# Patient Record
Sex: MALE | URBAN - METROPOLITAN AREA
[De-identification: names, ages, dates, MRNs, and addresses within clinical notes are randomized per-mention and may not be internally consistent; named-entity substitution may affect disease eponyms.]

---

## 2018-07-10 PROBLEM — T87.89 NON-HEALING WOUND OF AMPUTATION STUMP (HCC): Status: ACTIVE | Noted: 2018-07-10

## 2019-02-04 PROBLEM — N18.6 ESRD (END STAGE RENAL DISEASE) (HCC): Status: ACTIVE | Noted: 2019-02-04

## 2019-02-05 PROBLEM — Z48.812 ENCOUNTER FOR SURGICAL AFTERCARE FOLLOWING SURGERY OF CIRCULATORY SYSTEM: Status: ACTIVE | Noted: 2019-02-05

## 2019-10-02 PROBLEM — K22.10 ULCER OF ESOPHAGUS: Status: ACTIVE | Noted: 2019-10-02

## 2019-10-02 PROBLEM — R11.2 NAUSEA AND VOMITING: Status: ACTIVE | Noted: 2019-10-02

## 2019-10-02 PROBLEM — Z95.828 PRESENCE OF OTHER VASCULAR IMPLANTS AND GRAFTS: Status: ACTIVE | Noted: 2019-10-02

## 2019-10-02 PROBLEM — T85.858A STENOSIS DUE TO ANY DEVICE, IMPLANT OR GRAFT: Status: ACTIVE | Noted: 2019-10-02

## 2019-10-02 PROBLEM — I70.221 ATHEROSCLEROSIS OF RIGHT LOWER EXTREMITY WITH REST PAIN (HCC): Status: ACTIVE | Noted: 2018-07-10

## 2019-10-02 PROBLEM — I65.23 OCCLUSION AND STENOSIS OF BILATERAL CAROTID ARTERIES: Status: ACTIVE | Noted: 2019-10-02

## 2019-10-02 PROBLEM — I77.811 ABDOMINAL AORTIC ECTASIA (HCC): Status: ACTIVE | Noted: 2019-10-02

## 2019-10-02 PROBLEM — S88.119A AMPUTATED BELOW KNEE (HCC): Status: ACTIVE | Noted: 2019-10-02

## 2020-03-17 PROBLEM — Z99.2 ESRD ON PERITONEAL DIALYSIS (HCC): Status: ACTIVE | Noted: 2020-03-17

## 2020-03-17 PROBLEM — G54.6 PHANTOM PAIN FOLLOWING AMPUTATION OF LOWER LIMB (HCC): Status: ACTIVE | Noted: 2020-03-17

## 2020-03-17 PROBLEM — A04.72 C. DIFFICILE DIARRHEA: Status: ACTIVE | Noted: 2020-03-17

## 2020-03-17 PROBLEM — N18.6 ESRD ON PERITONEAL DIALYSIS (HCC): Status: ACTIVE | Noted: 2020-03-17

## 2020-04-07 PROBLEM — I70.222 ATHEROSCLEROSIS OF NATIVE ARTERIES OF EXTREMITIES WITH REST PAIN, LEFT LEG (HCC): Status: ACTIVE | Noted: 2018-07-10

## 2020-09-09 ENCOUNTER — APPOINTMENT (RX ONLY)
Dept: URBAN - METROPOLITAN AREA CLINIC 31 | Facility: CLINIC | Age: 65
Setting detail: DERMATOLOGY
End: 2020-09-09

## 2020-09-09 DIAGNOSIS — L82.1 OTHER SEBORRHEIC KERATOSIS: ICD-10-CM

## 2020-09-09 DIAGNOSIS — L72.8 OTHER FOLLICULAR CYSTS OF THE SKIN AND SUBCUTANEOUS TISSUE: ICD-10-CM

## 2020-09-09 PROCEDURE — ? ADDITIONAL NOTES

## 2020-09-09 PROCEDURE — 99201: CPT

## 2020-09-09 PROCEDURE — ? COUNSELING

## 2020-09-09 ASSESSMENT — LOCATION SIMPLE DESCRIPTION DERM
LOCATION SIMPLE: POSTERIOR NECK
LOCATION SIMPLE: LEFT UPPER BACK

## 2020-09-09 ASSESSMENT — LOCATION ZONE DERM
LOCATION ZONE: TRUNK
LOCATION ZONE: NECK

## 2020-09-09 ASSESSMENT — LOCATION DETAILED DESCRIPTION DERM
LOCATION DETAILED: LEFT INFERIOR MEDIAL UPPER BACK
LOCATION DETAILED: MID POSTERIOR NECK

## 2020-09-09 NOTE — PROCEDURE: ADDITIONAL NOTES
Additional Notes: If starts growing and becomes bothersome, call to schedule excision.
Detail Level: Detailed

## 2020-10-14 PROBLEM — Z99.2 DEPENDENCE ON RENAL DIALYSIS (HCC): Status: ACTIVE | Noted: 2020-10-13

## 2020-10-14 PROBLEM — I21.9 HEART ATTACK (HCC): Status: ACTIVE | Noted: 2020-10-13

## 2020-10-14 PROBLEM — D63.1 ANEMIA IN CHRONIC KIDNEY DISEASE (CKD): Status: ACTIVE | Noted: 2020-10-13

## 2020-10-14 PROBLEM — N18.9 ANEMIA IN CHRONIC KIDNEY DISEASE (CKD): Status: ACTIVE | Noted: 2020-10-13

## 2020-10-14 PROBLEM — I10 ESSENTIAL HYPERTENSION: Status: ACTIVE | Noted: 2020-10-13

## 2020-10-14 PROBLEM — E78.2 MIXED HYPERLIPIDEMIA: Status: ACTIVE | Noted: 2020-10-13

## 2020-11-12 PROBLEM — K21.9 GERD (GASTROESOPHAGEAL REFLUX DISEASE): Status: RESOLVED | Noted: 2020-11-12 | Resolved: 2020-11-12

## 2020-11-12 PROBLEM — K21.9 GERD (GASTROESOPHAGEAL REFLUX DISEASE): Status: ACTIVE | Noted: 2020-11-12

## 2020-12-11 PROBLEM — Z99.2 CKD (CHRONIC KIDNEY DISEASE) STAGE V REQUIRING CHRONIC DIALYSIS (HCC): Status: ACTIVE | Noted: 2019-02-04

## 2021-03-01 PROBLEM — I25.10 CAD (CORONARY ARTERY DISEASE): Status: ACTIVE | Noted: 2021-02-23

## 2021-05-04 PROBLEM — E03.9 HYPOTHYROID: Status: ACTIVE | Noted: 2021-04-29

## 2021-05-04 PROBLEM — I25.10 ATHEROSCLEROSIS OF NATIVE CORONARY ARTERY WITHOUT ANGINA PECTORIS: Status: ACTIVE | Noted: 2020-10-13

## 2021-06-03 PROBLEM — I34.0 NON-RHEUMATIC MITRAL REGURGITATION: Status: ACTIVE | Noted: 2021-05-24

## 2021-06-04 ENCOUNTER — APPOINTMENT (OUTPATIENT)
Dept: RADIOLOGY | Facility: MEDICAL CENTER | Age: 66
DRG: 291 | End: 2021-06-04
Attending: STUDENT IN AN ORGANIZED HEALTH CARE EDUCATION/TRAINING PROGRAM
Payer: MEDICARE

## 2021-06-04 ENCOUNTER — HOSPITAL ENCOUNTER (INPATIENT)
Facility: MEDICAL CENTER | Age: 66
LOS: 13 days | DRG: 291 | End: 2021-06-17
Attending: HOSPITALIST | Admitting: STUDENT IN AN ORGANIZED HEALTH CARE EDUCATION/TRAINING PROGRAM
Payer: MEDICARE

## 2021-06-04 DIAGNOSIS — I73.9 PVD (PERIPHERAL VASCULAR DISEASE) (HCC): ICD-10-CM

## 2021-06-04 DIAGNOSIS — I50.22 SYSTOLIC CHF, CHRONIC (HCC): ICD-10-CM

## 2021-06-04 DIAGNOSIS — R62.7 FAILURE TO THRIVE IN ADULT: ICD-10-CM

## 2021-06-04 DIAGNOSIS — Z71.89 ACP (ADVANCE CARE PLANNING): ICD-10-CM

## 2021-06-04 DIAGNOSIS — R54 FRAILTY SYNDROME IN GERIATRIC PATIENT: ICD-10-CM

## 2021-06-04 DIAGNOSIS — E03.9 HYPOTHYROIDISM, UNSPECIFIED TYPE: ICD-10-CM

## 2021-06-04 DIAGNOSIS — E43 SEVERE PROTEIN-CALORIE MALNUTRITION (HCC): ICD-10-CM

## 2021-06-04 DIAGNOSIS — Z99.2 ESRD ON DIALYSIS (HCC): ICD-10-CM

## 2021-06-04 DIAGNOSIS — N18.6 ESRD ON DIALYSIS (HCC): ICD-10-CM

## 2021-06-04 DIAGNOSIS — F41.9 ANXIETY: ICD-10-CM

## 2021-06-04 PROBLEM — K55.1 SUPERIOR MESENTERIC ARTERY STENOSIS (HCC): Status: ACTIVE | Noted: 2021-06-04

## 2021-06-04 LAB
CORTIS SERPL-MCNC: 15.6 UG/DL (ref 0–23)
CRP SERPL HS-MCNC: 1 MG/DL (ref 0–0.75)
ERYTHROCYTE [SEDIMENTATION RATE] IN BLOOD BY WESTERGREN METHOD: 6 MM/HOUR (ref 0–20)
LACTATE BLD-SCNC: 1.5 MMOL/L (ref 0.5–2)
LDH SERPL L TO P-CCNC: 246 U/L (ref 107–266)
PROCALCITONIN SERPL-MCNC: 0.27 NG/ML

## 2021-06-04 PROCEDURE — 99223 1ST HOSP IP/OBS HIGH 75: CPT | Mod: 25,AI | Performed by: STUDENT IN AN ORGANIZED HEALTH CARE EDUCATION/TRAINING PROGRAM

## 2021-06-04 PROCEDURE — 74177 CT ABD & PELVIS W/CONTRAST: CPT

## 2021-06-04 PROCEDURE — 84145 PROCALCITONIN (PCT): CPT

## 2021-06-04 PROCEDURE — 86140 C-REACTIVE PROTEIN: CPT

## 2021-06-04 PROCEDURE — 700102 HCHG RX REV CODE 250 W/ 637 OVERRIDE(OP): Performed by: STUDENT IN AN ORGANIZED HEALTH CARE EDUCATION/TRAINING PROGRAM

## 2021-06-04 PROCEDURE — 700117 HCHG RX CONTRAST REV CODE 255: Performed by: STUDENT IN AN ORGANIZED HEALTH CARE EDUCATION/TRAINING PROGRAM

## 2021-06-04 PROCEDURE — A9270 NON-COVERED ITEM OR SERVICE: HCPCS | Performed by: INTERNAL MEDICINE

## 2021-06-04 PROCEDURE — 85652 RBC SED RATE AUTOMATED: CPT

## 2021-06-04 PROCEDURE — 83605 ASSAY OF LACTIC ACID: CPT | Mod: 91

## 2021-06-04 PROCEDURE — 770006 HCHG ROOM/CARE - MED/SURG/GYN SEMI*

## 2021-06-04 PROCEDURE — A9270 NON-COVERED ITEM OR SERVICE: HCPCS | Performed by: STUDENT IN AN ORGANIZED HEALTH CARE EDUCATION/TRAINING PROGRAM

## 2021-06-04 PROCEDURE — 36415 COLL VENOUS BLD VENIPUNCTURE: CPT

## 2021-06-04 PROCEDURE — 82533 TOTAL CORTISOL: CPT

## 2021-06-04 PROCEDURE — 83615 LACTATE (LD) (LDH) ENZYME: CPT

## 2021-06-04 PROCEDURE — 99497 ADVNCD CARE PLAN 30 MIN: CPT | Performed by: STUDENT IN AN ORGANIZED HEALTH CARE EDUCATION/TRAINING PROGRAM

## 2021-06-04 PROCEDURE — 700111 HCHG RX REV CODE 636 W/ 250 OVERRIDE (IP): Performed by: STUDENT IN AN ORGANIZED HEALTH CARE EDUCATION/TRAINING PROGRAM

## 2021-06-04 PROCEDURE — 700102 HCHG RX REV CODE 250 W/ 637 OVERRIDE(OP): Performed by: INTERNAL MEDICINE

## 2021-06-04 RX ORDER — CHOLECALCIFEROL (VITAMIN D3) 125 MCG
500 CAPSULE ORAL DAILY
Status: DISCONTINUED | OUTPATIENT
Start: 2021-06-05 | End: 2021-06-17 | Stop reason: HOSPADM

## 2021-06-04 RX ORDER — ATORVASTATIN CALCIUM 40 MG/1
40 TABLET, FILM COATED ORAL NIGHTLY
Status: DISCONTINUED | OUTPATIENT
Start: 2021-06-04 | End: 2021-06-12

## 2021-06-04 RX ORDER — CARVEDILOL 6.25 MG/1
6.25 TABLET ORAL 2 TIMES DAILY WITH MEALS
Status: DISCONTINUED | OUTPATIENT
Start: 2021-06-04 | End: 2021-06-17 | Stop reason: HOSPADM

## 2021-06-04 RX ORDER — RANOLAZINE 500 MG/1
500 TABLET, EXTENDED RELEASE ORAL 2 TIMES DAILY
Status: DISCONTINUED | OUTPATIENT
Start: 2021-06-04 | End: 2021-06-17 | Stop reason: HOSPADM

## 2021-06-04 RX ORDER — PRASUGREL 10 MG/1
10 TABLET, FILM COATED ORAL DAILY
Status: DISCONTINUED | OUTPATIENT
Start: 2021-06-05 | End: 2021-06-17 | Stop reason: HOSPADM

## 2021-06-04 RX ORDER — GUAIFENESIN/DEXTROMETHORPHAN 100-10MG/5
10 SYRUP ORAL EVERY 6 HOURS PRN
Status: DISCONTINUED | OUTPATIENT
Start: 2021-06-04 | End: 2021-06-17 | Stop reason: HOSPADM

## 2021-06-04 RX ORDER — HALOPERIDOL 5 MG/ML
1 INJECTION INTRAMUSCULAR EVERY 4 HOURS PRN
Status: DISCONTINUED | OUTPATIENT
Start: 2021-06-04 | End: 2021-06-11

## 2021-06-04 RX ORDER — ISOSORBIDE DINITRATE 30 MG/1
30 TABLET ORAL 2 TIMES DAILY
Status: DISCONTINUED | OUTPATIENT
Start: 2021-06-04 | End: 2021-06-17 | Stop reason: HOSPADM

## 2021-06-04 RX ORDER — ACETAMINOPHEN 325 MG/1
650 TABLET ORAL EVERY 6 HOURS PRN
Status: DISCONTINUED | OUTPATIENT
Start: 2021-06-04 | End: 2021-06-12

## 2021-06-04 RX ORDER — POLYETHYLENE GLYCOL 3350 17 G/17G
1 POWDER, FOR SOLUTION ORAL
Status: DISCONTINUED | OUTPATIENT
Start: 2021-06-04 | End: 2021-06-04

## 2021-06-04 RX ORDER — HEPARIN SODIUM 5000 [USP'U]/ML
5000 INJECTION, SOLUTION INTRAVENOUS; SUBCUTANEOUS EVERY 8 HOURS
Status: DISCONTINUED | OUTPATIENT
Start: 2021-06-04 | End: 2021-06-04

## 2021-06-04 RX ORDER — VITAMIN B COMPLEX
2000 TABLET ORAL DAILY
Status: DISCONTINUED | OUTPATIENT
Start: 2021-06-05 | End: 2021-06-17 | Stop reason: HOSPADM

## 2021-06-04 RX ORDER — AMOXICILLIN 250 MG
2 CAPSULE ORAL 2 TIMES DAILY
Status: DISCONTINUED | OUTPATIENT
Start: 2021-06-04 | End: 2021-06-04

## 2021-06-04 RX ORDER — WARFARIN SODIUM 2.5 MG/1
2.5 TABLET ORAL DAILY
Status: DISCONTINUED | OUTPATIENT
Start: 2021-06-04 | End: 2021-06-07

## 2021-06-04 RX ORDER — LEVOTHYROXINE SODIUM 0.07 MG/1
150 TABLET ORAL EVERY MORNING
Status: DISCONTINUED | OUTPATIENT
Start: 2021-06-05 | End: 2021-06-17 | Stop reason: HOSPADM

## 2021-06-04 RX ORDER — LABETALOL HYDROCHLORIDE 5 MG/ML
10 INJECTION, SOLUTION INTRAVENOUS EVERY 4 HOURS PRN
Status: DISCONTINUED | OUTPATIENT
Start: 2021-06-04 | End: 2021-06-17 | Stop reason: HOSPADM

## 2021-06-04 RX ORDER — PROCHLORPERAZINE EDISYLATE 5 MG/ML
10 INJECTION INTRAMUSCULAR; INTRAVENOUS EVERY 6 HOURS PRN
Status: DISCONTINUED | OUTPATIENT
Start: 2021-06-04 | End: 2021-06-06

## 2021-06-04 RX ORDER — ASCORBIC ACID 500 MG
500 TABLET ORAL DAILY
Status: DISCONTINUED | OUTPATIENT
Start: 2021-06-05 | End: 2021-06-17 | Stop reason: HOSPADM

## 2021-06-04 RX ORDER — ONDANSETRON 2 MG/ML
4 INJECTION INTRAMUSCULAR; INTRAVENOUS EVERY 4 HOURS PRN
Status: DISCONTINUED | OUTPATIENT
Start: 2021-06-04 | End: 2021-06-11

## 2021-06-04 RX ORDER — ALPRAZOLAM 0.25 MG/1
0.25 TABLET ORAL NIGHTLY PRN
Status: DISCONTINUED | OUTPATIENT
Start: 2021-06-04 | End: 2021-06-17 | Stop reason: HOSPADM

## 2021-06-04 RX ORDER — ALPRAZOLAM 0.5 MG/1
0.5 TABLET ORAL
Status: DISCONTINUED | OUTPATIENT
Start: 2021-06-04 | End: 2021-06-04

## 2021-06-04 RX ORDER — CALCIUM ACETATE 667 MG/1
1334 TABLET ORAL
Status: DISCONTINUED | OUTPATIENT
Start: 2021-06-04 | End: 2021-06-08

## 2021-06-04 RX ORDER — PROMETHAZINE HYDROCHLORIDE 25 MG/1
12.5 SUPPOSITORY RECTAL EVERY 6 HOURS PRN
Status: DISCONTINUED | OUTPATIENT
Start: 2021-06-04 | End: 2021-06-06

## 2021-06-04 RX ORDER — ENALAPRILAT 1.25 MG/ML
1.25 INJECTION INTRAVENOUS EVERY 6 HOURS PRN
Status: DISCONTINUED | OUTPATIENT
Start: 2021-06-04 | End: 2021-06-17 | Stop reason: HOSPADM

## 2021-06-04 RX ORDER — OXYCODONE HYDROCHLORIDE 5 MG/1
5 TABLET ORAL NIGHTLY PRN
Status: DISCONTINUED | OUTPATIENT
Start: 2021-06-04 | End: 2021-06-12

## 2021-06-04 RX ORDER — ONDANSETRON 4 MG/1
4 TABLET, ORALLY DISINTEGRATING ORAL EVERY 4 HOURS PRN
Status: DISCONTINUED | OUTPATIENT
Start: 2021-06-04 | End: 2021-06-11

## 2021-06-04 RX ORDER — PROMETHAZINE HYDROCHLORIDE 25 MG/1
25 TABLET ORAL EVERY 6 HOURS PRN
Status: DISCONTINUED | OUTPATIENT
Start: 2021-06-04 | End: 2021-06-06

## 2021-06-04 RX ORDER — ALBUTEROL SULFATE 90 UG/1
1-2 AEROSOL, METERED RESPIRATORY (INHALATION) EVERY 6 HOURS PRN
Status: DISCONTINUED | OUTPATIENT
Start: 2021-06-04 | End: 2021-06-17 | Stop reason: HOSPADM

## 2021-06-04 RX ORDER — SODIUM CHLORIDE, SODIUM LACTATE, POTASSIUM CHLORIDE, AND CALCIUM CHLORIDE .6; .31; .03; .02 G/100ML; G/100ML; G/100ML; G/100ML
500 INJECTION, SOLUTION INTRAVENOUS
Status: DISCONTINUED | OUTPATIENT
Start: 2021-06-04 | End: 2021-06-17 | Stop reason: HOSPADM

## 2021-06-04 RX ORDER — BISACODYL 10 MG
10 SUPPOSITORY, RECTAL RECTAL
Status: DISCONTINUED | OUTPATIENT
Start: 2021-06-04 | End: 2021-06-04

## 2021-06-04 RX ORDER — LORATADINE 10 MG/1
10 TABLET ORAL DAILY
Status: DISCONTINUED | OUTPATIENT
Start: 2021-06-05 | End: 2021-06-11

## 2021-06-04 RX ORDER — FLUTICASONE PROPIONATE 50 MCG
2 SPRAY, SUSPENSION (ML) NASAL DAILY
Status: DISCONTINUED | OUTPATIENT
Start: 2021-06-05 | End: 2021-06-17 | Stop reason: HOSPADM

## 2021-06-04 RX ADMIN — IOHEXOL 80 ML: 350 INJECTION, SOLUTION INTRAVENOUS at 21:07

## 2021-06-04 RX ADMIN — ATORVASTATIN CALCIUM 40 MG: 40 TABLET, FILM COATED ORAL at 21:30

## 2021-06-04 RX ADMIN — CARVEDILOL 6.25 MG: 6.25 TABLET, FILM COATED ORAL at 21:31

## 2021-06-04 RX ADMIN — RANOLAZINE 500 MG: 500 TABLET, FILM COATED, EXTENDED RELEASE ORAL at 21:30

## 2021-06-04 RX ADMIN — ALPRAZOLAM 0.25 MG: 0.25 TABLET ORAL at 23:21

## 2021-06-04 RX ADMIN — OXYCODONE 5 MG: 5 TABLET ORAL at 23:21

## 2021-06-04 RX ADMIN — ONDANSETRON 4 MG: 2 INJECTION INTRAMUSCULAR; INTRAVENOUS at 21:46

## 2021-06-04 RX ADMIN — WARFARIN SODIUM 2.5 MG: 2.5 TABLET ORAL at 23:21

## 2021-06-04 RX ADMIN — ISOSORBIDE DINITRATE 30 MG: 30 TABLET ORAL at 21:30

## 2021-06-04 ASSESSMENT — COGNITIVE AND FUNCTIONAL STATUS - GENERAL
MOVING FROM LYING ON BACK TO SITTING ON SIDE OF FLAT BED: A LITTLE
EATING MEALS: A LITTLE
DAILY ACTIVITIY SCORE: 18
SUGGESTED CMS G CODE MODIFIER MOBILITY: CK
STANDING UP FROM CHAIR USING ARMS: A LITTLE
DRESSING REGULAR LOWER BODY CLOTHING: A LITTLE
PERSONAL GROOMING: A LITTLE
MOVING TO AND FROM BED TO CHAIR: A LITTLE
MOBILITY SCORE: 18
SUGGESTED CMS G CODE MODIFIER DAILY ACTIVITY: CK
TOILETING: A LITTLE
HELP NEEDED FOR BATHING: A LITTLE
WALKING IN HOSPITAL ROOM: A LITTLE
DRESSING REGULAR UPPER BODY CLOTHING: A LITTLE
CLIMB 3 TO 5 STEPS WITH RAILING: A LITTLE
TURNING FROM BACK TO SIDE WHILE IN FLAT BAD: A LITTLE

## 2021-06-04 ASSESSMENT — ENCOUNTER SYMPTOMS
COUGH: 0
DOUBLE VISION: 0
HEADACHES: 0
PALPITATIONS: 0
FLANK PAIN: 0
FALLS: 0
VOMITING: 1
HEARTBURN: 0
MYALGIAS: 0
NAUSEA: 1
SPEECH CHANGE: 0
FOCAL WEAKNESS: 0
DIZZINESS: 0
DEPRESSION: 0
BLURRED VISION: 0
BLOOD IN STOOL: 0
FEVER: 0
DIARRHEA: 0
PHOTOPHOBIA: 0
CHILLS: 0
CONSTIPATION: 0
BRUISES/BLEEDS EASILY: 0
WHEEZING: 0

## 2021-06-04 ASSESSMENT — FIBROSIS 4 INDEX: FIB4 SCORE: 2.17

## 2021-06-04 ASSESSMENT — PAIN DESCRIPTION - PAIN TYPE
TYPE: ACUTE PAIN
TYPE: ACUTE PAIN

## 2021-06-04 NOTE — PROGRESS NOTES
[facility] TRIAGE OFFICER ER REPORT  Consult/Admission requested by: Dr Orellana  Chief complaint: Nausea, vomiting, 20 lb weight loss in a month.  Pertinent history/ER Course: Please refer to the record in [facility] from [place]     Patient meets admission criterion: Yes..  Recommendations given or work up & consultations requested per triage officer:   Records from [facility] including last CTA abd/pelvis revealing SMA stenosis.   Consultants involved and pertinent input from consultants: see Epic and see records to be sent from last stay at [facility].    [facility] is on divert. [facility] is on divert.     Admission status: Inpatient.   Admission order placed: Yes.   Floor requested: medical   Assigned hospitalist: call [phone]

## 2021-06-05 ENCOUNTER — APPOINTMENT (OUTPATIENT)
Dept: CARDIOLOGY | Facility: MEDICAL CENTER | Age: 66
DRG: 291 | End: 2021-06-05
Attending: STUDENT IN AN ORGANIZED HEALTH CARE EDUCATION/TRAINING PROGRAM
Payer: MEDICARE

## 2021-06-05 ENCOUNTER — APPOINTMENT (OUTPATIENT)
Dept: RADIOLOGY | Facility: MEDICAL CENTER | Age: 66
DRG: 291 | End: 2021-06-05
Attending: STUDENT IN AN ORGANIZED HEALTH CARE EDUCATION/TRAINING PROGRAM
Payer: MEDICARE

## 2021-06-05 ENCOUNTER — HOSPITAL ENCOUNTER (OUTPATIENT)
Dept: RADIOLOGY | Facility: MEDICAL CENTER | Age: 66
End: 2021-06-05
Payer: MEDICARE

## 2021-06-05 LAB
ALBUMIN SERPL BCP-MCNC: 2.8 G/DL (ref 3.2–4.9)
ANISOCYTOSIS BLD QL SMEAR: ABNORMAL
BASOPHILS # BLD AUTO: 2.6 % (ref 0–1.8)
BASOPHILS # BLD: 0.2 K/UL (ref 0–0.12)
BUN SERPL-MCNC: 72 MG/DL (ref 8–22)
CALCIUM SERPL-MCNC: 9.2 MG/DL (ref 8.5–10.5)
CHLORIDE SERPL-SCNC: 89 MMOL/L (ref 96–112)
CO2 SERPL-SCNC: 23 MMOL/L (ref 20–33)
CREAT SERPL-MCNC: 15.17 MG/DL (ref 0.5–1.4)
EOSINOPHIL # BLD AUTO: 0.13 K/UL (ref 0–0.51)
EOSINOPHIL NFR BLD: 1.7 % (ref 0–6.9)
ERYTHROCYTE [DISTWIDTH] IN BLOOD BY AUTOMATED COUNT: 66.5 FL (ref 35.9–50)
GLUCOSE SERPL-MCNC: 65 MG/DL (ref 65–99)
HAV IGM SERPL QL IA: NORMAL
HBV CORE IGM SER QL: NORMAL
HBV SURFACE AB SERPL IA-ACNC: <3.5 MIU/ML (ref 0–10)
HBV SURFACE AG SER QL: NORMAL
HCT VFR BLD AUTO: 37.5 % (ref 42–52)
HCV AB SER QL: NORMAL
HGB BLD-MCNC: 12.2 G/DL (ref 14–18)
INR PPP: 2.31 (ref 0.87–1.13)
LYMPHOCYTES # BLD AUTO: 1.32 K/UL (ref 1–4.8)
LYMPHOCYTES NFR BLD: 17.4 % (ref 22–41)
MACROCYTES BLD QL SMEAR: ABNORMAL
MAGNESIUM SERPL-MCNC: 2.8 MG/DL (ref 1.5–2.5)
MANUAL DIFF BLD: NORMAL
MCH RBC QN AUTO: 33.5 PG (ref 27–33)
MCHC RBC AUTO-ENTMCNC: 32.5 G/DL (ref 33.7–35.3)
MCV RBC AUTO: 103 FL (ref 81.4–97.8)
MICROCYTES BLD QL SMEAR: ABNORMAL
MONOCYTES # BLD AUTO: 0.33 K/UL (ref 0–0.85)
MONOCYTES NFR BLD AUTO: 4.4 % (ref 0–13.4)
MORPHOLOGY BLD-IMP: NORMAL
NEUTROPHILS # BLD AUTO: 5.62 K/UL (ref 1.82–7.42)
NEUTROPHILS NFR BLD: 73.9 % (ref 44–72)
NRBC # BLD AUTO: 0 K/UL
NRBC BLD-RTO: 0 /100 WBC
PHOSPHATE SERPL-MCNC: 7.7 MG/DL (ref 2.5–4.5)
PLATELET # BLD AUTO: 155 K/UL (ref 164–446)
PLATELET BLD QL SMEAR: NORMAL
PMV BLD AUTO: 9.8 FL (ref 9–12.9)
POTASSIUM SERPL-SCNC: 3.7 MMOL/L (ref 3.6–5.5)
PROTHROMBIN TIME: 26.1 SEC (ref 12–14.6)
RBC # BLD AUTO: 3.64 M/UL (ref 4.7–6.1)
RBC BLD AUTO: PRESENT
SODIUM SERPL-SCNC: 131 MMOL/L (ref 135–145)
WBC # BLD AUTO: 7.6 K/UL (ref 4.8–10.8)

## 2021-06-05 PROCEDURE — 770001 HCHG ROOM/CARE - MED/SURG/GYN PRIV*

## 2021-06-05 PROCEDURE — A9270 NON-COVERED ITEM OR SERVICE: HCPCS | Performed by: INTERNAL MEDICINE

## 2021-06-05 PROCEDURE — 85027 COMPLETE CBC AUTOMATED: CPT

## 2021-06-05 PROCEDURE — 86706 HEP B SURFACE ANTIBODY: CPT

## 2021-06-05 PROCEDURE — 85007 BL SMEAR W/DIFF WBC COUNT: CPT

## 2021-06-05 PROCEDURE — 90945 DIALYSIS ONE EVALUATION: CPT

## 2021-06-05 PROCEDURE — 700102 HCHG RX REV CODE 250 W/ 637 OVERRIDE(OP): Performed by: STUDENT IN AN ORGANIZED HEALTH CARE EDUCATION/TRAINING PROGRAM

## 2021-06-05 PROCEDURE — 85610 PROTHROMBIN TIME: CPT

## 2021-06-05 PROCEDURE — A9270 NON-COVERED ITEM OR SERVICE: HCPCS | Performed by: STUDENT IN AN ORGANIZED HEALTH CARE EDUCATION/TRAINING PROGRAM

## 2021-06-05 PROCEDURE — 99233 SBSQ HOSP IP/OBS HIGH 50: CPT | Performed by: STUDENT IN AN ORGANIZED HEALTH CARE EDUCATION/TRAINING PROGRAM

## 2021-06-05 PROCEDURE — 83735 ASSAY OF MAGNESIUM: CPT

## 2021-06-05 PROCEDURE — 700102 HCHG RX REV CODE 250 W/ 637 OVERRIDE(OP): Performed by: INTERNAL MEDICINE

## 2021-06-05 PROCEDURE — 700111 HCHG RX REV CODE 636 W/ 250 OVERRIDE (IP): Performed by: STUDENT IN AN ORGANIZED HEALTH CARE EDUCATION/TRAINING PROGRAM

## 2021-06-05 PROCEDURE — 71250 CT THORAX DX C-: CPT | Mod: ME

## 2021-06-05 PROCEDURE — 80074 ACUTE HEPATITIS PANEL: CPT

## 2021-06-05 PROCEDURE — 36415 COLL VENOUS BLD VENIPUNCTURE: CPT

## 2021-06-05 PROCEDURE — 80069 RENAL FUNCTION PANEL: CPT

## 2021-06-05 RX ORDER — POLYETHYLENE GLYCOL 3350 17 G/17G
1 POWDER, FOR SOLUTION ORAL DAILY
Status: DISCONTINUED | OUTPATIENT
Start: 2021-06-05 | End: 2021-06-17 | Stop reason: HOSPADM

## 2021-06-05 RX ORDER — OMEPRAZOLE 20 MG/1
20 CAPSULE, DELAYED RELEASE ORAL DAILY
Status: DISCONTINUED | OUTPATIENT
Start: 2021-06-05 | End: 2021-06-08

## 2021-06-05 RX ORDER — AMOXICILLIN 250 MG
1 CAPSULE ORAL 2 TIMES DAILY
Status: DISCONTINUED | OUTPATIENT
Start: 2021-06-05 | End: 2021-06-17 | Stop reason: HOSPADM

## 2021-06-05 RX ORDER — GENTAMICIN SULFATE 1 MG/G
OINTMENT TOPICAL
Status: DISPENSED | OUTPATIENT
Start: 2021-06-05 | End: 2021-06-12

## 2021-06-05 RX ADMIN — RANOLAZINE 500 MG: 500 TABLET, FILM COATED, EXTENDED RELEASE ORAL at 21:48

## 2021-06-05 RX ADMIN — CARVEDILOL 6.25 MG: 6.25 TABLET, FILM COATED ORAL at 21:46

## 2021-06-05 RX ADMIN — WARFARIN SODIUM 2.5 MG: 2.5 TABLET ORAL at 21:45

## 2021-06-05 RX ADMIN — Medication 1334 MG: at 12:00

## 2021-06-05 RX ADMIN — ISOSORBIDE DINITRATE 30 MG: 30 TABLET ORAL at 21:47

## 2021-06-05 RX ADMIN — LEVOTHYROXINE SODIUM 150 MCG: 0.15 TABLET ORAL at 05:36

## 2021-06-05 RX ADMIN — DOCUSATE SODIUM 50 MG AND SENNOSIDES 8.6 MG 1 TABLET: 8.6; 5 TABLET, FILM COATED ORAL at 18:53

## 2021-06-05 RX ADMIN — ALPRAZOLAM 0.25 MG: 0.25 TABLET ORAL at 21:44

## 2021-06-05 RX ADMIN — OXYCODONE HYDROCHLORIDE AND ACETAMINOPHEN 500 MG: 500 TABLET ORAL at 05:36

## 2021-06-05 RX ADMIN — RANOLAZINE 500 MG: 500 TABLET, FILM COATED, EXTENDED RELEASE ORAL at 10:12

## 2021-06-05 RX ADMIN — POLYETHYLENE GLYCOL 3350 1 PACKET: 17 POWDER, FOR SOLUTION ORAL at 18:53

## 2021-06-05 RX ADMIN — CARVEDILOL 6.25 MG: 6.25 TABLET, FILM COATED ORAL at 10:12

## 2021-06-05 RX ADMIN — ATORVASTATIN CALCIUM 40 MG: 40 TABLET, FILM COATED ORAL at 21:47

## 2021-06-05 RX ADMIN — OMEPRAZOLE 20 MG: 20 CAPSULE, DELAYED RELEASE ORAL at 19:59

## 2021-06-05 RX ADMIN — CYANOCOBALAMIN TAB 500 MCG 500 MCG: 500 TAB at 05:36

## 2021-06-05 RX ADMIN — ONDANSETRON 4 MG: 2 INJECTION INTRAMUSCULAR; INTRAVENOUS at 12:03

## 2021-06-05 RX ADMIN — PRASUGREL 10 MG: 10 TABLET, FILM COATED ORAL at 05:36

## 2021-06-05 RX ADMIN — Medication 1334 MG: at 10:12

## 2021-06-05 RX ADMIN — ISOSORBIDE DINITRATE 30 MG: 30 TABLET ORAL at 05:36

## 2021-06-05 RX ADMIN — LORATADINE 10 MG: 10 TABLET ORAL at 05:36

## 2021-06-05 RX ADMIN — OXYCODONE 5 MG: 5 TABLET ORAL at 21:44

## 2021-06-05 RX ADMIN — ONDANSETRON 4 MG: 2 INJECTION INTRAMUSCULAR; INTRAVENOUS at 19:02

## 2021-06-05 RX ADMIN — ASPIRIN 81 MG: 81 TABLET, COATED ORAL at 05:35

## 2021-06-05 RX ADMIN — Medication 2000 UNITS: at 05:36

## 2021-06-05 RX ADMIN — ONDANSETRON 4 MG: 2 INJECTION INTRAMUSCULAR; INTRAVENOUS at 16:50

## 2021-06-05 ASSESSMENT — ENCOUNTER SYMPTOMS
NECK PAIN: 0
DEPRESSION: 0
FEVER: 0
PALPITATIONS: 0
HEADACHES: 0
SHORTNESS OF BREATH: 0
SORE THROAT: 0
MYALGIAS: 0
FOCAL WEAKNESS: 0
VOMITING: 1
SPUTUM PRODUCTION: 0
WEIGHT LOSS: 1
HEARTBURN: 0
ORTHOPNEA: 0
NAUSEA: 1
DIZZINESS: 0
BLURRED VISION: 0
CONSTIPATION: 1
ABDOMINAL PAIN: 0
CHILLS: 0
COUGH: 0

## 2021-06-05 ASSESSMENT — CHA2DS2 SCORE
VASCULAR DISEASE: YES
DIABETES: NO
AGE 75 OR GREATER: NO
HYPERTENSION: NO
CHA2DS2 VASC SCORE: 2
CHF OR LEFT VENTRICULAR DYSFUNCTION: NO
AGE 65 TO 74: YES
SEX: MALE
PRIOR STROKE OR TIA OR THROMBOEMBOLISM: NO

## 2021-06-05 ASSESSMENT — FIBROSIS 4 INDEX: FIB4 SCORE: 2.5

## 2021-06-05 ASSESSMENT — PAIN DESCRIPTION - PAIN TYPE
TYPE: ACUTE PAIN

## 2021-06-05 NOTE — ASSESSMENT & PLAN NOTE
History of stents  Effient, Ranexa  Holding Coreg and Isordil due to low blood pressure  Holding ASA    6/15 cardiology following, Dr. Matthew Noel current management    6/16 plan for outpatient follow-up  Continue conservative management  Will need evaluation for mitral valve

## 2021-06-05 NOTE — ASSESSMENT & PLAN NOTE
Previously on PD  Right Internal Jugular Tunneled Hemodialysis Catheter Placement. 6/11/2021  HD per Nephrology    6/15 on HD, tolerating well  Nausea improved, cont HD, per nephrology    6/16 HD per nephrology  Needs outpatient dialysis, Monday Wednesday Friday arrangements   to assist

## 2021-06-05 NOTE — PROGRESS NOTES
2030 Pt stated he was having some pain in his leg. Pt states he usually takes oxycodone 5mg at home at night as well as xanax 0.25mg at night in order for him to sleep comfortably. Pt requests that he gets the same medications here in order for him to be able to get some rest.     2037 Spoke with Scott Ghosh M.D. regarding the medications pt states he takes at home. Received verbal order with read back for pt to receive oxycodone 5mg and change the xanax order from 0.5mg to 0.25 mg. (See MAR and Orders).     2315 Pt requests the xanax and oxycodone before bed. Given as needed and as ordered (See MAR). Pt then able to sleep comfortably.

## 2021-06-05 NOTE — PROGRESS NOTES
Hospital Medicine Daily Progress Note    Date of Service  6/5/2021    Chief Complaint  66 y.o. male presented 6/4/2021 with N/V, unable to tolerate po and constipation    Hospital Course  Per Dr. Kaye's HPI:  66 y.o. M with ESRD on CCPD, CAD s/p multiple stents, valvular heart disease, HTN, HLD presented 6/4/2021 to [facility] with complaint of nausea, vomiting poor oral intake and weight loss of 45 pounds over the past month; then subsequently transferred to [facility] as a divert on the same day.  It was noted that he had CTA abdominal pelvis revealed SMA stenosis. He reported having history of C. difficile in April 2020, has not had diarrhea over the past month and reported to have negative C. difficile study a week ago. Upon admission, patient appeared comfortable with no deranged vital signs.  Abdominal exam is benign, doubt he has SMA stenosis given benign exam and on warfarin with therapeutic INR.  Additionally, he does take Effient in addition to warfarin for his vascular disease.  However, CTAP with contrast was ordered on admisison to ensure no SMA thrombosis.  Patient was admitted to medicine service for further evaluation and treatment.    Interval Problem Update  6/5  Vitals reviewed; afebrile.  The rest of the vitals within normal parameters.  Pain scale reported - none this AM  Blood glucose in last 24hrs - 65-89    At bedside, quite pleasant and knowledgeable about his medical conditions. Reportedly has been having severe constipation, N/V and unable to keep anything down. Unexplained weight loss about 45lb as well though his appetite has been poor in this juncture as well. No recent CT chest. This AM, he was able to tolerate some breakfast.   Current plan with monitoring oral intake and aggressive bowel regimen discussed.     CDW Nephrology Dr. Cantrell - knows this patient well.     Labs reviewed  Na 131, Cr 15.17  WBC 12.2, Plt 155  Mg 2.8  INR 2.31  Lactic 1.5  Procal 0.27    CT  abd/pelvis with: the SMA and celiac axis are noted to be patent;  No acute abdominal or pelvic findings.    CXR: no acute cardiopulm disease    US ABd: No sonographic signs of acute cholecystitis; Echogenic renal parenchyma of both kidneys with atrophy of the left kidney.    Consultants/Specialty  Nephrology    Code Status  Full Code    Disposition  Likely home when medically clear    Review of Systems  Review of Systems   Constitutional: Positive for weight loss. Negative for chills, fever and malaise/fatigue.   HENT: Negative for sore throat.    Eyes: Negative for blurred vision.   Respiratory: Negative for cough, sputum production and shortness of breath.    Cardiovascular: Negative for chest pain, palpitations, orthopnea and leg swelling.   Gastrointestinal: Positive for constipation, nausea and vomiting. Negative for abdominal pain and heartburn.   Genitourinary: Negative for dysuria, frequency and urgency.   Musculoskeletal: Negative for myalgias and neck pain.        Nerve pain in right leg   Neurological: Negative for dizziness, focal weakness and headaches.   Psychiatric/Behavioral: Negative for depression.        Physical Exam  Temp:  [36.2 °C (97.1 °F)-36.4 °C (97.5 °F)] 36.2 °C (97.1 °F)  Pulse:  [] 90  Resp:  [15-18] 16  BP: (103-142)/(63-78) 103/63  SpO2:  [94 %-99 %] 99 %    Physical Exam  Vitals and nursing note reviewed.   Constitutional:       General: He is not in acute distress.     Appearance: Normal appearance. He is not ill-appearing.      Comments: Cachetic   HENT:      Head: Normocephalic and atraumatic.      Mouth/Throat:      Mouth: Mucous membranes are moist.      Pharynx: Oropharynx is clear.   Eyes:      General: No scleral icterus.     Conjunctiva/sclera: Conjunctivae normal.   Cardiovascular:      Rate and Rhythm: Normal rate and regular rhythm.      Pulses: Normal pulses.      Heart sounds: Murmur heard.     Pulmonary:      Effort: Pulmonary effort is normal. No respiratory  distress.      Breath sounds: Normal breath sounds. No wheezing.   Abdominal:      General: Bowel sounds are normal. There is no distension.      Palpations: Abdomen is soft.      Tenderness: There is no abdominal tenderness.      Comments: Peritoneal catheter noted (C/D/I)   Musculoskeletal:         General: No swelling or tenderness. Normal range of motion.      Comments: Left BKA stump - C/D/I   Skin:     General: Skin is warm and dry.      Capillary Refill: Capillary refill takes less than 2 seconds.   Neurological:      General: No focal deficit present.      Mental Status: He is alert and oriented to person, place, and time. Mental status is at baseline.   Psychiatric:         Mood and Affect: Mood normal.         Fluids    Intake/Output Summary (Last 24 hours) at 6/5/2021 1449  Last data filed at 6/5/2021 0456  Gross per 24 hour   Intake 480 ml   Output --   Net 480 ml       Laboratory  Recent Labs     06/04/21 0730 06/05/21  0744   WBC 9.7 7.6   RBC 4.35* 3.64*   HEMOGLOBIN 14.7 12.2*   HEMATOCRIT 44.6 37.5*   .5* 103.0*   MCH 33.8* 33.5*   MCHC 33.0 32.5*   RDW 17.2* 66.5*   PLATELETCT 178 155*   MPV 9.9 9.8     Recent Labs     06/04/21  0730 06/05/21  0744   SODIUM 136 131*   POTASSIUM 3.1* 3.7   CHLORIDE 91* 89*   CO2 30 23   GLUCOSE 89 65   BUN 71* 72*   CREATININE 13.4* 15.17*   CALCIUM 9.6 9.2     Recent Labs     06/04/21 0730 06/05/21  0744   APTT 26.5  --    INR 2.21 2.31*               Imaging  OUTSIDE IMAGES-US ABDOMEN   Final Result      OUTSIDE IMAGES-DX CHEST   Final Result      CT-ABDOMEN-PELVIS WITH   Final Result   Addendum 1 of 1   On further review, the SMA and celiac axis are noted to be patent.    Contrast is difficult to confirm within the diminutive and markedly    atherosclerotic renal arteries.      Final      1.  No acute abdominal or pelvic findings.   2.  Bilaterally small atrophic appearing kidneys consistent with renal failure, with a dialysis catheter noted in the  pelvic cavity.   3.  Colonic diverticulosis without diverticulitis.      EC-ECHOCARDIOGRAM COMPLETE W/O CONT    (Results Pending)   CT-CHEST (THORAX) W/O    (Results Pending)        Assessment/Plan  * Failure to thrive in adult  Assessment & Plan  Reported weight loss of 45 pounds over the past few months due to poor oral intake  CT abd/pelvis with no evidence of mass or tumor  Will get CT chest to rule out malignancy (prior hx of testicular cancer s/p XRT); if neg, advised possible outpatient colonoscopy   RD consult  Encourage PO intake as tolerated - did tolerated some breakfast this AM  Ensure        ACP (advance care planning)  Assessment & Plan  Plan of care and rationale for hospitalization discussed on admission  FULL code status confirmed      Anxiety  Assessment & Plan  PRN Xanax    PVD (peripheral vascular disease) ([facility])  Assessment & Plan  S/p left BKA  Former smoker  H/o vein graft  Cont Warfarin/Effient/statin    Severe protein-calorie malnutrition (HCC)  Assessment & Plan  PO intake as tolerated  Ensure  RD consult    Superior mesenteric artery stenosis ([facility])  Assessment & Plan  Transferred for concern for SMA thrombosis -- reported to have +finding on CT AP previously - however, no report seen at [facility] Arrival    CT AP at [facility]: no SMA thrombosis  Doubtful given therapeutic INR while on warfarin and benign abdominal exam    CAD (coronary artery disease)- (present on admission)  Assessment & Plan  Warfarin/Effient/BB/statin  Isodil  Ranexa for angina    Essential hypertension- (present on admission)  Assessment & Plan  Coreg    Anemia in chronic kidney disease (CKD)- (present on admission)  Assessment & Plan  Chronic, no gross bleeding    C. difficile colitis  Assessment & Plan  H/o  Reported having negative result a week prior  No WBC, Cr elevated due to ESRD  No toxic swetha colon seen on CT AP  No diarrhea so far    ESRD on peritoneal dialysis (HCC)- (present on admission)  Assessment &  Plan  Continue PD  Continue phosphate binders  Nephrology consulted for assistance with care    Intractable nausea and vomiting  Assessment & Plan  IVF, antiemetic  Supportive care  No acute etiology seen on CTAP  Could be back up from constipation    Status post below knee amputation of left lower extremity- (present on admission)  Assessment & Plan  S/p left BKA  PT/OT    Mixed hyperlipidemia- (present on admission)  Assessment & Plan  Statin    Hypothyroid- (present on admission)  Assessment & Plan  Synthroid       VTE prophylaxis: on coumadin (INR therapeutic)

## 2021-06-05 NOTE — PROGRESS NOTES
Inpatient Anticoagulation Service Note    Date: 6/4/2021    Reason for Anticoagulation: Atrial Fibrillation   Target INR: 2.0 to 3.0              INR from last 7 days     None        Dose from last 7 days     Date/Time Dose (mg)    06/04/21 2152  2.5        Average Dose (mg): 2.5  Significant Interactions: Statin  Reversal Agent Administered: Not Applicable  Comments: Transferred from [facility].  Admitted for N/V   Home dose 2.5mg QD. Not followed by coumadin clinic. No overt bleeding noted, H&H stable. INR 2.21.  Continue home dose.    Plan:  2.5mg QD  Education Material Provided?: No  Pharmacist suggested discharge dosing: TBD on INR, recommend continuation of current dose 2.5mg QD     Mirtha Atkins, PharmD

## 2021-06-05 NOTE — CARE PLAN
Problem: Fall Risk  Goal: Patient will remain free from falls  Outcome: Progressing     Problem: Pain - Standard  Goal: Alleviation of pain or a reduction in pain to the patient’s comfort goal  Outcome: Progressing   The patient is Stable - Low risk of patient condition declining or worsening

## 2021-06-05 NOTE — CONSULTS
"[facility] -  CONSULTATION NOTE               Author: Skinny Cantrell M.D. Date & Time: 6/5/2021  11:06 AM       REASON FOR CONSULTATION:   - Inpatient hemodialysis management.    CHIEF COMPLAINT:   -  \"I got N/V\"    HISTORY OF PRESENT ILLNESS:    67 yo M PMH ESRD CCPD, HTN, anemia of CKD, CKD-MBD, HLD, and CAD who presents to ED with CC as above.  He has chronic intermittent N/V that he's been battling for a long time.  It leads to wide fluctuations in his po intake.  He also has severe CAD and non-cardiac atherosclerosis.  Recently he feel she has lost 45 lbs in the past month due to the N/V.  He's had a CTA in past that has revealed SMA steosis, but report was not available here at this institution.  Repeat imaging ordered by admit MD and he was admitted for further evaluation.  Has no abd pain and otherwise denies any associated F/C/CP/SOB.  No melena, hematochezia, hematemesis.  No HA, visual changes.    REVIEW OF SYSTEMS:    10 point ROS was performed and is as per HPI or otherwise negative    PAST MEDICAL HISTORY:   - ESRD daily CCPD  - HTN  - Anemia of CKD  - CKD-MBD  - HLD  - CAD  - Hypothyroidism  - C. diff    PAST SURGICAL HISTORY:   - LHC with cardiac stent  - PDC placement  - PermCath placement and removal    FAMILY HISTORY:   - Reviewed and non contributory to current illness    SOCIAL HISTORY:   - No tobacco, former user  - No EtOH  - No illicits    HOME MEDICATIONS:   - Reviewed and documented in chart    LABORATORY STUDIES:   - Reviewed and documented in chart    ALLERGIES:  Amoxicillin-pot clavulanate, Augmentin, Codeine, and Promethazine    VS:  /63   Pulse 90   Temp 36.2 °C (97.1 °F) (Temporal)   Resp 16   Ht 1.778 m (5' 10\")   Wt 52.2 kg (115 lb)   SpO2 99%   BMI 16.50 kg/m²   Physical Exam  Nursing note reviewed.   Constitutional:       Appearance: Normal appearance.   Neck:      Trachea: No tracheal tenderness.   Cardiovascular:      Rate and Rhythm: " Normal rate and regular rhythm.      Comments: No edema  Pulmonary:      Effort: Pulmonary effort is normal.      Breath sounds: Normal breath sounds.   Abdominal:      General: There is no distension.      Palpations: Abdomen is soft.   Musculoskeletal:         General: No deformity.   Skin:     Findings: No rash.   Neurological:      Mental Status: He is alert and oriented to person, place, and time.      Motor: No atrophy.   Psychiatric:         Behavior: Behavior normal.     FLUID BALANCE:  In: 480 [P.O.:480]  Out: -     LABS:  Recent Labs     06/04/21  0730 06/05/21  0744   SODIUM 136 131*   POTASSIUM 3.1* 3.7   CHLORIDE 91* 89*   CO2 30 23   GLUCOSE 89 65   BUN 71* 72*   CREATININE 13.4* 15.17*   CALCIUM 9.6 9.2        IMAGING:  - All imaging reviewed from admission to present day    IMPRESSION:  # ESRD  - Etiology likely 2/2 HTN  - PD Rx:  * 9hrs tx time; 6 x 2.5L exchanges  * 2.5/1.5 % fluid; No last fill/no mid day exchange  # N/V  - Etiology unclear  - Chronic intermittent issue  - Followed by GI and has had extensive work up  - Atherosclerosis of GI vasculature a possibility  # HTN  - Goal BP < 140/90  - Stable  # Anemia of CKD  - Goal Hgb 10-11  - Stable  # CKD-MBD  - PO4 pen  - Ca pen  - PTH pen  - Vit D pen     PLAN:  - Daily CCPD  - Tweak Rx of PD as needed  - No dietary protein restrictions  - Dose all meds per ESRD  - Regular diet ok  - 2L fluid restriction    Thank you for the consultation!

## 2021-06-05 NOTE — PROGRESS NOTES
Patient discharged with all belongings. Discharge summary discussed and signed. All questions answered.

## 2021-06-05 NOTE — ASSESSMENT & PLAN NOTE
Plan of care and rationale for hospitalization discussed on admission  FULL code status confirmed

## 2021-06-05 NOTE — PROGRESS NOTES
Received bedside report from night shift nurse. Patient resting in bed, call light and personal belongings within reach, bed in the low and locked position with upper side rails up. Assessment complete, vital signs stable, all needs met at this time. Hourly rounding in place, plan of care discussed with patient, patient verbalized understanding.

## 2021-06-05 NOTE — CARE PLAN
The patient is Stable - Low risk of patient condition declining or worsening         Progress made toward(s) clinical / shift goals:        Problem: Knowledge Deficit - Standard  Goal: Patient and family/care givers will demonstrate understanding of plan of care, disease process/condition, diagnostic tests and medications  Outcome: Progressing  Note: Pt updated on plan of care. Pt encouraged to ask questions and questions were answered. Call light within reach. Pt reminded to use call light whenever needing assistance.       Problem: Pain - Standard  Goal: Alleviation of pain or a reduction in pain to the patient’s comfort goal  Outcome: Progressing  Note: Pt was experiencing some pain in his leg earlier- pt states he takes oxycodone at home at night for his leg pain to help him sleep. Spoke with Scott Ghosh M.D. and updated him with what pt said and received verbal order for oxycodone 5mg. (See MAR). Pt then able to sleep comfortably throughout the night.        Patient is not progressing towards the following goals:    N/a

## 2021-06-05 NOTE — DIETARY
NUTRITION SERVICES  Calorie count consult received. MD agreeable for RD to follow pt more informally. RD see pt for BMI <19 and severe protein-calorie diagnosis as feasible. Diet order is regular with Boost Glucose Control daily.     RD following.

## 2021-06-05 NOTE — PROGRESS NOTES
Inpatient Anticoagulation Service Note    Date: 6/5/2021    Reason for Anticoagulation: Atrial Fibrillation, Other (Peripheral Vascular Disease)   Target INR: 2.0 to 3.0  [id number] VASc Score: 2  HAS-BLED Score: 3   Hemoglobin Value: (!) 12.2  Hematocrit Value: (!) 37.5  Lab Platelet Value: (!) 155    INR from last 7 days     Date/Time INR Value    06/05/21 0744  (!) 2.31        Dose from last 7 days     Date/Time Dose (mg)    06/05/21 1212  2.5    06/04/21 2152  2.5        Average Dose (mg):  (Home dose: warfarin 2.5mg po daily)  Significant Interactions: Antiplatelet Medications, Statin, Thyroid Medications  Bridge Therapy: No   Reversal Agent Administered: Not Applicable    Comments: Patient continues on warfarin for AF and PVD. INR therapeutic today. H/H stable and no signs of active bleeding. DDI noted above. Regular diet ordered, patient consuming meals per chart review. Previous warfarin dose recieved. INR theraputic, will continue home dose of warfarin 2.5mg po daily with repeat INR tomorrow.    Plan:  Warfarin 2.5mg po daily  Education Material Provided?: No (Home medication)  Pharmacist suggested discharge dosing: tentative plan to continue home warfarin dose of 2.5mg po daily with repeat INR within 2-3 days of discharge.      tSefanie Romano, PharmD

## 2021-06-05 NOTE — H&P
Hospital Medicine History & Physical Note    Date of Service  6/4/2021    Primary Care Physician  Kali Liang M.D.    Consultants  Nephrology    Code Status  Full Code    Chief Complaint  No chief complaint on file.  Nausea vomiting  Poor oral intake, weight loss    History of Presenting Illness  66 y.o. male with history of multiple comorbidities who presented 6/4/2021 to [facility] with complaint of nausea vomiting poor oral intake and weight loss of 45 pounds over the past month, then subsequently transferred to [facility] as a divert on the same day.  It was noted that he has CTA abdominal pelvis revealing SMA stenosis, although I do not see this report at time of my evaluation.  He reported having history of C. difficile in April 2020, has not had diarrhea over the past month and reported to have negative C. difficile study a week ago.  At time of my evaluation, patient appears comfortable with no deranged vital signs.  Abdominal exam is benign, doubt he has SMA stenosis given benign exam and on warfarin with therapeutic INR.  Additionally he does take Effient in addition to warfarin for his vascular disease.  However, will order CTAP with contrast to ensure no SMA thrombosis.  Patient was admitted to medicine service by me for further evaluation and treatment.    Review of Systems  Review of Systems   Constitutional: Positive for malaise/fatigue. Negative for chills and fever.   HENT: Negative for hearing loss and tinnitus.    Eyes: Negative for blurred vision, double vision and photophobia.   Respiratory: Negative for cough and wheezing.    Cardiovascular: Negative for chest pain and palpitations.   Gastrointestinal: Positive for nausea and vomiting. Negative for blood in stool, constipation, diarrhea and heartburn.   Genitourinary: Negative for dysuria and flank pain.   Musculoskeletal: Negative for falls and myalgias.   Skin: Negative for itching and rash.   Neurological: Negative for  dizziness, speech change, focal weakness and headaches.   Endo/Heme/Allergies: Negative for environmental allergies. Does not bruise/bleed easily.   Psychiatric/Behavioral: Negative for depression and suicidal ideas.   All other systems reviewed and are negative.      Past Medical History   has a past medical history of Arterial thrombosis (HCC), Arterial vascular disease, Colonic polyp, DDD (degenerative disc disease), lumbosacral (2018), Degenerative joint disease (DJD) of hip (2018), Familial hyperlipidemia, Hypogonadism male, Hypothyroid, S/P below knee amputation (HCC), and Testicular cancer (HCC).    Surgical History   has a past surgical history that includes amputation, below the knee; orchiectomy bilateral; and tonsillectomy.     Family History  family history includes Cancer in an other family member; Heart Attack in an other family member; Heart Failure in his father and mother; Osteoporosis in his mother, sister, sister, and sister.     Social History   reports that he quit smoking about 27 years ago. His smoking use included cigarettes. He smoked 1.00 pack per day. He has never used smokeless tobacco. He reports previous alcohol use of about 1.5 oz of alcohol per week. He reports that he does not use drugs.    Allergies  Allergies   Allergen Reactions   • Amoxicillin-Pot Clavulanate Unspecified     body cramps  Body cramps   • Augmentin Unspecified     Body cramps   • Codeine Unspecified     HA   • Promethazine Unspecified     muscle spasms       Medications  Prior to Admission Medications   Prescriptions Last Dose Informant Patient Reported? Taking?   ALPRAZolam (XANAX) 0.5 MG Tab 6/3/2021 at 2000 Patient No No   Sig: take 1 tablet by mouth at bedtime if needed for sleep or anxiety   Calcium Acetate, Phos Binder, 667 MG Cap 5/13/2021 Patient Yes No   Sig: take 2 capsules by mouth four times a day WITH MEALS AND SNACKS   Dexlansoprazole 60 MG CAPSULE DELAYED RELEASE delayed-release capsule 5/20/2021  Patient Yes No   Sig: Take 60 mg by mouth 2 times a day.   Ferrous Sulfate 50 MG Tab CR  Patient Yes No   Sig: Take 1 tablet by mouth every day.   Patient not taking: Reported on 6/3/2021   VITAMIN E PO 6/3/2021 at Unknown time Patient Yes No   Sig: Take  by mouth. Dose unknown   albuterol 108 (90 Base) MCG/ACT Aero Soln inhalation aerosol 5/4/2021 Patient No No   Sig: inhale 1 to 2 puffs by mouth every 6 hours if needed for shortness of breath   ascorbic acid (ASCORBIC ACID) 500 MG Tab 6/3/2021 at 2000 Patient Yes No   Sig: Take 500 mg by mouth every day.   aspirin EC (ECOTRIN) 81 MG Tablet Delayed Response 6/3/2021 at 2000 Patient Yes No   Sig: Take 81 mg by mouth every day.   atorvastatin (LIPITOR) 40 MG Tab 6/3/2021 at 2000 Patient No No   Sig: Take 1 Tab by mouth every day.   carvedilol (COREG) 6.25 MG Tab 6/3/2021 at Unknown time Patient Yes No   Sig: TAKE 1 TABLET BY MOUTH TWICE A DAY   cyanocobalamin (VITAMIN B-12) 500 MCG Tab 6/4/2021 at Unknown time Patient Yes No   Sig: Take 500 mcg by mouth every day.   fluticasone (FLONASE) 50 MCG/ACT nasal spray 6/3/2021 at Unknown time Patient No No   Sig: Spray 2 Sprays in nose every day. Each Nostril   isosorbide dinitrate (ISORDIL) 30 MG Tab 6/4/2021 at Unknown time Patient No No   Sig: Take 1 tablet by mouth 2 times a day.   levothyroxine (SYNTHROID) 150 MCG Tab 6/4/2021 at 0600 Patient No No   Sig: TAKE 1 TABLET BY MOUTH EVERY MORNING ON AN EMPTY STOMACH.   loratadine (CLARITIN) 10 MG Tab 5/4/2021 Patient No No   Sig: Take 1 Tab by mouth every day.   nitroglycerin (NITROSTAT) 0.4 MG SL Tab 5/3/2021 Patient Yes No   Sig: Place 0.4 mg under the tongue.   ondansetron (ZOFRAN ODT) 4 MG TABLET DISPERSIBLE 6/4/2021 at Unknown time Patient No No   Sig: One tablet under the tongue every six hours   oxyCODONE immediate-release (ROXICODONE) 5 MG Tab 6/3/2021 at 2000 Patient Yes No   Sig: Take 5 mg by mouth.   prasugrel (EFFIENT) 10 MG Tab 6/3/2021 at Unknown time Patient  Yes No   Sig: Take 10 mg by mouth every day.   ranolazine (RANEXA) 500 MG TABLET SR 12 HR 6/4/2021 at 0800 Patient Yes No   Sig: Take 500 mg by mouth 2 times a day.   vitamin D (CHOLECALCIFEROL) 1000 UNIT Tab 6/3/2021 at Unknown time Patient Yes No   Sig: Take 2,000 Units by mouth every day.   warfarin (COUMADIN) 2.5 MG Tab 6/3/2021 at 2000 Patient No No   Sig: Take 1 tablet by mouth every day.      Facility-Administered Medications Last Administration Doses Remaining   testosterone cypionate (DEPO-TESTOSTERONE) injection 200 mg 4/1/2021  2:12 PM 6          Physical Exam  Temp:  [36.2 °C (97.1 °F)] 36.2 °C (97.1 °F)  Pulse:  [93] 93  Resp:  [18] 18  BP: (142)/(77) 142/77  SpO2:  [95 %] 95 %    Physical Exam  Vitals and nursing note reviewed.   Constitutional:       Comments: Frail  underweight   HENT:      Head: Normocephalic and atraumatic.      Nose: Nose normal.      Mouth/Throat:      Mouth: Mucous membranes are dry.      Pharynx: Oropharynx is clear.   Eyes:      General: No scleral icterus.     Extraocular Movements: Extraocular movements intact.   Cardiovascular:      Rate and Rhythm: Normal rate. Rhythm irregular.      Pulses: Normal pulses.      Heart sounds: No friction rub.   Pulmonary:      Effort: Pulmonary effort is normal. No respiratory distress.      Breath sounds: No wheezing.   Abdominal:      General: Bowel sounds are normal. There is no distension.      Palpations: Abdomen is soft.      Tenderness: There is no abdominal tenderness. There is no guarding or rebound.   Musculoskeletal:         General: No swelling. Normal range of motion.      Cervical back: Neck supple. No tenderness.      Comments: S/p left BKA   Skin:     General: Skin is warm and dry.      Capillary Refill: Capillary refill takes 2 to 3 seconds.   Neurological:      General: No focal deficit present.      Mental Status: He is alert and oriented to person, place, and time.   Psychiatric:         Mood and Affect: Mood normal.          Laboratory:  Recent Labs     06/04/21  0730   WBC 9.7   RBC 4.35*   HEMOGLOBIN 14.7   HEMATOCRIT 44.6   .5*   MCH 33.8*   MCHC 33.0   RDW 17.2*   PLATELETCT 178   MPV 9.9     Recent Labs     06/04/21  0730   SODIUM 136   POTASSIUM 3.1*   CHLORIDE 91*   CO2 30   GLUCOSE 89   BUN 71*   CREATININE 13.4*   CALCIUM 9.6     Recent Labs     06/04/21  0730   ALTSGPT 12   ASTSGOT 20   ALKPHOSPHAT 113   TBILIRUBIN 0.7   LIPASE 26*   GLUCOSE 89     Recent Labs     06/04/21  0730   APTT 26.5   INR 2.21     No results for input(s): NTPROBNP in the last 72 hours.      No results for input(s): TROPONINT in the last 72 hours.    Imaging:  CT-ABDOMEN-PELVIS WITH   Final Result   Addendum 1 of 1   On further review, the SMA and celiac axis are noted to be patent.    Contrast is difficult to confirm within the diminutive and markedly    atherosclerotic renal arteries.      Final      1.  No acute abdominal or pelvic findings.   2.  Bilaterally small atrophic appearing kidneys consistent with renal failure, with a dialysis catheter noted in the pelvic cavity.   3.  Colonic diverticulosis without diverticulitis.      EC-ECHOCARDIOGRAM COMPLETE W/O CONT    (Results Pending)         Assessment/Plan:  I anticipate this patient will require at least two midnights for appropriate medical management, necessitating inpatient admission.    * Failure to thrive in adult  Assessment & Plan  Reported weight loss of 45 pounds over the past few months due to poor oral intake  RD consult  Encourage PO intake as tolerated  Ensure    Severe protein-calorie malnutrition (HCC)  Assessment & Plan  PO intake as tolerated  Ensure  RD consult    PVD (peripheral vascular disease) ([facility])  Assessment & Plan  S/p left BKA  Former smoker  H/o vein graft  Cont Warfarin/Effient/statin    Superior mesenteric artery stenosis (HCC)  Assessment & Plan  Transferred for concern for SMA thrombosis -- reported to have +finding on CT AP previously - however, no  report seen at [facility] Arrival    CT AP at [facility]: no SMA thrombosis  Doubtful given therapeutic INR while on warfarin and benign abdominal exam    CAD (coronary artery disease)- (present on admission)  Assessment & Plan  Warfarin/Effient/BB/statin  Isodil  Ranexa for angina    C. difficile colitis  Assessment & Plan  H/o  Reported having negative result a week prior  No WBC, Cr elevated due to ESRD  No toxic swetha colon seen on CT AP  No diarrhea so far    ESRD on peritoneal dialysis (HCC)- (present on admission)  Assessment & Plan  Continue PD  Continue phosphate binders  Nephrology consulted for assistance with care    Intractable nausea and vomiting  Assessment & Plan  IVF, antiemetic  Supportive care  No acute etiology seen on CTAP    Essential hypertension- (present on admission)  Assessment & Plan  Coreg    Anemia in chronic kidney disease (CKD)- (present on admission)  Assessment & Plan  Chronic, no gross bleeding    Mixed hyperlipidemia- (present on admission)  Assessment & Plan  statin    ACP (advance care planning)  Assessment & Plan  Plan of care and rationale for hospitalization discussed  FULL code status confirmed  ACP: 15mins    Anxiety  Assessment & Plan  PRN Xanax    Status post below knee amputation of left lower extremity- (present on admission)  Assessment & Plan  S/p left BKA  PT/OT    Hypothyroid- (present on admission)  Assessment & Plan  Synthroid

## 2021-06-06 ENCOUNTER — APPOINTMENT (OUTPATIENT)
Dept: RADIOLOGY | Facility: MEDICAL CENTER | Age: 66
DRG: 291 | End: 2021-06-06
Attending: STUDENT IN AN ORGANIZED HEALTH CARE EDUCATION/TRAINING PROGRAM
Payer: MEDICARE

## 2021-06-06 ENCOUNTER — APPOINTMENT (OUTPATIENT)
Dept: CARDIOLOGY | Facility: MEDICAL CENTER | Age: 66
DRG: 291 | End: 2021-06-06
Attending: STUDENT IN AN ORGANIZED HEALTH CARE EDUCATION/TRAINING PROGRAM
Payer: MEDICARE

## 2021-06-06 LAB
ALBUMIN SERPL BCP-MCNC: 2.7 G/DL (ref 3.2–4.9)
BASOPHILS # BLD AUTO: 0.3 % (ref 0–1.8)
BASOPHILS # BLD: 0.02 K/UL (ref 0–0.12)
BUN SERPL-MCNC: 69 MG/DL (ref 8–22)
CALCIUM SERPL-MCNC: 8.9 MG/DL (ref 8.5–10.5)
CHLORIDE SERPL-SCNC: 90 MMOL/L (ref 96–112)
CO2 SERPL-SCNC: 27 MMOL/L (ref 20–33)
CREAT SERPL-MCNC: 14.22 MG/DL (ref 0.5–1.4)
EOSINOPHIL # BLD AUTO: 0.12 K/UL (ref 0–0.51)
EOSINOPHIL NFR BLD: 1.8 % (ref 0–6.9)
ERYTHROCYTE [DISTWIDTH] IN BLOOD BY AUTOMATED COUNT: 63.2 FL (ref 35.9–50)
GLUCOSE SERPL-MCNC: 62 MG/DL (ref 65–99)
HCT VFR BLD AUTO: 34.8 % (ref 42–52)
HGB BLD-MCNC: 11.3 G/DL (ref 14–18)
IMM GRANULOCYTES # BLD AUTO: 0.03 K/UL (ref 0–0.11)
IMM GRANULOCYTES NFR BLD AUTO: 0.4 % (ref 0–0.9)
INR PPP: 2.73 (ref 0.87–1.13)
LV EJECT FRACT  99904: 30
LV EJECT FRACT MOD 2C 99903: 36
LV EJECT FRACT MOD 4C 99902: 25.37
LV EJECT FRACT MOD BP 99901: 31.56
LYMPHOCYTES # BLD AUTO: 0.98 K/UL (ref 1–4.8)
LYMPHOCYTES NFR BLD: 14.3 % (ref 22–41)
MCH RBC QN AUTO: 33 PG (ref 27–33)
MCHC RBC AUTO-ENTMCNC: 32.5 G/DL (ref 33.7–35.3)
MCV RBC AUTO: 101.8 FL (ref 81.4–97.8)
MONOCYTES # BLD AUTO: 0.71 K/UL (ref 0–0.85)
MONOCYTES NFR BLD AUTO: 10.4 % (ref 0–13.4)
NEUTROPHILS # BLD AUTO: 4.98 K/UL (ref 1.82–7.42)
NEUTROPHILS NFR BLD: 72.8 % (ref 44–72)
NRBC # BLD AUTO: 0 K/UL
NRBC BLD-RTO: 0 /100 WBC
PHOSPHATE SERPL-MCNC: 6.4 MG/DL (ref 2.5–4.5)
PLATELET # BLD AUTO: 149 K/UL (ref 164–446)
PMV BLD AUTO: 10 FL (ref 9–12.9)
POTASSIUM SERPL-SCNC: 3.2 MMOL/L (ref 3.6–5.5)
PROTHROMBIN TIME: 29.8 SEC (ref 12–14.6)
RBC # BLD AUTO: 3.42 M/UL (ref 4.7–6.1)
SODIUM SERPL-SCNC: 133 MMOL/L (ref 135–145)
WBC # BLD AUTO: 6.8 K/UL (ref 4.8–10.8)

## 2021-06-06 PROCEDURE — A9270 NON-COVERED ITEM OR SERVICE: HCPCS | Performed by: INTERNAL MEDICINE

## 2021-06-06 PROCEDURE — 700102 HCHG RX REV CODE 250 W/ 637 OVERRIDE(OP): Performed by: INTERNAL MEDICINE

## 2021-06-06 PROCEDURE — 700102 HCHG RX REV CODE 250 W/ 637 OVERRIDE(OP): Performed by: STUDENT IN AN ORGANIZED HEALTH CARE EDUCATION/TRAINING PROGRAM

## 2021-06-06 PROCEDURE — 99232 SBSQ HOSP IP/OBS MODERATE 35: CPT | Performed by: STUDENT IN AN ORGANIZED HEALTH CARE EDUCATION/TRAINING PROGRAM

## 2021-06-06 PROCEDURE — 97166 OT EVAL MOD COMPLEX 45 MIN: CPT

## 2021-06-06 PROCEDURE — 97161 PT EVAL LOW COMPLEX 20 MIN: CPT

## 2021-06-06 PROCEDURE — 700111 HCHG RX REV CODE 636 W/ 250 OVERRIDE (IP): Performed by: STUDENT IN AN ORGANIZED HEALTH CARE EDUCATION/TRAINING PROGRAM

## 2021-06-06 PROCEDURE — A9270 NON-COVERED ITEM OR SERVICE: HCPCS | Performed by: STUDENT IN AN ORGANIZED HEALTH CARE EDUCATION/TRAINING PROGRAM

## 2021-06-06 PROCEDURE — 74240 X-RAY XM UPR GI TRC 1CNTRST: CPT

## 2021-06-06 PROCEDURE — 93306 TTE W/DOPPLER COMPLETE: CPT | Mod: 26 | Performed by: INTERNAL MEDICINE

## 2021-06-06 PROCEDURE — 93306 TTE W/DOPPLER COMPLETE: CPT

## 2021-06-06 PROCEDURE — 36415 COLL VENOUS BLD VENIPUNCTURE: CPT

## 2021-06-06 PROCEDURE — 770001 HCHG ROOM/CARE - MED/SURG/GYN PRIV*

## 2021-06-06 PROCEDURE — 85610 PROTHROMBIN TIME: CPT

## 2021-06-06 PROCEDURE — 85025 COMPLETE CBC W/AUTO DIFF WBC: CPT

## 2021-06-06 PROCEDURE — 80069 RENAL FUNCTION PANEL: CPT

## 2021-06-06 PROCEDURE — 90945 DIALYSIS ONE EVALUATION: CPT

## 2021-06-06 RX ORDER — METOCLOPRAMIDE 10 MG/1
5 TABLET ORAL
Status: DISCONTINUED | OUTPATIENT
Start: 2021-06-06 | End: 2021-06-10

## 2021-06-06 RX ORDER — BISACODYL 10 MG
10 SUPPOSITORY, RECTAL RECTAL DAILY
Status: DISCONTINUED | OUTPATIENT
Start: 2021-06-06 | End: 2021-06-17 | Stop reason: HOSPADM

## 2021-06-06 RX ADMIN — GENTAMICIN SULFATE: 1 OINTMENT TOPICAL at 07:51

## 2021-06-06 RX ADMIN — ISOSORBIDE DINITRATE 30 MG: 30 TABLET ORAL at 18:46

## 2021-06-06 RX ADMIN — OXYCODONE 5 MG: 5 TABLET ORAL at 18:45

## 2021-06-06 RX ADMIN — CYANOCOBALAMIN TAB 500 MCG 500 MCG: 500 TAB at 04:59

## 2021-06-06 RX ADMIN — DOCUSATE SODIUM 50 MG AND SENNOSIDES 8.6 MG 1 TABLET: 8.6; 5 TABLET, FILM COATED ORAL at 18:46

## 2021-06-06 RX ADMIN — ONDANSETRON 4 MG: 2 INJECTION INTRAMUSCULAR; INTRAVENOUS at 16:05

## 2021-06-06 RX ADMIN — PRASUGREL 10 MG: 10 TABLET, FILM COATED ORAL at 05:00

## 2021-06-06 RX ADMIN — OXYCODONE HYDROCHLORIDE AND ACETAMINOPHEN 500 MG: 500 TABLET ORAL at 05:00

## 2021-06-06 RX ADMIN — ALPRAZOLAM 0.25 MG: 0.25 TABLET ORAL at 18:45

## 2021-06-06 RX ADMIN — RANOLAZINE 500 MG: 500 TABLET, FILM COATED, EXTENDED RELEASE ORAL at 18:45

## 2021-06-06 RX ADMIN — BISACODYL 10 MG: 10 SUPPOSITORY RECTAL at 18:35

## 2021-06-06 RX ADMIN — ASPIRIN 81 MG: 81 TABLET, COATED ORAL at 04:59

## 2021-06-06 RX ADMIN — CARVEDILOL 6.25 MG: 6.25 TABLET, FILM COATED ORAL at 18:45

## 2021-06-06 RX ADMIN — Medication 2000 UNITS: at 05:00

## 2021-06-06 RX ADMIN — DOCUSATE SODIUM 50 MG AND SENNOSIDES 8.6 MG 1 TABLET: 8.6; 5 TABLET, FILM COATED ORAL at 05:00

## 2021-06-06 RX ADMIN — METOCLOPRAMIDE 5 MG: 10 TABLET ORAL at 21:28

## 2021-06-06 RX ADMIN — GENTAMICIN SULFATE: 1 OINTMENT TOPICAL at 17:19

## 2021-06-06 RX ADMIN — LEVOTHYROXINE SODIUM 150 MCG: 0.15 TABLET ORAL at 05:00

## 2021-06-06 RX ADMIN — WARFARIN SODIUM 2.5 MG: 2.5 TABLET ORAL at 18:46

## 2021-06-06 RX ADMIN — ATORVASTATIN CALCIUM 40 MG: 40 TABLET, FILM COATED ORAL at 21:28

## 2021-06-06 RX ADMIN — ISOSORBIDE DINITRATE 30 MG: 30 TABLET ORAL at 04:59

## 2021-06-06 ASSESSMENT — ENCOUNTER SYMPTOMS
VOMITING: 1
SPUTUM PRODUCTION: 0
CONSTIPATION: 1
PALPITATIONS: 0
COUGH: 0
DEPRESSION: 0
ORTHOPNEA: 0
SHORTNESS OF BREATH: 0
SORE THROAT: 0
HEARTBURN: 0
BLURRED VISION: 0
NECK PAIN: 0
HEADACHES: 0
MYALGIAS: 0
CHILLS: 0
NAUSEA: 1
WEIGHT LOSS: 1
FEVER: 0
FOCAL WEAKNESS: 0
DIZZINESS: 0
ABDOMINAL PAIN: 0

## 2021-06-06 ASSESSMENT — COGNITIVE AND FUNCTIONAL STATUS - GENERAL
EATING MEALS: A LITTLE
WALKING IN HOSPITAL ROOM: A LITTLE
MOVING FROM LYING ON BACK TO SITTING ON SIDE OF FLAT BED: A LOT
TURNING FROM BACK TO SIDE WHILE IN FLAT BAD: A LITTLE
PERSONAL GROOMING: A LITTLE
MOBILITY SCORE: 17
CLIMB 3 TO 5 STEPS WITH RAILING: A LITTLE
SUGGESTED CMS G CODE MODIFIER MOBILITY: CK
MOVING TO AND FROM BED TO CHAIR: A LITTLE
HELP NEEDED FOR BATHING: A LITTLE
TOILETING: A LITTLE
STANDING UP FROM CHAIR USING ARMS: A LITTLE
DAILY ACTIVITIY SCORE: 20
SUGGESTED CMS G CODE MODIFIER DAILY ACTIVITY: CJ

## 2021-06-06 ASSESSMENT — GAIT ASSESSMENTS
GAIT LEVEL OF ASSIST: MINIMAL ASSIST
DEVIATION: DECREASED BASE OF SUPPORT
DISTANCE (FEET): 6
ASSISTIVE DEVICE: CANADIAN (LOFSTRAND) CRUTCHES

## 2021-06-06 ASSESSMENT — ACTIVITIES OF DAILY LIVING (ADL): TOILETING: INDEPENDENT

## 2021-06-06 ASSESSMENT — PAIN DESCRIPTION - PAIN TYPE: TYPE: CHRONIC PAIN

## 2021-06-06 NOTE — PROGRESS NOTES
Inpatient Anticoagulation Service Note    Date: 6/6/2021    Reason for Anticoagulation: Atrial Fibrillation, Other (Peripheral Vascular Disease)   Target INR: 2.0 to 3.0  [id number] VASc Score: 2  HAS-BLED Score: 3   Hemoglobin Value: (!) 11.3  Hematocrit Value: (!) 34.8  Lab Platelet Value: (!) 149    INR from last 7 days     Date/Time INR Value    06/06/21 07:30:01  (!) 2.73    06/05/21 0744  (!) 2.31        Dose from last 7 days     Date/Time Dose (mg)    06/06/21 1246  2.5    06/05/21 1212  2.5    06/04/21 2152  2.5        Average Dose (mg):  (Home dose: warfarin 2.5mg po daily)  Significant Interactions: Antiplatelet Medications, Proton Pump Inhibitor, Statin, Thyroid Medications  Bridge Therapy: No   Reversal Agent Administered: Not Applicable    Comments: Patient continues on warfarin for AF and PVD. INR therapeutic today. H/H stable and no signs of active bleeding. DDI noted above. Regular diet ordered, minimal intake per chart review. Previous warfarin dose recieved. INR theraputic, will continue home dose of warfarin 2.5mg po daily with repeat INR tomorrow.    Plan:  Warfarin 2.5mg po daily  Education Material Provided?: No (Home medication)  Pharmacist suggested discharge dosing: tentatively plan on continuing home dose of 2.5mg po daily with repeat INR within 2-3 days of discharge.      Stefanie Romano, PharmD

## 2021-06-06 NOTE — PROGRESS NOTES
[facility] Medicine Daily Progress Note    Date of Service  6/6/2021    Chief Complaint  66 y.o. male presented 6/4/2021 with N/V, unable to tolerate po and constipation    Hospital Course  Per Dr. Kaye's HPI:  66 y.o. M with ESRD on CCPD, CAD s/p multiple stents, valvular heart disease, HTN, HLD presented 6/4/2021 to [facility] with complaint of nausea, vomiting poor oral intake and weight loss of 45 pounds over the past month; then subsequently transferred to [facility] as a divert on the same day.  It was noted that he had CTA abdominal pelvis revealed SMA stenosis. He reported having history of C. difficile in April 2020, has not had diarrhea over the past month and reported to have negative C. difficile study a week ago. Upon admission, patient appeared comfortable with no deranged vital signs.  Abdominal exam is benign, doubt he has SMA stenosis given benign exam and on warfarin with therapeutic INR.  Additionally, he does take Effient in addition to warfarin for his vascular disease.  However, CTAP with contrast was ordered on admisison to ensure no SMA thrombosis.  Patient was admitted to medicine service for further evaluation and treatment.    6/5: vitals wnl; afebrile. No pain reported.   Vitals reviewed; afebrile.  The rest of the vitals within normal parameters.  [facility] Nephrology Dr. Cantrell - knows this patient well. Peritoneal HD restarted.  During rounds, tolerated oral diet and reported feeling better. Refused suppository and wanted to try oral laxative first.   CT abd/pelvis with: the SMA and celiac axis are noted to be patent;  No acute abdominal or pelvic findings. CXR: no acute cardiopulm disease  US ABd: No sonographic signs of acute cholecystitis; Echogenic renal parenchyma of both kidneys with atrophy of the left kidney.    Interval Problem Update  6/6  Vitals reviewed; afebrile.  The rest of the vitals within normal parameters.  Pain scale reported - 4 in left leg  Blood glucose in  last 24hrs - in 60-70s    At bedside, a bit discouraged. Lunch/dinner was late plus peritoneal HD was early. With all these changes in routine, he vomited lunch when Miralax was given. Still no BM on oral laxatives.     Current challenges with inability to tolerate po, N/V and severe constipation discussed - this has happened multiple times. Previously resolved on his own. Early diet tray ordered. He agreed for suppository and UGI series requested.     Labs reviewed  Na 133, Cr 14.22  WBC 6.8, Plt 149  INR 2.73    CT Chest: multifocal bilateral airspace process, 14 mm right middle lobe groundglass density, posterior aspect right lower lobe 12 mm area of spiculation and probably underlying emphysema. Pulmonary findings are nonspecific and could be inflammation, infection, or postinflammatory scarring. Neoplasm is less likely.    Consultants/Specialty  Nephrology    Code Status  Full Code    Disposition  Likely home when medically clear    Discussed with patient, patient's nurse and with multidisciplinary team during rounds including , pharmacist and charge nurse.      I have performed the physical examination, and reviewed updated ROS and plan today 6/6/2021.  In review of yesterday's note, there are no new changes except as documented above or bolded below.      Review of Systems  Review of Systems   Constitutional: Positive for weight loss. Negative for chills, fever and malaise/fatigue.   HENT: Negative for sore throat.    Eyes: Negative for blurred vision.   Respiratory: Negative for cough, sputum production and shortness of breath.    Cardiovascular: Negative for chest pain, palpitations, orthopnea and leg swelling.   Gastrointestinal: Positive for constipation, nausea and vomiting. Negative for abdominal pain and heartburn.   Genitourinary: Negative for dysuria, frequency and urgency.   Musculoskeletal: Negative for myalgias and neck pain.        Nerve pain in right leg   Neurological: Negative for  dizziness, focal weakness and headaches.   Psychiatric/Behavioral: Negative for depression.        Physical Exam  Temp:  [36.2 °C (97.1 °F)-37.3 °C (99.2 °F)] 37.3 °C (99.2 °F)  Pulse:  [74-96] 84  Resp:  [16-20] 16  BP: ()/(56-71) 115/59  SpO2:  [93 %-99 %] 93 %    Physical Exam  Vitals and nursing note reviewed.   Constitutional:       General: He is not in acute distress.     Appearance: Normal appearance. He is not ill-appearing.      Comments: Cachetic   HENT:      Head: Normocephalic and atraumatic.      Mouth/Throat:      Mouth: Mucous membranes are moist.      Pharynx: Oropharynx is clear.   Eyes:      General: No scleral icterus.     Conjunctiva/sclera: Conjunctivae normal.   Cardiovascular:      Rate and Rhythm: Normal rate and regular rhythm.      Pulses: Normal pulses.      Heart sounds: Murmur heard.     Pulmonary:      Effort: Pulmonary effort is normal. No respiratory distress.      Breath sounds: Normal breath sounds. No wheezing.   Abdominal:      General: Bowel sounds are normal. There is no distension.      Palpations: Abdomen is soft.      Tenderness: There is no abdominal tenderness.      Comments: Peritoneal catheter noted (C/D/I)   Musculoskeletal:         General: No swelling or tenderness. Normal range of motion.      Comments: Left BKA stump - C/D/I   Skin:     General: Skin is warm and dry.      Capillary Refill: Capillary refill takes less than 2 seconds.   Neurological:      General: No focal deficit present.      Mental Status: He is alert and oriented to person, place, and time. Mental status is at baseline.   Psychiatric:         Mood and Affect: Mood normal.         Fluids    Intake/Output Summary (Last 24 hours) at 6/6/2021 1236  Last data filed at 6/6/2021 1000  Gross per 24 hour   Intake 641 ml   Output 0 ml   Net 641 ml       Laboratory  Recent Labs     06/04/21  0730 06/05/21  0744 06/06/21  0730   WBC 9.7 7.6 6.8   RBC 4.35* 3.64* 3.42*   HEMOGLOBIN 14.7 12.2* 11.3*    HEMATOCRIT 44.6 37.5* 34.8*   .5* 103.0* 101.8*   MCH 33.8* 33.5* 33.0   MCHC 33.0 32.5* 32.5*   RDW 17.2* 66.5* 63.2*   PLATELETCT 178 155* 149*   MPV 9.9 9.8 10.0     Recent Labs     06/04/21  0730 06/05/21  0744 06/06/21  0730   SODIUM 136 131* 133*   POTASSIUM 3.1* 3.7 3.2*   CHLORIDE 91* 89* 90*   CO2 30 23 27   GLUCOSE 89 65 62*   BUN 71* 72* 69*   CREATININE 13.4* 15.17* 14.22*   CALCIUM 9.6 9.2 8.9     Recent Labs     06/04/21  0730 06/05/21  0744 06/06/21  0730   APTT 26.5  --   --    INR 2.21 2.31* 2.73*               Imaging  CT-CHEST (THORAX) W/O   Final Result      1.  Abnormal lung parenchyma      2.  Specifically, there are multifocal bilateral airspace process, 14 mm right middle lobe groundglass density, posterior aspect right lower lobe 12 mm area of spiculation and probably underlying emphysema.      3.  Pulmonary findings are nonspecific and could be inflammation, infection, or postinflammatory scarring. Neoplasm is less likely.      4.  Dense aortic and branch vessel atherosclerotic plaque      5.  Pulmonary artery hypertension      6.  Very small bilateral pleural effusion      Fleischner Society pulmonary nodule recommendations:      Ground Glass and Part Solid: No routine follow-up      Comments: In certain suspicious nodules less than 6 mm, consider follow-up at 2 and 4 years. If solid component(s) or growth develops, consider resection.      Note: These recommendations do not apply to lung cancer screening, patients with immunosuppression, or patients with known primary cancer.      Fleischner Society 2017 Guidelines for Management of Incidentally Detected Pulmonary Nodules in Adults      OUTSIDE IMAGES-US ABDOMEN   Final Result      OUTSIDE IMAGES-DX CHEST   Final Result      CT-ABDOMEN-PELVIS WITH   Final Result   Addendum 1 of 1   On further review, the SMA and celiac axis are noted to be patent.    Contrast is difficult to confirm within the diminutive and markedly     atherosclerotic renal arteries.      Final      1.  No acute abdominal or pelvic findings.   2.  Bilaterally small atrophic appearing kidneys consistent with renal failure, with a dialysis catheter noted in the pelvic cavity.   3.  Colonic diverticulosis without diverticulitis.      EC-ECHOCARDIOGRAM COMPLETE W/O CONT    (Results Pending)   DX-UPPER GI-SMALL BOWEL FOLLOW THRU    (Results Pending)        Assessment/Plan  * Failure to thrive in adult  Assessment & Plan  Reported weight loss of 45 pounds over the past few months due to poor oral intake  CT abd/pelvis with no evidence of mass or tumor  CT chest - noted nodules (will need to follow up outpatient); may be a candidate for possible outpatient colonoscopy   RD consult  Current challenges with inability to tolerate po, N/V and severe constipation discussed - this has happened multiple times. Previously resolved on his own. Early diet tray ordered. He agreed for suppository and UGI series requested.         ACP (advance care planning)  Assessment & Plan  Plan of care and rationale for hospitalization discussed on admission  FULL code status confirmed      Anxiety  Assessment & Plan  PRN Xanax    PVD (peripheral vascular disease) (HCC)  Assessment & Plan  S/p left BKA  Former smoker  H/o vein graft  Cont Warfarin/Effient/statin    Severe protein-calorie malnutrition (HCC)  Assessment & Plan  PO intake as tolerated  Ensure  RD consult    Superior mesenteric artery stenosis (HCC)  Assessment & Plan  Transferred for concern for SMA thrombosis -- reported to have +finding on CT AP previously - however, no report seen at [facility] Arrival    CT AP at [facility]: no SMA thrombosis  Doubtful given therapeutic INR while on warfarin and benign abdominal exam    CAD (coronary artery disease)- (present on admission)  Assessment & Plan  Warfarin/Effient/BB/statin  Isodil  Ranexa for angina    Essential hypertension- (present on admission)  Assessment & Plan  Coreg    Anemia in  chronic kidney disease (CKD)- (present on admission)  Assessment & Plan  Chronic, no gross bleeding    C. difficile colitis  Assessment & Plan  H/o  Reported having negative result a week prior  No WBC, Cr elevated due to ESRD  No toxic swetha colon seen on CT AP  No diarrhea so far    ESRD on peritoneal dialysis (HCC)- (present on admission)  Assessment & Plan  Continue PD  Continue phosphate binders  Nephrology consulted for assistance with care    Intractable nausea and vomiting  Assessment & Plan  IVF, antiemetic  Supportive care  No acute etiology seen on CTAP  Could be back up from constipation    Status post below knee amputation of left lower extremity- (present on admission)  Assessment & Plan  S/p left BKA  PT/OT    Mixed hyperlipidemia- (present on admission)  Assessment & Plan  Statin    Hypothyroid- (present on admission)  Assessment & Plan  Synthroid       VTE prophylaxis: on coumadin (INR therapeutic)

## 2021-06-06 NOTE — THERAPY
Occupational Therapy   Initial Evaluation     Patient Name: Francis Purvis  Age:  66 y.o., Sex:  male  Medical Record #: [id number]  Today's Date: 6/6/2021     Precautions  Precautions: (P) Fall Risk    Assessment  Patient is 66 y.o. male with a diagnosis of nausea, vomiting, weakness.  Additional factors influencing patient status / progress: good family support available at home.      Plan    Recommend Occupational Therapy for Evaluation only for the following treatments:  NA.    DC Equipment Recommendations: (P) None  Discharge Recommendations: (P) Recommend home health for continued occupational therapy services     Subjective    Pleasant and cooperative. Function is limited by nausea/ light headedness.     Objective       06/06/21 0740   Total Time Spent   Total Time Spent (Mins)    Charge Group   OT Evaluation OT Evaluation Mod   Initial Contact Note    Initial Contact Note Order Received and Verified, Evaluation Only - Patient Does Not Require Further Acute Occupational Therapy at this Time.  However, May Benefit from Post Acute Therapy for Higher Level Functional Deficits.   Prior Living Situation   Prior Services None   Housing / Facility 1 Story House   Steps Into Home 3   Steps In Home 0   Bathroom Set up Walk In Shower;Shower Chair;Grab Bars   Equipment Owned [place] [facility] Crutches   Lives with - Patient's Self Care Capacity Spouse   Prior Level of ADL Function   Self Feeding Independent   Grooming / Hygiene Independent   Bathing Independent   Dressing Independent   Toileting Independent   Prior Level of IADL Function   Medication Management Independent   Laundry Requires Assist   Kitchen Mobility Independent   Finances Requires Assist   Home Management Requires Assist   Shopping Requires Assist   Prior Level Of Mobility Supervision With Device in Home   Precautions   Precautions Fall Risk   Vitals   O2 Delivery Device None - Room Air   Pain 0 - 10 Group   Therapist Pain Assessment During  Activity;Post Activity Pain Same as Prior to Activity;Nurse Notified;0   Cognition    Cognition / Consciousness WDL   Level of Consciousness Alert   Passive ROM Upper Body   Passive ROM Upper Body WDL   Active ROM Upper Body   Active ROM Upper Body  WDL   Dominant Hand Right   Strength Upper Body   Upper Body Strength  X   Gross Strength Generalized Weakness, Equal Bilaterally.    Comments sufficient for basic function, fatigues quickly   Sensation Upper Body   Upper Extremity Sensation  WDL   Upper Body Muscle Tone   Upper Body Muscle Tone  WDL   Coordination Upper Body   Coordination WDL   Balance Assessment   Sitting Balance (Static) Good   Sitting Balance (Dynamic) Fair +   Standing Balance (Static) Good   Standing Balance (Dynamic) Fair   Weight Shift Sitting Good   Weight Shift Standing Fair   Comments limited by light headedness/ nausea. using forearm crutches   Bed Mobility    Supine to Sit Supervised   Sit to Supine Supervised   Scooting Supervised   Rolling Supervised   ADL Assessment   Eating Supervision   Grooming Supervision;Seated   Bathing   (NT)   Upper Body Dressing Supervision   Lower Body Dressing Supervision   Toileting Supervision   How much help from another person does the patient currently need...   Putting on and taking off regular lower body clothing? 4   Bathing (including washing, rinsing, and drying)? 3   Toileting, which includes using a toilet, bedpan, or urinal? 3   Putting on and taking off regular upper body clothing? 4   Taking care of personal grooming such as brushing teeth? 3   Eating meals? 3   6 Clicks Daily Activity Score 20   Functional Mobility   Sit to Stand Supervised   Bed, Chair, Wheelchair Transfer Supervised   Toilet Transfers Supervised   Transfer Method Stand Pivot   Comments function is limited by lightheadedness.    Visual Perception   Visual Perception  WDL   Activity Tolerance   Sitting Edge of Bed ad star   Education Group   Role of Occupational Therapist  Patient Response Patient;Significant Other;Acceptance;Explanation;Demonstration;Verbal Demonstration;Action Demonstration   Anticipated Discharge Equipment and Recommendations   DC Equipment Recommendations None   Discharge Recommendations Recommend home health for continued occupational therapy services   Interdisciplinary Plan of Care Collaboration   IDT Collaboration with  Nursing;Physical Therapist   Patient Position at End of Therapy Seated;Edge of Bed;Call Light within Reach;Tray Table within Reach;Phone within Reach;Family / Friend in Room   Collaboration Comments report given   Session Information   Date / Session Number  6/6, 1/1   Priority 0

## 2021-06-06 NOTE — PROGRESS NOTES
Aseptically disconnected from CCPD,effluent clear yellow,no fibrin.No UF but absorbed 401ml today,per patient he still have episodes of n/v.Dr guan notified.PD exit site dressing changed  and gent ointment applied per patients request,report give to primary RN.

## 2021-06-06 NOTE — PROGRESS NOTES
[facility] Progress Note     CCPD initiated at 1835 to run for 9 hours using 1.5% PD solution per Dr. KELLY Cantrell.     Patient assessed prior to therapy, consent for treatment obtained.   Orders reviewed and updated, program settings entered/verified.  Patient AOx4. VS stable. Patient no complaints.   Procedure explained to patient, verbalized understanding.  LLQ PD catheter aseptically connected PD cycler without any issues.   Exit site cleansed and dressings changed, no signs of infection noted at exit site.      Report given to primary care RN LONA Tyler including troubleshooting, emergency disconnection procedure, on-call Dialysis RN contact information ([phone]).      Please call for any issues with hemodynamic instability/persistent alarms/patient not tolerating therapy.

## 2021-06-06 NOTE — PROGRESS NOTES
"[facility] -  PROGRESS NOTE               Author: Skinny Cantrell M.D. Date & Time: 6/6/2021  10:04 AM     HPI:  67 yo M PMH ESRD CCPD, HTN, anemia of CKD, CKD-MBD, HLD, and CAD who presents to ED with CC as above.  He has chronic intermittent N/V that he's been battling for a long time.  It leads to wide fluctuations in his po intake.  He also has severe CAD and non-cardiac atherosclerosis.  Recently he feel she has lost 45 lbs in the past month due to the N/V.  He's had a CTA in past that has revealed SMA steosis, but report was not available here at this institution.  Repeat imaging ordered by admit MD and he was admitted for further evaluation.  Has no abd pain and otherwise denies any associated F/C/CP/SOB.  No melena, hematochezia, hematemesis.  No HA, visual changes.    DAILY NEPHROLOGY SUMMARY:  6/05 - Consult done  6/06 - NAEO, +N/V; Unable to keep majority of food down    REVIEW OF SYSTEMS:    10 point ROS reviewed and is as per HPI/daily summary or otherwise negative    PMH/PSH/SH/FH: Reviewed and unchanged since admission note  CURRENT MEDICATIONS: Reviewed from admission to present day    VS:  /59   Pulse 84   Temp 37.3 °C (99.2 °F) (Temporal)   Resp 16   Ht 1.778 m (5' 10\")   Wt 50.5 kg (111 lb 5.3 oz) Comment: 3.8 prosthetic/shoes subtracted  SpO2 93%   BMI 15.97 kg/m²   Physical Exam  Nursing note reviewed.   Constitutional:       Appearance: Normal appearance.   Neck:      Trachea: No tracheal tenderness.   Cardiovascular:      Rate and Rhythm: Normal rate.      Comments: No edema  Pulmonary:      Effort: Pulmonary effort is normal. No respiratory distress.   Abdominal:      General: There is no distension.   Musculoskeletal:         General: No deformity.   Skin:     Findings: No rash.   Neurological:      Mental Status: He is alert and oriented to person, place, and time.      Motor: No atrophy.   Psychiatric:         Behavior: Behavior normal. "     Fluids:  In: 240 [P.O.:240]  Out: 0     LABS:  Recent Labs     06/04/21  0730 06/05/21  0744 06/06/21  0730   SODIUM 136 131* 133*   POTASSIUM 3.1* 3.7 3.2*   CHLORIDE 91* 89* 90*   CO2 30 23 27   GLUCOSE 89 65 62*   BUN 71* 72* 69*   CREATININE 13.4* 15.17* 14.22*   CALCIUM 9.6 9.2 8.9       IMPRESSION:  # ESRD  - Etiology likely 2/2 HTN  - PD Rx:  * 9hrs tx time; 6 x 2.5L exchanges  * 2.5/1.5 % fluid; No last fill/no mid day exchange  - Maybe failing PD due to his GI issues, nutrition status is poor  - Discussed that possible conversion to iHD with IDPN maybe needed  # N/V  - Etiology unclear  - Chronic intermittent issue  - Followed by GI and has had extensive work up  - Atherosclerosis of GI vasculature a possibility  # HTN  - Goal BP < 140/90  - Stable  # Anemia of CKD  - Goal Hgb 10-11  - Stable  # CKD-MBD  - PO4 pen  - Ca pen  - PTH pen  - Vit D pen     PLAN:  - Daily CCPD  - Tweak Rx of PD as needed  - No dietary protein restrictions  - Dose all meds per ESRD  - Regular diet ok  - No fluid restrictions at this time  - Nepro with meals  - MBD panel in AM

## 2021-06-06 NOTE — THERAPY
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 male admitted with n/v, weight loss and weakness. Complicated PMH including ESRD on PD, CAD, L BKA, cdiff. Pt lives with his wife who is able to assist as needed with mobility and ADLs. Prior to admission, pts wife was providing SBA for mobility due to progressive weakness. During initial PT eval, pt required min assist for STS and 6ft of gait with R [place] crutch. Distance limited by patients complaint of nausea and dizziness. Wife at bedside reported she could care for him at this level of mobility but she wants to make sure that the find out what is causing his symptoms before giong home. PT will cont while in acute care setting to address deficits.     Plan    Recommend Physical Therapy 3 times per week until therapy goals are met for the following treatments:  Gait Training, Neuro Re-Education / Balance, Self Care/Home Evaluation, Stair Training, Therapeutic Activities and Therapeutic Exercises    DC Equipment Recommendations: None  Discharge Recommendations: Recommend home health for continued physical therapy services (with assist from wife for ADLs and mobility)          06/06/21 0841   Prior Living Situation   Prior Services Intermittent Physical Support for ADL Per Family   Housing / Facility 1 Story House   Steps Into Home 3   Steps In Home 0   Bathroom Set up Walk In Shower;Shower Chair;Grab Bars   Equipment Owned [place] ([employer]) Crutches   Lives with - Patient's Self Care Capacity Spouse   Comments pts wife assists some with showering and IADLs. She is willing and able to assist as needed at dc   Prior Level of Functional Mobility   Bed Mobility Independent   Transfer Status Independent   Ambulation Independent   Distance Ambulation (Feet)    (household)   Assistive Devices Used [place] (L[place]strand) Crutches   Stairs Required Assist   Comments wife would assist pt as needed for safety   Cognition    Level of Consciousness Alert   Comments cooperative   Strength Lower Body   Lower Body Strength  X   Gross Strength Generalized Weakness, Equal Bilaterally   Balance Assessment   Sitting Balance (Static) Fair +   Sitting Balance (Dynamic) Fair +   Standing Balance (Static) Fair   Standing Balance (Dynamic) Fair -   Weight Shift Sitting Good   Weight Shift Standing Fair   Comments R lofstrand crutch   Gait Analysis   Gait Level Of Assist Minimal Assist   Assistive Device [place] (Lofstrand) Crutches   Distance (Feet) 6   # of Times Distance was Traveled 1   Deviation Decreased Base Of Support   # of Stairs Climbed 0   Weight Bearing Status no restrictions   Comments primarily limited by nausea and dizziness. /60, no improvement with increased time EOB   Bed Mobility    Supine to Sit Supervised   Sit to Supine Supervised   Scooting Supervised   Rolling Supervised   Functional Mobility   Sit to Stand Minimal Assist   Bed, Chair, Wheelchair Transfer Minimal Assist   Transfer Method Stand Step   Mobility in room with crutch   Short Term Goals    Short Term Goal # 1 pt will be able to complete functional transfers with lofstrand crutch and SPV in 6tx in order to return [facility]   Short Term Goal # 2 pt will be able to ambulate 150ft with lofstrand crutch and SPV in 6tx in order to return home at baseline   Short Term Goal # 3 pt will be able to negotiate 3 steps with min assist and crutch in 6tx in order to enter and exit home with assist of wife   Anticipated Discharge Equipment and Recommendations   DC Equipment Recommendations None   Discharge Recommendations Recommend home health for continued physical therapy services  (with assist from wife for ADLs and mobility)

## 2021-06-06 NOTE — CARE PLAN
The patient is Stable - Low risk of patient condition declining or worsening         Progress made toward(s) clinical / shift goals:        Problem: Knowledge Deficit - Standard  Goal: Patient and family/care givers will demonstrate understanding of plan of care, disease process/condition, diagnostic tests and medications  Outcome: Progressing  Note: Pt updated on plan of care. Pt encouraged to ask questions and questions were answered. Call light within reach. Pt reminded to use call light whenever needing assistance.       Problem: Pain - Standard  Goal: Alleviation of pain or a reduction in pain to the patient’s comfort goal  Outcome: Progressing  Note: Pt experiencing pain in his leg earlier in the night. States it usually is like that every night, but the oxycodone helps soothe the pain and allows him to get some rest. Pt was given pain medication as needed and as ordered. Pt was able to sleep comfortably throughout the night.        Patient is not progressing towards the following goals:    N/a

## 2021-06-07 PROBLEM — I50.22 SYSTOLIC CHF, CHRONIC (HCC): Status: ACTIVE | Noted: 2021-06-07

## 2021-06-07 LAB
ANION GAP SERPL CALC-SCNC: 15 MMOL/L (ref 7–16)
BASOPHILS # BLD AUTO: 0.5 % (ref 0–1.8)
BASOPHILS # BLD: 0.04 K/UL (ref 0–0.12)
BUN SERPL-MCNC: 59 MG/DL (ref 8–22)
CALCIUM SERPL-MCNC: 8.9 MG/DL (ref 8.5–10.5)
CHLORIDE SERPL-SCNC: 89 MMOL/L (ref 96–112)
CO2 SERPL-SCNC: 28 MMOL/L (ref 20–33)
CREAT SERPL-MCNC: 12.77 MG/DL (ref 0.5–1.4)
EOSINOPHIL # BLD AUTO: 0.17 K/UL (ref 0–0.51)
EOSINOPHIL NFR BLD: 2.3 % (ref 0–6.9)
ERYTHROCYTE [DISTWIDTH] IN BLOOD BY AUTOMATED COUNT: 63.1 FL (ref 35.9–50)
GLUCOSE SERPL-MCNC: 70 MG/DL (ref 65–99)
HCT VFR BLD AUTO: 36.5 % (ref 42–52)
HGB BLD-MCNC: 12 G/DL (ref 14–18)
IMM GRANULOCYTES # BLD AUTO: 0.03 K/UL (ref 0–0.11)
IMM GRANULOCYTES NFR BLD AUTO: 0.4 % (ref 0–0.9)
INR PPP: 3.33 (ref 0.87–1.13)
LYMPHOCYTES # BLD AUTO: 0.98 K/UL (ref 1–4.8)
LYMPHOCYTES NFR BLD: 13.3 % (ref 22–41)
MCH RBC QN AUTO: 33.5 PG (ref 27–33)
MCHC RBC AUTO-ENTMCNC: 32.9 G/DL (ref 33.7–35.3)
MCV RBC AUTO: 102 FL (ref 81.4–97.8)
MONOCYTES # BLD AUTO: 0.69 K/UL (ref 0–0.85)
MONOCYTES NFR BLD AUTO: 9.4 % (ref 0–13.4)
NEUTROPHILS # BLD AUTO: 5.45 K/UL (ref 1.82–7.42)
NEUTROPHILS NFR BLD: 74.1 % (ref 44–72)
NRBC # BLD AUTO: 0 K/UL
NRBC BLD-RTO: 0 /100 WBC
PLATELET # BLD AUTO: 151 K/UL (ref 164–446)
PMV BLD AUTO: 9.8 FL (ref 9–12.9)
POTASSIUM SERPL-SCNC: 2.8 MMOL/L (ref 3.6–5.5)
PROTHROMBIN TIME: 34.8 SEC (ref 12–14.6)
RBC # BLD AUTO: 3.58 M/UL (ref 4.7–6.1)
SODIUM SERPL-SCNC: 132 MMOL/L (ref 135–145)
WBC # BLD AUTO: 7.4 K/UL (ref 4.8–10.8)

## 2021-06-07 PROCEDURE — 92610 EVALUATE SWALLOWING FUNCTION: CPT

## 2021-06-07 PROCEDURE — 90945 DIALYSIS ONE EVALUATION: CPT

## 2021-06-07 PROCEDURE — A9270 NON-COVERED ITEM OR SERVICE: HCPCS | Performed by: INTERNAL MEDICINE

## 2021-06-07 PROCEDURE — 700111 HCHG RX REV CODE 636 W/ 250 OVERRIDE (IP): Performed by: STUDENT IN AN ORGANIZED HEALTH CARE EDUCATION/TRAINING PROGRAM

## 2021-06-07 PROCEDURE — 36415 COLL VENOUS BLD VENIPUNCTURE: CPT

## 2021-06-07 PROCEDURE — 85610 PROTHROMBIN TIME: CPT

## 2021-06-07 PROCEDURE — 700102 HCHG RX REV CODE 250 W/ 637 OVERRIDE(OP): Performed by: INTERNAL MEDICINE

## 2021-06-07 PROCEDURE — A9270 NON-COVERED ITEM OR SERVICE: HCPCS | Performed by: STUDENT IN AN ORGANIZED HEALTH CARE EDUCATION/TRAINING PROGRAM

## 2021-06-07 PROCEDURE — 99232 SBSQ HOSP IP/OBS MODERATE 35: CPT | Performed by: STUDENT IN AN ORGANIZED HEALTH CARE EDUCATION/TRAINING PROGRAM

## 2021-06-07 PROCEDURE — 80048 BASIC METABOLIC PNL TOTAL CA: CPT

## 2021-06-07 PROCEDURE — 770001 HCHG ROOM/CARE - MED/SURG/GYN PRIV*

## 2021-06-07 PROCEDURE — 85025 COMPLETE CBC W/AUTO DIFF WBC: CPT

## 2021-06-07 PROCEDURE — 700102 HCHG RX REV CODE 250 W/ 637 OVERRIDE(OP): Performed by: STUDENT IN AN ORGANIZED HEALTH CARE EDUCATION/TRAINING PROGRAM

## 2021-06-07 RX ORDER — WARFARIN SODIUM 2.5 MG/1
1.25 TABLET ORAL
Status: COMPLETED | OUTPATIENT
Start: 2021-06-07 | End: 2021-06-07

## 2021-06-07 RX ORDER — POTASSIUM CHLORIDE 20 MEQ/1
40 TABLET, EXTENDED RELEASE ORAL ONCE
Status: COMPLETED | OUTPATIENT
Start: 2021-06-07 | End: 2021-06-07

## 2021-06-07 RX ORDER — WARFARIN SODIUM 2.5 MG/1
2.5 TABLET ORAL DAILY
Status: DISCONTINUED | OUTPATIENT
Start: 2021-06-08 | End: 2021-06-08

## 2021-06-07 RX ORDER — POTASSIUM CHLORIDE 20 MEQ/1
20 TABLET, EXTENDED RELEASE ORAL DAILY
Status: DISCONTINUED | OUTPATIENT
Start: 2021-06-08 | End: 2021-06-13

## 2021-06-07 RX ORDER — POTASSIUM CHLORIDE 7.45 MG/ML
10 INJECTION INTRAVENOUS
Status: DISCONTINUED | OUTPATIENT
Start: 2021-06-07 | End: 2021-06-07

## 2021-06-07 RX ADMIN — OXYCODONE HYDROCHLORIDE AND ACETAMINOPHEN 500 MG: 500 TABLET ORAL at 05:28

## 2021-06-07 RX ADMIN — ATORVASTATIN CALCIUM 40 MG: 40 TABLET, FILM COATED ORAL at 20:53

## 2021-06-07 RX ADMIN — Medication 2000 UNITS: at 05:28

## 2021-06-07 RX ADMIN — METOCLOPRAMIDE 5 MG: 10 TABLET ORAL at 20:53

## 2021-06-07 RX ADMIN — CARVEDILOL 6.25 MG: 6.25 TABLET, FILM COATED ORAL at 17:55

## 2021-06-07 RX ADMIN — METOCLOPRAMIDE 5 MG: 10 TABLET ORAL at 12:05

## 2021-06-07 RX ADMIN — CYANOCOBALAMIN TAB 500 MCG 500 MCG: 500 TAB at 05:28

## 2021-06-07 RX ADMIN — ISOSORBIDE DINITRATE 30 MG: 30 TABLET ORAL at 05:27

## 2021-06-07 RX ADMIN — ALPRAZOLAM 0.25 MG: 0.25 TABLET ORAL at 20:53

## 2021-06-07 RX ADMIN — OMEPRAZOLE 20 MG: 20 CAPSULE, DELAYED RELEASE ORAL at 05:28

## 2021-06-07 RX ADMIN — BISACODYL 10 MG: 10 SUPPOSITORY RECTAL at 05:27

## 2021-06-07 RX ADMIN — ONDANSETRON 4 MG: 2 INJECTION INTRAMUSCULAR; INTRAVENOUS at 08:11

## 2021-06-07 RX ADMIN — GENTAMICIN SULFATE: 1 OINTMENT TOPICAL at 18:35

## 2021-06-07 RX ADMIN — ASPIRIN 81 MG: 81 TABLET, COATED ORAL at 05:27

## 2021-06-07 RX ADMIN — POTASSIUM CHLORIDE 10 MEQ: 7.46 INJECTION, SOLUTION INTRAVENOUS at 08:39

## 2021-06-07 RX ADMIN — PRASUGREL 10 MG: 10 TABLET, FILM COATED ORAL at 06:33

## 2021-06-07 RX ADMIN — HALOPERIDOL LACTATE 1 MG: 5 INJECTION, SOLUTION INTRAMUSCULAR at 16:25

## 2021-06-07 RX ADMIN — POTASSIUM CHLORIDE 40 MEQ: 1500 TABLET, EXTENDED RELEASE ORAL at 17:55

## 2021-06-07 RX ADMIN — RANOLAZINE 500 MG: 500 TABLET, FILM COATED, EXTENDED RELEASE ORAL at 08:44

## 2021-06-07 RX ADMIN — RANOLAZINE 500 MG: 500 TABLET, FILM COATED, EXTENDED RELEASE ORAL at 20:59

## 2021-06-07 RX ADMIN — Medication 1334 MG: at 12:05

## 2021-06-07 RX ADMIN — DOCUSATE SODIUM 50 MG AND SENNOSIDES 8.6 MG 1 TABLET: 8.6; 5 TABLET, FILM COATED ORAL at 05:28

## 2021-06-07 RX ADMIN — ONDANSETRON 4 MG: 2 INJECTION INTRAMUSCULAR; INTRAVENOUS at 12:11

## 2021-06-07 RX ADMIN — DOCUSATE SODIUM 50 MG AND SENNOSIDES 8.6 MG 1 TABLET: 8.6; 5 TABLET, FILM COATED ORAL at 17:56

## 2021-06-07 RX ADMIN — ISOSORBIDE DINITRATE 30 MG: 30 TABLET ORAL at 17:56

## 2021-06-07 RX ADMIN — LEVOTHYROXINE SODIUM 150 MCG: 0.15 TABLET ORAL at 05:28

## 2021-06-07 RX ADMIN — WARFARIN SODIUM 1.25 MG: 2.5 TABLET ORAL at 17:56

## 2021-06-07 RX ADMIN — OXYCODONE 5 MG: 5 TABLET ORAL at 20:53

## 2021-06-07 RX ADMIN — CARVEDILOL 6.25 MG: 6.25 TABLET, FILM COATED ORAL at 08:44

## 2021-06-07 RX ADMIN — METOCLOPRAMIDE 5 MG: 10 TABLET ORAL at 17:55

## 2021-06-07 RX ADMIN — METOCLOPRAMIDE 5 MG: 10 TABLET ORAL at 08:44

## 2021-06-07 ASSESSMENT — LIFESTYLE VARIABLES
EVER FELT BAD OR GUILTY ABOUT YOUR DRINKING: NO
HAVE YOU EVER FELT YOU SHOULD CUT DOWN ON YOUR DRINKING: NO
ALCOHOL_USE: NO
ON A TYPICAL DAY WHEN YOU DRINK ALCOHOL HOW MANY DRINKS DO YOU HAVE: 0
HAVE PEOPLE ANNOYED YOU BY CRITICIZING YOUR DRINKING: NO
TOTAL SCORE: 0
AVERAGE NUMBER OF DAYS PER WEEK YOU HAVE A DRINK CONTAINING ALCOHOL: 0
TOTAL SCORE: 0
DOES PATIENT WANT TO STOP DRINKING: NO
CONSUMPTION TOTAL: NEGATIVE
TOTAL SCORE: 0
EVER HAD A DRINK FIRST THING IN THE MORNING TO STEADY YOUR NERVES TO GET RID OF A HANGOVER: NO
HOW MANY TIMES IN THE PAST YEAR HAVE YOU HAD 5 OR MORE DRINKS IN A DAY: 0

## 2021-06-07 ASSESSMENT — ENCOUNTER SYMPTOMS
ORTHOPNEA: 0
WEAKNESS: 1
SHORTNESS OF BREATH: 0
DIARRHEA: 0
NECK PAIN: 0
VOMITING: 1
CHILLS: 0
HEARTBURN: 0
STRIDOR: 0
SORE THROAT: 0
FLANK PAIN: 0
WHEEZING: 0
MEMORY LOSS: 0
ABDOMINAL PAIN: 0
EYE PAIN: 0
HALLUCINATIONS: 0
PALPITATIONS: 0
DEPRESSION: 0
HEADACHES: 0
CONSTIPATION: 0
COUGH: 0
NAUSEA: 1
BRUISES/BLEEDS EASILY: 0
SINUS PAIN: 0
DIZZINESS: 0
SPUTUM PRODUCTION: 0
FEVER: 0
FOCAL WEAKNESS: 0
BLURRED VISION: 0
WEIGHT LOSS: 1
EYE REDNESS: 0
POLYDIPSIA: 0
BLOOD IN STOOL: 0
CONSTIPATION: 1
MYALGIAS: 0
EYE DISCHARGE: 0

## 2021-06-07 ASSESSMENT — PAIN DESCRIPTION - PAIN TYPE
TYPE: CHRONIC PAIN
TYPE: CHRONIC PAIN

## 2021-06-07 ASSESSMENT — PATIENT HEALTH QUESTIONNAIRE - PHQ9
1. LITTLE INTEREST OR PLEASURE IN DOING THINGS: NOT AT ALL
SUM OF ALL RESPONSES TO PHQ9 QUESTIONS 1 AND 2: 0
2. FEELING DOWN, DEPRESSED, IRRITABLE, OR HOPELESS: NOT AT ALL

## 2021-06-07 NOTE — CARE PLAN
The patient is Watcher - Medium risk of patient condition declining or worsening    Shift Goals  Clinical Goals: safety; overnight peritoneal dialysis  Patient Goals: rest    Progress made toward(s) clinical / shift goals:  universal and low fall risk precautions in place. Peritoneal dialysis treatment underway. Patient sleeping comfortably.    Patient is not progressing towards the following goals:    Patient did not eat meals, only snack. Did not have BM after suppository 6/6  Problem: Nutrition  Goal: Patient's nutritional and fluid intake will be adequate or improve  Outcome: Not Progressing     Problem: Bowel Elimination  Goal: Establish and maintain regular bowel function  Outcome: Not Progressing       Addendum: had small BM at 2230 on 6/6. Offered after-hours meal with IV Zophran to help patient tolerate, patient declined. Education rpovided.

## 2021-06-07 NOTE — PROGRESS NOTES
Aseptically connected to PD cycler @ 1715 w/o problem,initial drain = 157ml.PD exit site dressing changed CDI.Insevice and exchange number ([phone]) given to to Hillary Fleischer RN.Dr Cantrell notified of 3.2K this am,no new order, will check again in am per MD.

## 2021-06-07 NOTE — PROGRESS NOTES
Aseptically disconnected from CCPD,no machine problem overnight,tolerated tx.VSS,net UF = 461ml.PD ctah exit site dressing CDI,report given to Ioana Espinoza RN.

## 2021-06-07 NOTE — DISCHARGE PLANNING
Received Choice form at 0920  Agency/Facility Name: [facility]  Referral sent per Choice form @ 4301

## 2021-06-07 NOTE — DISCHARGE PLANNING
Outpatient Dialysis Note    Confirmed patient is active at:    [address] [address]    Schedule: PD    Patient is seen by Dr. Cantrell in HD clinic.    Spoke with Jovana at facility who confirmed.    Forwarded records for review.    Leanne Sahu- Dialysis Coordinator # [phone]  [facility]

## 2021-06-07 NOTE — PROGRESS NOTES
"[facility] -  PROGRESS NOTE               Author: EZEQUIEL Howard, CNN-NP  Collaborating Physician: Dr. Rivas  Date & Time: 6/7/2021  10:13 AM     HPI:  67 yo M PMH ESRD CCPD, HTN, anemia of CKD, CKD-MBD, HLD, and CAD who presents to ED with CC as above.  He has chronic intermittent N/V that he's been battling for a long time.  It leads to wide fluctuations in his po intake.  He also has severe CAD and non-cardiac atherosclerosis.  Recently he feel she has lost 45 lbs in the past month due to the N/V.  He's had a CTA in past that has revealed SMA steosis, but report was not available here at this institution.  Repeat imaging ordered by admit MD and he was admitted for further evaluation.  Has no abd pain and otherwise denies any associated F/C/CP/SOB.  No melena, hematochezia, hematemesis.  No HA, visual changes.    DAILY NEPHROLOGY SUMMARY:  6/05 - Consult done  6/06 - NAEO, +N/V; Unable to keep majority of food down  6/07 - sitting up in bed, still having poor intake vomited this am, PD UF: 461ml, had UGI: WNL, had large BM this am     REVIEW OF SYSTEMS:    10 point ROS reviewed and is as per HPI/daily summary or otherwise negative    PMH/PSH/SH/FH: Reviewed and unchanged since admission note  CURRENT MEDICATIONS: Reviewed from admission to present day    VS:  /54   Pulse 82   Temp 36.7 °C (98 °F) (Temporal)   Resp 16   Ht 1.778 m (5' 10\")   Wt 50.5 kg (111 lb 5.3 oz) Comment: 3.8 prosthetic/shoes subtracted  SpO2 99%   BMI 15.97 kg/m²   Physical Exam  Nursing note reviewed.   Constitutional:       Appearance: Normal appearance.   Neck:      Trachea: No tracheal tenderness.   Cardiovascular:      Rate and Rhythm: Normal rate.      Comments: No edema  Pulmonary:      Effort: Pulmonary effort is normal. No respiratory distress.   Abdominal:      General: There is no distension.   Musculoskeletal:         General: No deformity.   Skin:     Findings: No rash. "   Neurological:      Mental Status: He is alert and oriented to person, place, and time.      Motor: No atrophy.   Psychiatric:         Behavior: Behavior normal.     Fluids:  In: 401 [Dialysis:401]  Out: 100     LABS:  Recent Labs     06/05/21  0744 06/06/21  0730 06/07/21  0625   SODIUM 131* 133* 132*   POTASSIUM 3.7 3.2* 2.8*   CHLORIDE 89* 90* 89*   CO2 23 27 28   GLUCOSE 65 62* 70   BUN 72* 69* 59*   CREATININE 15.17* 14.22* 12.77*   CALCIUM 9.2 8.9 8.9       IMPRESSION:  # ESRD   - Etiology likely 2/2 HTN   - PD Rx: 9hrs tx time; 6 x 2.5L exchanges    * 2.5/1.5 % fluid; No last fill/no mid day exchange   - Maybe failing PD due to his GI issues, nutrition status is poor   - Discussed that possible conversion to iHD with IDPN maybe needed  # N/V   - Etiology unclear   - Chronic intermittent issue   - Followed by GI and has had extensive work up, upper GI: WNL    - Atherosclerosis of GI vasculature a possibility  # HTN   - Goal BP < 140/90   - Stable  # Anemia of CKD   - Goal Hgb 10-11   - Stable  # CKD-MBD   - PO4 pen   - Ca pen   - PTH pen   - Vit D pen  # Hypokalemia    - KCL IV x 1 today   - will start PO KCL 20meq daily   # Hyponatremia, mild   - UF with HD as tolerated      PLAN:  - Daily CCPD  - Tweak Rx of PD as needed  - No dietary protein restrictions  - Dose all meds per ESRD  - Regular diet ok  - No fluid restrictions at this time  - Nepro with meals  - MBD panel in AM  - start PO KCL daily

## 2021-06-07 NOTE — PROGRESS NOTES
[facility] Medicine Daily Progress Note    Date of Service  6/7/2021    Chief Complaint  66 y.o. male presented 6/4/2021 with N/V, unable to tolerate po and constipation    Hospital Course  Per Dr. Kaye's HPI:  66 y.o. M with ESRD on CCPD, CAD s/p multiple stents, valvular heart disease, HTN, HLD presented 6/4/2021 to [facility] with complaint of nausea, vomiting poor oral intake and weight loss of 45 pounds over the past month; then subsequently transferred to [facility] as a divert on the same day.  It was noted that he had CTA abdominal pelvis revealed SMA stenosis. He reported having history of C. difficile in April 2020, has not had diarrhea over the past month and reported to have negative C. difficile study a week ago. Upon admission, patient appeared comfortable with no deranged vital signs.  Abdominal exam is benign, doubt he has SMA stenosis given benign exam and on warfarin with therapeutic INR.  Additionally, he does take Effient in addition to warfarin for his vascular disease.  However, CTAP with contrast was ordered on admisison to ensure no SMA thrombosis.  Patient was admitted to medicine service for further evaluation and treatment.    6/5: vitals wnl; afebrile. No pain reported.   [facility] Nephrology Dr. Cantrell - knows this patient well. Peritoneal HD restarted.  During rounds, tolerated oral diet and reported feeling better. Refused suppository and wanted to try oral laxative first.   CT abd/pelvis with: the SMA and celiac axis are noted to be patent;  No acute abdominal or pelvic findings. CXR: no acute cardiopulm disease  US ABd: No sonographic signs of acute cholecystitis.    6/6: vitals and exam grossly unremarkable   At bedside, a bit discouraged. Lunch/dinner was late plus peritoneal HD was early. With all these changes in routine, he vomited lunch when Miralax was given. Current challenges with inability to tolerate po, N/V and severe constipation discussed - this has happened  multiple times. Previously resolved on his own. Early diet tray ordered. He agreed for suppository and UGI series done - grossly unremarkable.   CT chest - some nodules noted (inflammation, infection vs scarring) - advised to follow up.       Interval Problem Update  6/7  Vitals reviewed; afebrile.  The rest of the vitals within normal parameters.  Pain scale reported - none  Blood glucose in last 24hrs - around 70s  I/O - Still have poor oral intake; vomited early this AM; finally had a small BM 6/6 PM    At bedside, no acute complain but reports of still vomiting and inability to tolerate po. Had a bite of breakfast and nausea (despite having zofran prior to his meal).     We discussed about Abd/Pel CT and UGI series - no evidence of blockage at this point. Reglan has also been added for possible motility issue. He has been having small BM since yesterday. Additional meds like haldol prn available for N/V. Unclear at this point how PD and mental aspect are affecting his current issues with failure to thrive.     [facility] is [facility] - we discussed about possibly having GI team evaluate.     Labs reviewed  Na 132, Cr 12.7  K 2.8 (being supplemented)  WBC 7.4, Plt 151  INR 3.33    Echo: Severely reduced left ventricular systolic function. Left ventricular ejection fraction is visually estimated to be 30%. Severe mitral regurgitation. Moderate aortic insufficiency. RVSP 45    UGI series with KUB - unremarkable upper GI exam    Consultants/Specialty  Nephrology    Code Status  Full Code    Disposition  Likely home when medically clear (home health referral placed)    Discussed with patient, patient's nurse and with multidisciplinary team during rounds including , pharmacist and charge nurse.      I have performed the physical examination, and reviewed updated ROS and plan today 6/7/2021.  In review of yesterday's note, there are no new changes except as documented above or bolded  below.      Review of Systems  Review of Systems   Constitutional: Positive for weight loss. Negative for chills, fever and malaise/fatigue.   HENT: Negative for sore throat.    Eyes: Negative for blurred vision.   Respiratory: Negative for cough, sputum production and shortness of breath.    Cardiovascular: Negative for chest pain, palpitations, orthopnea and leg swelling.   Gastrointestinal: Positive for constipation, nausea and vomiting. Negative for abdominal pain and heartburn.   Genitourinary: Negative for dysuria, frequency and urgency.   Musculoskeletal: Negative for myalgias and neck pain.        Nerve pain in right leg   Neurological: Negative for dizziness, focal weakness and headaches.   Psychiatric/Behavioral: Negative for depression.        Physical Exam  Temp:  [35.9 °C (96.7 °F)-36.7 °C (98 °F)] 36.7 °C (98 °F)  Pulse:  [82-91] 82  Resp:  [16] 16  BP: (131-147)/(54-71) 133/54  SpO2:  [96 %-100 %] 99 %    Physical Exam  Vitals and nursing note reviewed.   Constitutional:       General: He is not in acute distress.     Appearance: Normal appearance. He is not ill-appearing.      Comments: Cachetic   HENT:      Head: Normocephalic and atraumatic.      Mouth/Throat:      Mouth: Mucous membranes are moist.      Pharynx: Oropharynx is clear.   Eyes:      General: No scleral icterus.     Conjunctiva/sclera: Conjunctivae normal.   Cardiovascular:      Rate and Rhythm: Normal rate and regular rhythm.      Pulses: Normal pulses.      Heart sounds: Murmur heard.     Pulmonary:      Effort: Pulmonary effort is normal. No respiratory distress.      Breath sounds: Normal breath sounds. No wheezing.   Abdominal:      General: Bowel sounds are normal. There is no distension.      Palpations: Abdomen is soft.      Tenderness: There is no abdominal tenderness.      Comments: Peritoneal catheter noted (C/D/I)   Musculoskeletal:         General: No swelling or tenderness. Normal range of motion.      Comments: Left BKA  stump - C/D/I   Skin:     General: Skin is warm and dry.      Capillary Refill: Capillary refill takes less than 2 seconds.   Neurological:      General: No focal deficit present.      Mental Status: He is alert and oriented to person, place, and time. Mental status is at baseline.   Psychiatric:         Mood and Affect: Mood normal.         Fluids    Intake/Output Summary (Last 24 hours) at 6/7/2021 1130  Last data filed at 6/7/2021 0700  Gross per 24 hour   Intake --   Output 561 ml   Net -561 ml       Laboratory  Recent Labs     06/05/21  0744 06/06/21  0730 06/07/21  0625   WBC 7.6 6.8 7.4   RBC 3.64* 3.42* 3.58*   HEMOGLOBIN 12.2* 11.3* 12.0*   HEMATOCRIT 37.5* 34.8* 36.5*   .0* 101.8* 102.0*   MCH 33.5* 33.0 33.5*   MCHC 32.5* 32.5* 32.9*   RDW 66.5* 63.2* 63.1*   PLATELETCT 155* 149* 151*   MPV 9.8 10.0 9.8     Recent Labs     06/05/21  0744 06/06/21  0730 06/07/21  0625   SODIUM 131* 133* 132*   POTASSIUM 3.7 3.2* 2.8*   CHLORIDE 89* 90* 89*   CO2 23 27 28   GLUCOSE 65 62* 70   BUN 72* 69* 59*   CREATININE 15.17* 14.22* 12.77*   CALCIUM 9.2 8.9 8.9     Recent Labs     06/05/21  0744 06/06/21  0730 06/07/21  0625   INR 2.31* 2.73* 3.33*               Imaging  DX-UPPER GI-SERIES WITH KUB   Final Result      1.  Unremarkable upper GI exam.         EC-ECHOCARDIOGRAM COMPLETE W/O CONT   Final Result      CT-CHEST (THORAX) W/O   Final Result      1.  Abnormal lung parenchyma      2.  Specifically, there are multifocal bilateral airspace process, 14 mm right middle lobe groundglass density, posterior aspect right lower lobe 12 mm area of spiculation and probably underlying emphysema.      3.  Pulmonary findings are nonspecific and could be inflammation, infection, or postinflammatory scarring. Neoplasm is less likely.      4.  Dense aortic and branch vessel atherosclerotic plaque      5.  Pulmonary artery hypertension      6.  Very small bilateral pleural effusion      [facility] pulmonary nod[facility]  recommendations:      Ground Glass and Part Solid: No routine follow-up      Comments: In certain suspicious nodules less than 6 mm, consider follow-up at 2 and 4 years. If solid component(s) or growth develops, consider resection.      Note: These recommendations do not apply to lung cancer screening, patients with immunosuppression, or patients with known primary cancer.      Fleischner Society 2017 Guidelines for Management of Incidentally Detected Pulmonary Nodules in Adults      OUTSIDE IMAGES-US ABDOMEN   Final Result      OUTSIDE IMAGES-DX CHEST   Final Result      CT-ABDOMEN-PELVIS WITH   Final Result   Addendum 1 of 1   On further review, the SMA and celiac axis are noted to be patent.    Contrast is difficult to confirm within the diminutive and markedly    atherosclerotic renal arteries.      Final      1.  No acute abdominal or pelvic findings.   2.  Bilaterally small atrophic appearing kidneys consistent with renal failure, with a dialysis catheter noted in the pelvic cavity.   3.  Colonic diverticulosis without diverticulitis.           Assessment/Plan  * Failure to thrive in adult  Assessment & Plan  Reported weight loss of 45 pounds over the past few months due to poor oral intake  CT abd/pelvis with no evidence of mass or tumor  CT chest - noted nodules (will need to follow up outpatient); may be a candidate for possible outpatient colonoscopy   RD consult  Current challenges with inability to tolerate po, N/V and severe constipation    We discussed about Abd/Pel CT and UGI series - no evidence of blockage at this point. Reglan has also been added for possible motility issue. He has been having small BM since yesterday. Additional meds like haldol prn available for N/V.     Unclear at this point how PD and mental aspect are affecting his current issues with failure to thrive.     [facility] is [facility] to [facility] - we discussed about possibly having GI team evaluate.             Systolic CHF, chronic  ([facility])- (present on admission)  Assessment & Plan  No clinic signs of fluid overload or decompensation  C/w home regimen    ACP (advance care planning)  Assessment & Plan  Plan of care and rationale for hospitalization discussed on admission  FULL code status confirmed      Anxiety  Assessment & Plan  PRN Xanax    PVD (peripheral vascular disease) ([facility])  Assessment & Plan  S/p left BKA  Former smoker  H/o vein graft  Cont Warfarin/Effient/statin    Severe protein-calorie malnutrition ([facility])  Assessment & Plan  PO intake as tolerated  Ensure  RD consult    Superior mesenteric artery stenosis ([facility])  Assessment & Plan  Transferred for concern for SMA thrombosis -- reported to have +finding on CT AP previously - however, no report seen at [facility] Arrival    CT AP at [facility]: no SMA thrombosis  Doubtful given therapeutic INR while on warfarin and benign abdominal exam    Non-rheumatic mitral regurgitation- (present on admission)  Assessment & Plan  Severe with low EF  No clinical signs of fluid overload  Following Cardiology outpatient    CAD (coronary artery disease)- (present on admission)  Assessment & Plan  Warfarin/Effient/BB/statin  Isodil  Ranexa for angina    Essential hypertension- (present on admission)  Assessment & Plan  Coreg    Anemia in chronic kidney disease (CKD)- (present on admission)  Assessment & Plan  Chronic, no gross bleeding    C. difficile colitis  Assessment & Plan  H/o  Reported having negative result a week prior  No WBC, Cr elevated due to ESRD  No toxic swetha colon seen on CT AP  No diarrhea so far    ESRD on peritoneal dialysis ([facility])- (present on admission)  Assessment & Plan  Continue PD  Continue phosphate binders  Nephrology consulted for assistance with care    Intractable nausea and vomiting  Assessment & Plan  Antiemetic  Supportive care  No acute etiology seen on CTAP  Could be back up from constipation - has now started to have some BM    See above    Status post below knee amputation  of left lower extremity- (present on admission)  Assessment & Plan  S/p left BKA  PT/OT    Mixed hyperlipidemia- (present on admission)  Assessment & Plan  Statin    Hypothyroid- (present on admission)  Assessment & Plan  Synthroid       VTE prophylaxis: on coumadin (INR being followed closely)

## 2021-06-07 NOTE — CARE PLAN
Problem: Fall Risk  Goal: Patient will remain free from falls  Outcome: Progressing   The patient is Stable - Low risk of patient condition declining or worsening

## 2021-06-07 NOTE — THERAPY
"Speech Language Pathology   Initial Assessment     Patient Name: Francis Purvis  AGE:  66 y.o., SEX:  male  Medical Record #: [id number]  Today's Date: 6/7/2021     Precautions: Fall Risk  Comments: old L BKA with prosthetic    Assessment    Patient is 66 y.o. male admitted with n/v, weight loss and weakness. Complicated PMH including ESRD on PD, CAD, L BKA, cdiff. CT of abdomen 6/4: \"the visualized lung bases are clear. is marked generalized atherosclerotic calcification throughout the abdomen. An aortobifemoral graft is in place, which appears patent. The bowel is unremarkable. A few scattered colonic diverticula are present however, with no diverticulitis...\"  Upper GI on 6/6 was unremarkable.      Patient seen for clinical swallow evaluation this date.  He was just seen by SURESH Granda for GI, and plan is for EGD tomorrow.  Pt was found sitting at a 60* angle eating lunch.  Education provided regarding optimal positioning to maximize esophageal motility and allow gravity to assist with bolus transit.  Pt in agreement and sat up at 90* angle.  Patient reports that he has had a 36# weight loss in the last month and a half.  He denies any coughing or choking with eating and denies any globus sensation or pain when swallowing. He indicated that he will vomit 3-4 times per day, and there is no rhyme or reason as to when it happens.  He said that he will sometimes wake up and will get nauseated and then will vomit before he can eat something.  He also stated that sometimes he will vomit during or following meals.  It is not food specific.  He stated he does get post nasal drip that will sometimes make him gag, and than can also trigger emesis.  Presentation of PO consisted of thin liquids, purees, minced and moist, soft and bite sized and regular consistencies.  Only minimal amounts of each were consumed by patient as he was not really hungry and needed to pace himself due to nausea.  He was able to swallow " "all consistencies without any overt S/Sx of aspiration noted.  He did have an episode of throat clearing, but reported that was due to his post nasal drip.      Presently patient is presenting with functional oral musculature for regular diet. After discussion with GI, there is concern for possible esophageal dysphagia vs gastric issue such as delayed gastric emptying.  Will await EGD results and will follow 1 more time as appropriate  Ok for regular diet with thin liquids as tolerated (RG7/TN0).       Plan    Regular diet with thin liquids (RG7/TN0)    90* angle for all PO intake and for 30 minutes following PO.    Recommend Speech Therapy 2 times per week for 2 visits for the following treatments:  Dysphagia Training.  Anticipate most likely will only need 1 more visit.     Discharge Recommendations: Anticipate that the patient will have no further speech therapy needs after discharge from the hospital     Objective       06/07/21 1245   Vitals   O2 (LPM) 0   O2 Delivery Device None - Room Air   Pain 0 - 10 Group   Therapist Pain Assessment Nurse Notified;Post Activity Pain Same as Prior to Activity   Prior Living Situation   Prior Services Intermittent Physical Support for ADL Per Family   Bathroom Set up Walk In Shower   Equipment Owned [employer] ([employer] Crutches   Lives with - Patient's Self Care Capacity Spouse   Comments per PT's notes: \"pts wife assists some with showering and IADLs. She is willing and able to assist as needed at dc.\"   Prior Level Of Function   Communication Within Functional Limits   Swallow Within Functional Limits  (no coughing or choking: emesis x 6 weeks)   Dentition Intact   Dentures None   Hearing Within Functional Limits for Evaluation   Hearing Aid None   Vision Wears Corrective Lenses   Patient's Primary Language English   Education Some High School   Education Years Completed   (11 1/2 years)   Occupation (Pre-Hospital Vocational) Retired Due To Age  (worked on the [employer]- )   Oral Motor Eval    Is Patient Able to Complete Oral Motor Eval Yes but Impaired   Labial Function   Labial Structure At Rest Within Functional Limits   Labial Vowel Production / I /, / U / Within Functional Limits   Labial Sequence / I /, / U / Within Functional Limits   Frown, Pucker Within Functional Limits   Lingual Function   Lingual Structure At Rest Within Functional Limits   Lingual Protrude Within Functional Limits   Lingual Retract Within Functional Limits   Elevate In Mouth Within Functional Limits   Elevate Outside Mouth Within Functional Limits   Lateralization No Impairment Right;No Impairment Left   Lick Lips (Circular) Within Functional Limits   Lick Palate No Impairment   / Pa / 5X's Within Functional Limits   / Ta / 5X's Within Functional Limits   / Ka / 5X's Within Functional Limits   / [place] / 5X's Within Functional Limits   Jaw   Jaw Structure At Rest Within Functional Limits   Bite (Masseter) Within Functional Limits   Chew (Rotary) Within Functional Limits   Jaw Open / Resist Within Functional Limits   Jaw Close / Resist Within Functional Limits   Velar Function   Velar Structure At Rest Within Functional Limits   / A / Prolonged Within Functional Limits   / A /  5X's Within Functional Limits   Gag / Palatal Reflex Not Tested   Laryngeal Function   Voice Quality Within Functional Limits   Volutional Cough Within Functional Limits   Excursion Upon Swallow Complete   Max Phonation Time (Seconds) 10+ seconds   Oral Food Presentation   Ice Chips Within Functional Limits   Single Swallow Thin (0) Within Functional Limits   Serial Swallow Thin (0) Within Functional Limits   Pureed (4) Within Functional Limits   Minced & Moist (5) - (Dysphagia II) Within Functional Limits   Soft & Bite-Sized (6) - (Dysphagia III) Within Functional Limits   Regular (7) Within Functional Limits   Self Feeding Independent   Tracheostomy   Tracheostomy  No   Dysphagia Strategies /  Recommendations   Strategies / Interventions Recommended (Yes / No) Yes   Compensatory Strategies Head of Bed 90 Degrees During Eating / Drinking;Head of Bed 45 Degrees After Meals   Diet / Liquid Recommendation Regular (7);Thin (0)  (as tolerated)   Medication Administration    (as tolerated)   Therapy Interventions   (follow up x1)   Follow Up SLP Evaluation SLP to follow up following EGD   Dysphagia Rating   Nutritional Liquid Intake Rating Scale Non thickened beverages   Nutritional Food Intake Rating Scale Total oral diet with no restrictions   Short Term Goals   Short Term Goal # 1 Pt will be able to consume reg/thin liquids with no overt S/Sx of aspiration when independently implementing safe swallow precautions.    Short Term Goal # 2 Pt will be able to verbalize understanding of current swallowing status and aspiration S/Sx with 100% accuracy following education from SLP   Problem List   Problem List   (suspect possible esophageal dysphagia )   Anticipated Discharge Needs   Discharge Recommendations Anticipate that the patient will have no further speech therapy needs after discharge from the hospital   Therapy Recommendations Upon DC Not Indicated

## 2021-06-07 NOTE — DISCHARGE PLANNING
Anticipated Discharge Disposition: HH    Action: Lsw spoke with pt at bedside to get choice for HH. Pt stated that he had HH in the past but was unsure of the agency. Pt is agreeable to [facility] as they are contracted with his insurance.     Barriers to Discharge: HH acceptance    Plan: Lsw to f/u with referral

## 2021-06-07 NOTE — PROGRESS NOTES
0736 Notified MD Junior about patient's AM Potassium of 2.8  IV Potassium Chloride ordered per MAR    0900 Patient is not tolerating IV Potassium. This RN notified Dr. Junior.  Per Dr. Junior OK to not administer next two scheduled IV Potassium orders.

## 2021-06-07 NOTE — CONSULTS
Gastroenterology ([facility]) Initial Consult Note               Author:  EZEQUIEL Granda Date & Time Created: 6/7/2021 1:19 PM       Patient ID:  Name:             Francis Purvis  YOB: 1955  Age:                 66 y.o.  male  MRN:               [id number]      Referring Provider:  Demetrius Junior MD    Presenting Chief Complaint:  Nausea, Vomiting      History of Present Illness:    He is a 66-year-old male patient seen in consultation for nausea, vomiting, weight loss.  The patient was admitted on 6/4/2021 with complaints of nausea and vomiting with poor oral intake and weight loss.  The patient states that over the last 4 to 6 weeks he has had problems with intermittent nausea, vomiting that can occur randomly.  He states that if he vomits, often it will appear liquid and bilious in color.  However, at times he can regurgitate liquid or food from his stomach.  He cannot accurately tell me how often this occurs despite several questions asked in different ways about his vomiting.  It does sound like at times he can have food that sticks in his midesophagus when swallowing that he can regurgitate such as solid food, but this is not consistent.  He does not seem to have problems with liquids or the swallowing mechanism.  He states he is lost 35 to 45 pounds over the last month to month and a half.  He denies abdominal pain, fevers, chills, melena, hematochezia.  He denies GERD or heartburn symptoms.  The patient had similar symptoms of nausea and vomiting 2019 seen by Dr. Butch Hunter with [facility].  EGD performed by Dr. Scott Arciniega at that time demonstrated hiatal hernia and no other abnormalities.  Prior to that the patient did have what sounds to be bleeding esophageal and gastric ulcers, although I do not have primary data of this.  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  He also underwent previous left below the knee amputation related to his vascular disease.  He also at one point had imaging summary that suggested possible superior mesenteric artery stenosis, but the patient tells me that vascular surgery saw him and felt that he did not have this condition and no intervention was done at that time.    Labs and imaging reviewed.  CBC consistent with RBC 12.0, hematocrit 22.0, RDW 63.1, platelets 151.  Sed rate is normal 6/4/2021.  CMP with sodium 132, potassium 2.8, chloride 89, creatinine 12.70.  LFT 6/4/2021 are normal.  INR today 3.33  on Coumadin.  CT abdomen and pelvis with contrast on 6/4/2021 demonstrated no acute abdominal or pelvic findings.  SMA and celiac axis noted to be patent, although contrast is difficult to confirm within the diminutive and markedly atherosclerotic renal arteries.  Upper GI series with KUB demonstrated normal-appearing esophagus without stricture, esophagitis, or reflux.  Stomach and duodenum also appeared normal.    Denied further modifying factors, associated symptoms or timing issues.        Review of Systems:  Review of Systems   Constitutional: Positive for malaise/fatigue and weight loss. Negative for chills and fever.   HENT: Negative for sinus pain and sore throat.    Eyes: Negative for pain, discharge and redness.   Respiratory: Negative for cough, shortness of breath, wheezing and stridor.    Cardiovascular: Negative for chest pain and leg swelling.   Gastrointestinal: Positive for nausea and vomiting. Negative for abdominal pain, blood in stool, constipation, diarrhea, heartburn and melena.   Genitourinary: Negative for flank pain and frequency.   Musculoskeletal: Positive for joint pain. Negative for myalgias.   Skin: Negative for itching and rash.   Neurological: Positive for weakness. Negative for dizziness and headaches.   Endo/Heme/Allergies: Negative for polydipsia. Does not bruise/bleed easily.    Psychiatric/Behavioral: Negative for hallucinations and memory loss.   All other systems reviewed and are negative.            Past Medical History:  Past Medical History:   Diagnosis Date   • Arterial thrombosis (HCC)     recurrent   • Arterial vascular disease     result of chemotherapy   • Colonic polyp    • DDD (degenerative disc disease), lumbosacral 2018   • Degenerative joint disease (DJD) of hip 2018   • Familial hyperlipidemia    • Hypogonadism male    • Hypothyroid    • S/P below knee amputation (HCC)     left   • Testicular cancer (HCC)      Active Hospital Problems    Diagnosis    • Systolic CHF, chronic (HCC) [I50.22]    • Superior mesenteric artery stenosis (HCC) [K55.1]    • Failure to thrive in adult [R62.7]    • Severe protein-calorie malnutrition (HCC) [E43]    • PVD (peripheral vascular disease) ([facility]) [I73.9]    • Anxiety [F41.9]    • ACP (advance care planning) [Z71.89]    • Non-rheumatic mitral regurgitation [I34.0]    • Hypothyroid [E03.9]    • CAD (coronary artery disease) [I25.10]    • Mixed hyperlipidemia [E78.2]    • Essential hypertension [I10]    • Anemia in chronic kidney disease (CKD) [N18.9, D63.1]    • ESRD on peritoneal dialysis (HCC) [N18.6, Z99.2]    • C. difficile colitis [A04.72]    • Intractable nausea and vomiting [R11.2]    • Atherosclerosis of right lower extremity with rest pain ([facility]) [I70.221]    • Status post below knee amputation of left lower extremity [Z89.512]    • Testicular cancer (HCC) [C62.90]          Past Surgical History:  Past Surgical History:   Procedure Laterality Date   • AMPUTATION, BELOW THE KNEE     • ORCHIECTOMY BILATERAL     • TONSILLECTOMY                 Hospital Medications:  Current Facility-Administered Medications   Medication Dose Frequency Provider Last Rate Last Admin   • [START ON 6/8/2021] potassium chloride SA (Kdur) tablet 20 mEq  20 mEq DAILY LOAN HowardPCalvinRGUMARO       • [START ON 6/8/2021] warfarin (COUMADIN) tablet 2.5 mg  2.5 mg  DAILY AT 1800 Demetrius Junior M.D.       • warfarin (COUMADIN) tablet 1.25 mg  1.25 mg ONCE AT 1800 Demetrius Junior M.D.       • bisacodyl (DULCOLAX) suppository 10 mg  10 mg DAILY Demetrius Junior M.D.   10 mg at 06/07/21 0527   • metoclopramide (REGLAN) tablet 5 mg  5 mg 4X/DAY ACHS Demetrius Junior M.D.   5 mg at 06/07/21 1205   • gentamicin (GARAMYCIN) 0.1 % ointment   DIALYSIS PRN Skinny Cantrell M.D.   Given at 06/06/21 1719   • senna-docusate (PERICOLACE or SENOKOT S) 8.6-50 MG per tablet 1 tablet  1 tablet BID Demetrius Junior M.D.   1 tablet at 06/07/21 0528   • polyethylene glycol/lytes (MIRALAX) PACKET 1 Packet  1 Packet DAILY Demetrius Junior M.D.   1 Packet at 06/05/21 1853   • omeprazole (PRILOSEC) capsule 20 mg  20 mg DAILY Demetrius Junior M.D.   20 mg at 06/07/21 0528   • lactated ringers infusion (BOLUS)  500 mL Once PRN Reyes Kaye M.D.       • acetaminophen (Tylenol) tablet 650 mg  650 mg Q6HRS PRN Reyes Kaye M.D.       • enalaprilat (VASOTEC) injection 1.25 mg  1.25 mg Q6HRS PRN Reyes Kaye M.D.       • labetalol (NORMODYNE/TRANDATE) injection 10 mg  10 mg Q4HRS PRN Reyes Kaye M.D.       • ondansetron (ZOFRAN) syringe/vial injection 4 mg  4 mg Q4HRS PRN Reyes Kaye M.D.   4 mg at 06/07/21 1211   • ondansetron (ZOFRAN ODT) dispertab 4 mg  4 mg Q4HRS PRN Reyes Kaye M.D.       • guaiFENesin dextromethorphan (ROBITUSSIN DM) 100-10 MG/5ML syrup 10 mL  10 mL Q6HRS PRN Reyes Chaung, M.D.       • albuterol inhaler 1-2 Puff  1-2 Puff Q6HRS PRN Reyes Kaye M.D.       • ascorbic acid (Vitamin C) tablet 500 mg  500 mg DAILY Reyes Kaye M.D.   500 mg at 06/07/21 0528   • aspirin EC (ECOTRIN) tablet 81 mg  81 mg DAILY Reyes Kaye M.D.   81 mg at 06/07/21 0527   • atorvastatin (LIPITOR) tablet 40 mg  40 mg Nightly Reyes Kyae M.D.   40 mg at 06/06/21 2128   • calcium acetate (PHOS-LO) 667 MG tablet 1,334 mg  1,334 mg 4X/DAY WITH MEALS + NIGHTLY Reyes Kaye M.D.   1,334 mg at 06/07/21 1205   • carvedilol (COREG) tablet  6.25 mg  6.25 mg BID WITH MEALS Reyes Kaye M.D.   6.25 mg at 06/07/21 0844   • cyanocobalamin (VITAMIN B-12) tablet 500 mcg  500 mcg DAILY Reyes Kaye M.D.   500 mcg at 06/07/21 0528   • fluticasone (FLONASE) nasal spray 100 mcg  2 Spray DAILY Reyes Kaye M.D.       • isosorbide dinitrate (ISORDIL) tablet 30 mg  30 mg BID Reyes Kaye M.D.   30 mg at 06/07/21 0527   • levothyroxine (SYNTHROID) tablet 150 mcg  150 mcg QAM Reyes Kaye M.D.   150 mcg at 06/07/21 0528   • loratadine (CLARITIN) tablet 10 mg  10 mg DAILY Reyes Kaye M.D.   10 mg at 06/05/21 0536   • prasugrel (EFFIENT) tablet 10 mg  10 mg DAILY Reyes Kaye M.D.   10 mg at 06/07/21 0633   • ranolazine (RANEXA) 500 MG SR tablet TB12 500 mg  500 mg BID Reyes Kaye M.D.   500 mg at 06/07/21 0844   • vitamin D (cholecalciferol) tablet 2,000 Units  2,000 Units DAILY Reyes Kaye M.D.   2,000 Units at 06/07/21 0528   • MD Alert...Warfarin per Pharmacy   PHARMACY TO DOSE Reyes Kaye M.D.       • haloperidol lactate (HALDOL) injection 1 mg  1 mg Q4HRS PRN Reyes Kaye M.D.       • oxyCODONE immediate-release (ROXICODONE) tablet 5 mg  5 mg HS PRN Scott Ghosh M.D.   5 mg at 06/06/21 1845   • ALPRAZolam (XANAX) tablet 0.25 mg  0.25 mg HS PRN Scott Ghosh M.D.   0.25 mg at 06/06/21 1845   Last reviewed on 6/4/2021  5:17 PM by Hillary A Fleischer, R.N.        Current Outpatient Medications:  Facility-Administered Medications Prior to Admission   Medication Dose Route Frequency Provider Last Rate Last Admin   • testosterone cypionate (DEPO-TESTOSTERONE) injection 200 mg  200 mg Intramuscular Q30 DAYS Kali Liang M.D.   200 mg at 04/01/21 1412     Medications Prior to Admission   Medication Sig Dispense Refill Last Dose   • carvedilol (COREG) 6.25 MG Tab TAKE 1 TABLET BY MOUTH TWICE A DAY   6/3/2021 at Unknown time   • warfarin (COUMADIN) 2.5 MG Tab Take 1 tablet by mouth every day. 90 tablet 3 6/3/2021 at 2000   • ondansetron (ZOFRAN  ODT) 4 MG TABLET DISPERSIBLE One tablet under the tongue every six hours 30 tablet 2 6/4/2021 at Unknown time   • aspirin EC (ECOTRIN) 81 MG Tablet Delayed Response Take 81 mg by mouth every day.   6/3/2021 at 2000   • ranolazine (RANEXA) 500 MG TABLET SR 12 HR Take 500 mg by mouth 2 times a day.   6/4/2021 at 0800   • Dexlansoprazole 60 MG CAPSULE DELAYED RELEASE delayed-release capsule Take 60 mg by mouth 2 times a day.   5/20/2021   • nitroglycerin (NITROSTAT) 0.4 MG SL Tab Place 0.4 mg under the tongue.   5/3/2021   • isosorbide dinitrate (ISORDIL) 30 MG Tab Take 1 tablet by mouth 2 times a day. 180 tablet 3 6/4/2021 at Unknown time   • ALPRAZolam (XANAX) 0.5 MG Tab take 1 tablet by mouth at bedtime if needed for sleep or anxiety 30 tablet 0 6/3/2021 at 2000   • Ferrous Sulfate 50 MG Tab CR Take 1 tablet by mouth every day. (Patient not taking: Reported on 6/3/2021)      • prasugrel (EFFIENT) 10 MG Tab Take 10 mg by mouth every day.   6/3/2021 at Unknown time   • oxyCODONE immediate-release (ROXICODONE) 5 MG Tab Take 5 mg by mouth.   6/3/2021 at 2000   • albuterol 108 (90 Base) MCG/ACT Aero Soln inhalation aerosol inhale 1 to 2 puffs by mouth every 6 hours if needed for shortness of breath 8.5 g 3 5/4/2021   • levothyroxine (SYNTHROID) 150 MCG Tab TAKE 1 TABLET BY MOUTH EVERY MORNING ON AN EMPTY STOMACH. 100 Tab 3 6/4/2021 at 0600   • atorvastatin (LIPITOR) 40 MG Tab Take 1 Tab by mouth every day. 100 Tab 3 6/3/2021 at 2000   • Calcium Acetate, Phos Binder, 667 MG Cap take 2 capsules by mouth four times a day WITH MEALS AND SNACKS  1 5/13/2021   • vitamin D (CHOLECALCIFEROL) 1000 UNIT Tab Take 2,000 Units by mouth every day.   6/3/2021 at Unknown time   • VITAMIN E PO Take  by mouth. Dose unknown   6/3/2021 at Unknown time   • ascorbic acid (ASCORBIC ACID) 500 MG Tab Take 500 mg by mouth every day.   6/3/2021 at 2000   • cyanocobalamin (VITAMIN B-12) 500 MCG Tab Take 500 mcg by mouth every day.   6/4/2021 at  Unknown time   • loratadine (CLARITIN) 10 MG Tab Take 1 Tab by mouth every day. 30 Tab 3 2021   • fluticasone (FLONASE) 50 MCG/ACT nasal spray Spray 2 Sprays in nose every day. Each Nostril 16 g 11 6/3/2021 at Unknown time         Medication Allergies:  Allergies   Allergen Reactions   • Amoxicillin-Pot Clavulanate Unspecified     body cramps  Body cramps   • Augmentin Unspecified     Body cramps   • Codeine Unspecified     HA   • Promethazine Unspecified     muscle spasms         Family Medical History:  Family History   Problem Relation Age of Onset   • Heart Failure Father    • Heart Failure Mother    • Osteoporosis Mother    • Osteoporosis Sister    • Osteoporosis Sister    • Osteoporosis Sister    • Heart Attack Other    • Cancer Other          Social History:  Social History     Socioeconomic History   • Marital status:      Spouse name: Not on file   • Number of children: 2   • Years of education: Not on file   • Highest education level: Not on file   Occupational History   • Occupation: retired     Employer: retired   Tobacco Use   • Smoking status: Former Smoker     Packs/day: 1.00     Types: Cigarettes     Quit date: 1994     Years since quittin.0   • Smokeless tobacco: Never Used   Vaping Use   • Vaping Use: Never used   Substance and Sexual Activity   • Alcohol use: Not Currently     Alcohol/week: 1.5 oz     Types: 3 Cans of beer per week     Comment: occassional use   • Drug use: No   • Sexual activity: Yes   Other Topics Concern   • Not on file   Social History Narrative   • Not on file     Social Determinants of Health     Financial Resource Strain:    • Difficulty of Paying Living Expenses:    Food Insecurity:    • Worried About Running Out of Food in the Last Year:    • Ran Out of Food in the Last Year:    Transportation Needs:    • Lack of Transportation (Medical):    • Lack of Transportation (Non-Medical):    Physical Activity:    • Days of Exercise per Week:    • Minutes of  "Exercise per Session:    Stress:    • Feeling of Stress :    Social Connections:    • Frequency of Communication with Friends and Family:    • Frequency of Social Gatherings with Friends and Family:    • Attends Shinto Services:    • Active Member of Clubs or Organizations:    • Attends Club or Organization Meetings:    • Marital Status:    Intimate Partner Violence:    • Fear of Current or Ex-Partner:    • Emotionally Abused:    • Physically Abused:    • Sexually Abused:          Vital signs:  Weight/BMI: Body mass index is 15.97 kg/m².  /54   Pulse 82   Temp 36.7 °C (98 °F) (Temporal)   Resp 16   Ht 1.778 m (5' 10\")   Wt 50.5 kg (111 lb 5.3 oz)   SpO2 99%   Vitals:    06/06/21 2152 06/07/21 0150 06/07/21 0523 06/07/21 0800   BP: 138/65 142/65 147/65 133/54   Pulse: 86 85 87 82   Resp: 16 16 16 16   Temp: 36.2 °C (97.1 °F) 35.9 °C (96.7 °F) 36.5 °C (97.7 °F) 36.7 °C (98 °F)   TempSrc: Temporal Temporal Temporal Temporal   SpO2: 100% 100% 100% 99%   Weight:       Height:         Oxygen Therapy:  Pulse Oximetry: 99 %, O2 (LPM): (P) 0, O2 Delivery Device: (P) None - Room Air    Intake/Output Summary (Last 24 hours) at 6/7/2021 1319  Last data filed at 6/7/2021 0700  Gross per 24 hour   Intake --   Output 561 ml   Net -561 ml         Physical Exam:  Physical Exam  Vitals and nursing note reviewed.   Constitutional:       General: He is not in acute distress.     Appearance: Normal appearance. He is ill-appearing. He is not toxic-appearing or diaphoretic.   HENT:      Head: Normocephalic and atraumatic.      Right Ear: External ear normal.      Left Ear: External ear normal.      Nose: Nose normal.      Mouth/Throat:      Mouth: Mucous membranes are dry.      Pharynx: Oropharynx is clear.   Eyes:      General: No scleral icterus.     Extraocular Movements: Extraocular movements intact.      Pupils: Pupils are equal, round, and reactive to light.   Cardiovascular:      Rate and Rhythm: Normal rate and " regular rhythm.      Pulses: Normal pulses.      Heart sounds: Murmur (Mitral, systolic murmur 3/6) heard.     Pulmonary:      Effort: Pulmonary effort is normal.      Breath sounds: Normal breath sounds. No wheezing or rales.   Abdominal:      General: Abdomen is flat. Bowel sounds are normal.      Palpations: Abdomen is soft.      Tenderness: There is no abdominal tenderness.      Comments: Peritoneal dialysis catheter   Musculoskeletal:         General: No swelling or tenderness.      Cervical back: Neck supple.      Comments: Left BKA   Skin:     General: Skin is warm and dry.      Comments: Extensive sun-damaged skin   Neurological:      General: No focal deficit present.      Mental Status: He is alert and oriented to person, place, and time.   Psychiatric:         Thought Content: Thought content normal.         Judgment: Judgment normal.             Labs:  Recent Labs     06/05/21 0744 06/06/21  0730 06/07/21  0625   SODIUM 131* 133* 132*   POTASSIUM 3.7 3.2* 2.8*   CHLORIDE 89* 90* 89*   CO2 23 27 28   BUN 72* 69* 59*   CREATININE 15.17* 14.22* 12.77*   MAGNESIUM 2.8*  --   --    PHOSPHORUS 7.7* 6.4*  --    CALCIUM 9.2 8.9 8.9     Recent Labs     06/05/21  0744 06/06/21  0730 06/07/21  0625   GLUCOSE 65 62* 70     Recent Labs     06/05/21  0744 06/06/21  0730 06/07/21  0625   WBC 7.6 6.8 7.4   NEUTSPOLYS 73.90* 72.80* 74.10*   LYMPHOCYTES 17.40* 14.30* 13.30*   MONOCYTES 4.40 10.40 9.40   EOSINOPHILS 1.70 1.80 2.30   BASOPHILS 2.60* 0.30 0.50     Recent Labs     06/05/21  0744 06/06/21  0730 06/07/21  0625   RBC 3.64* 3.42* 3.58*   HEMOGLOBIN 12.2* 11.3* 12.0*   HEMATOCRIT 37.5* 34.8* 36.5*   PLATELETCT 155* 149* 151*   PROTHROMBTM 26.1* 29.8* 34.8*   INR 2.31* 2.73* 3.33*     Recent Results (from the past 24 hour(s))   Prothrombin Time    Collection Time: 06/07/21  6:25 AM   Result Value Ref Range    PT 34.8 (H) 12.0 - 14.6 sec    INR 3.33 (H) 0.87 - 1.13   CBC WITH DIFFERENTIAL    Collection Time:  06/07/21  6:25 AM   Result Value Ref Range    WBC 7.4 4.8 - 10.8 K/uL    RBC 3.58 (L) 4.70 - 6.10 M/uL    Hemoglobin 12.0 (L) 14.0 - 18.0 g/dL    Hematocrit 36.5 (L) 42.0 - 52.0 %    .0 (H) 81.4 - 97.8 fL    MCH 33.5 (H) 27.0 - 33.0 pg    MCHC 32.9 (L) 33.7 - 35.3 g/dL    RDW 63.1 (H) 35.9 - 50.0 fL    Platelet Count 151 (L) 164 - 446 K/uL    MPV 9.8 9.0 - 12.9 fL    Neutrophils-Polys 74.10 (H) 44.00 - 72.00 %    Lymphocytes 13.30 (L) 22.00 - 41.00 %    Monocytes 9.40 0.00 - 13.40 %    Eosinophils 2.30 0.00 - 6.90 %    Basophils 0.50 0.00 - 1.80 %    Immature Granulocytes 0.40 0.00 - 0.90 %    Nucleated RBC 0.00 /100 WBC    Neutrophils (Absolute) 5.45 1.82 - 7.42 K/uL    Lymphs (Absolute) 0.98 (L) 1.00 - 4.80 K/uL    Monos (Absolute) 0.69 0.00 - 0.85 K/uL    Eos (Absolute) 0.17 0.00 - 0.51 K/uL    Baso (Absolute) 0.04 0.00 - 0.12 K/uL    Immature Granulocytes (abs) 0.03 0.00 - 0.11 K/uL    NRBC (Absolute) 0.00 K/uL   Basic Metabolic Panel    Collection Time: 06/07/21  6:25 AM   Result Value Ref Range    Sodium 132 (L) 135 - 145 mmol/L    Potassium 2.8 (L) 3.6 - 5.5 mmol/L    Chloride 89 (L) 96 - 112 mmol/L    Co2 28 20 - 33 mmol/L    Glucose 70 65 - 99 mg/dL    Bun 59 (H) 8 - 22 mg/dL    Creatinine 12.77 (HH) 0.50 - 1.40 mg/dL    Calcium 8.9 8.5 - 10.5 mg/dL    Anion Gap 15.0 7.0 - 16.0   ESTIMATED GFR    Collection Time: 06/07/21  6:25 AM   Result Value Ref Range    GFR If African American 5 (A) >60 mL/min/1.73 m 2    GFR If Non African American 4 (A) >60 mL/min/1.73 m 2         Radiology Review:  DX-UPPER GI-SERIES WITH KUB   Final Result      1.  Unremarkable upper GI exam.         EC-ECHOCARDIOGRAM COMPLETE W/O CONT   Final Result      CT-CHEST (THORAX) W/O   Final Result      1.  Abnormal lung parenchyma      2.  Specifically, there are multifocal bilateral airspace process, 14 mm right middle lobe groundglass density, posterior aspect right lower lobe 12 mm area of spiculation and probably underlying  emphysema.      3.  Pulmonary findings are nonspecific and could be inflammation, infection, or postinflammatory scarring. Neoplasm is less likely.      4.  Dense aortic and branch vessel atherosclerotic plaque      5.  Pulmonary artery hypertension      6.  Very small bilateral pleural effusion      Fleischner Society pulmonary nodule recommendations:      Ground Glass and Part Solid: No routine follow-up      Comments: In certain suspicious nodules less than 6 mm, consider follow-up at 2 and 4 years. If solid component(s) or growth develops, consider resection.      Note: These recommendations do not apply to lung cancer screening, patients with immunosuppression, or patients with known primary cancer.      Fleischner Society 2017 Guidelines for Management of Incidentally Detected Pulmonary Nodules in Adults      OUTSIDE IMAGES-US ABDOMEN   Final Result      OUTSIDE IMAGES-DX CHEST   Final Result      CT-ABDOMEN-PELVIS WITH   Final Result   Addendum 1 of 1   On further review, the SMA and celiac axis are noted to be patent.    Contrast is difficult to confirm within the diminutive and markedly    atherosclerotic renal arteries.      Final      1.  No acute abdominal or pelvic findings.   2.  Bilaterally small atrophic appearing kidneys consistent with renal failure, with a dialysis catheter noted in the pelvic cavity.   3.  Colonic diverticulosis without diverticulitis.            MDM (Data Review):   -Records reviewed and summarized in current documentation  -I personally reviewed and interpreted the laboratory results  -I personally reviewed the radiology images      Medical Decision Making, by Problem:  Active Hospital Problems    Diagnosis    • Systolic CHF, chronic (HCC) [I50.22]    • Superior mesenteric artery stenosis (HCC) [K55.1]    • Failure to thrive in adult [R62.7]    • Severe protein-calorie malnutrition (HCC) [E43]    • PVD (peripheral vascular disease) (HCC) [I73.9]    • Anxiety [F41.9]    • ACP  (advance care planning) [Z71.89]    • Non-rheumatic mitral regurgitation [I34.0]    • Hypothyroid [E03.9]    • CAD (coronary artery disease) [I25.10]    • Mixed hyperlipidemia [E78.2]    • Essential hypertension [I10]    • Anemia in chronic kidney disease (CKD) [N18.9, D63.1]    • ESRD on peritoneal dialysis (HCC) [N18.6, Z99.2]    • C. difficile colitis [A04.72]    • Intractable nausea and vomiting [R11.2]    • Atherosclerosis of right lower extremity with rest pain (HCC) [I70.221]    • Status post below knee amputation of left lower extremity [Z89.512]    • Testicular cancer (HCC) [C62.90]            Assessment/Recommendations:  Problems:  1.  Nausea and vomiting, possible esophageal dysmotility/dysphagia but UGI showed no stricture  2.  Weight loss  3.  Protein calorie malnutrition  4.  Peripheral vascular disease status post previous bypass surgery and left below the knee amputation currently on Coumadin  5.  End-stage renal disease on peritoneal dialysis  6.  History of possible superior mesenteric artery stenosis-no current CT evidence of this  7.  Hyperlipidemia  8.  Hypothyroidism  9.  Coronary artery disease  10.  Macrocytic anemia in chronic kidney disease  11.  COVID negative on 6/4  12.  Complex patient with multiple medical comorbidities with increased complexity medical decision making    Plan:  1.  Esophagogastroduodenoscopy with biopsies and/or other endotherapy scheduled tomorrow with Dr. Antonio Calixto around 3 PM.Patient seen and examined before proceeding.    Patient seen and examined.  Risks, benefits, and alternatives of aforementioned procedures were discussed with patient in detail.  Patient was given opportunities to ask questions and discuss other options.  Risks including but not limited to perforation, infection, bleeding (higher risk with blood-thinners), missed lesion(s), possible need for surgery(ies) and/or interventional radiology, possible need for repeat procedure(s) and/or additional  testing, hospitalization possibly prolonged, cardiac and/or pulmonary event, aspiration, hypoxia, stroke, medication (s) and/or anesthesia reaction(s), indefinite diagnosis, discomfort/pain, unsuccessful and/or incomplete procedure, ineffective therapy, persistent symptoms, damage to adjacent organs/structure and/or vascular, and other adverse event(s) possibly life-threatening.  Interactive discussion was undertaken with Layman's terms.  I answered questions in full and to satisfaction. Patient stated understanding and acceptance of these risks, and wished to proceed.   Informed consent was given in clear state of mind.  2.  N.p.o. at midnight    3.  Antiemetics per primary team    4.  Hold anticoagulation.  Had a discussion with Dr. Calixto and the patient both about the risk and benefits of reversing Coumadin.  No indications for dilation at this time given no evidence of stricture or stenosis on EGD and biopsies are low risk for bleeding.    5.  If EGD is normal, consider gastric emptying study.  In addition, consider alternate causes of nausea, vomiting, weight loss including adrenal insufficiency and/or hyperthyroidism.  Also can consider outpatient motility testing of the esophagus if no other causes found.    6. Will need close follow-up with established GI Deidra Madrigal or Dr. Hunter as an outpatient.    ----  Dear Dr. Demetrius Junior,  Thank you for asking us to see your patient in consultation.  Please refer to my consult note as per above for details and recommendations.     EZEQUIEL Granda MD, [facility], FACG    Core Quality Measures   Reviewed items::  Labs, Medications and Radiology reports reviewed

## 2021-06-07 NOTE — FACE TO FACE
Face to Face Supporting Documentation - Home Health    The encounter with this patient was in whole or in part the primary reason for home health admission.    Date of encounter:   Patient:                    MRN:                       YOB: 2022  Francis Purvis  [id number]  1955     Home health to see patient for:  Skilled Nursing care for assessment, interventions & education, Physical Therapy evaluation and treatment and Occupational therapy evaluation and treatment    Skilled need for:  Recent Deterioration of Health Status Nausea/vomitting with unable to tolerate oral intake with ESRD on PD, CAD s/p stents, hx of GIB    Skilled nursing interventions to include:  Comment: Continued home physical and occupational therapy    Homebound status evidenced by:  Needs the assistance of another person in order to leave the home. Leaving home requires a considerable and taxing effort. There is a normal inability to leave the home.    Community Physician to provide follow up care: Kali Liang M.D.     Optional Interventions? No      I certify the face to face encounter for this home health care referral meets the CMS requirements and the encounter/clinical assessment with the patient was, in whole, or in part, for the medical condition(s) listed above, which is the primary reason for home health care. Based on my clinical findings: the service(s) are medically necessary, support the need for home health care, and the homebound criteria are met.  I certify that this patient has had a face to face encounter by myself.  Demetrius Junior M.D. - NPI: [id number]     Report given to day shift.cp

## 2021-06-08 ENCOUNTER — ANESTHESIA EVENT (OUTPATIENT)
Dept: SURGERY | Facility: MEDICAL CENTER | Age: 66
DRG: 291 | End: 2021-06-08
Payer: MEDICARE

## 2021-06-08 ENCOUNTER — ANESTHESIA (OUTPATIENT)
Dept: SURGERY | Facility: MEDICAL CENTER | Age: 66
DRG: 291 | End: 2021-06-08
Payer: MEDICARE

## 2021-06-08 LAB
ALBUMIN SERPL BCP-MCNC: 3.2 G/DL (ref 3.2–4.9)
ALBUMIN/GLOB SERPL: 1.3 G/DL
ALP SERPL-CCNC: 103 U/L (ref 30–99)
ALT SERPL-CCNC: 12 U/L (ref 2–50)
ANION GAP SERPL CALC-SCNC: 17 MMOL/L (ref 7–16)
AST SERPL-CCNC: 24 U/L (ref 12–45)
BILIRUB SERPL-MCNC: 0.7 MG/DL (ref 0.1–1.5)
BUN SERPL-MCNC: 53 MG/DL (ref 8–22)
CALCIUM SERPL-MCNC: 9 MG/DL (ref 8.5–10.5)
CHLORIDE SERPL-SCNC: 91 MMOL/L (ref 96–112)
CO2 SERPL-SCNC: 26 MMOL/L (ref 20–33)
CREAT SERPL-MCNC: 11.92 MG/DL (ref 0.5–1.4)
GLOBULIN SER CALC-MCNC: 2.5 G/DL (ref 1.9–3.5)
GLUCOSE SERPL-MCNC: 103 MG/DL (ref 65–99)
INR PPP: 3.31 (ref 0.87–1.13)
PATHOLOGY CONSULT NOTE: NORMAL
POTASSIUM SERPL-SCNC: 3.6 MMOL/L (ref 3.6–5.5)
PROT SERPL-MCNC: 5.7 G/DL (ref 6–8.2)
PROTHROMBIN TIME: 34.7 SEC (ref 12–14.6)
SODIUM SERPL-SCNC: 134 MMOL/L (ref 135–145)

## 2021-06-08 PROCEDURE — 160002 HCHG RECOVERY MINUTES (STAT): Performed by: INTERNAL MEDICINE

## 2021-06-08 PROCEDURE — 0DB98ZX EXCISION OF DUODENUM, VIA NATURAL OR ARTIFICIAL OPENING ENDOSCOPIC, DIAGNOSTIC: ICD-10-PCS | Performed by: INTERNAL MEDICINE

## 2021-06-08 PROCEDURE — 85610 PROTHROMBIN TIME: CPT

## 2021-06-08 PROCEDURE — 90945 DIALYSIS ONE EVALUATION: CPT

## 2021-06-08 PROCEDURE — 80053 COMPREHEN METABOLIC PANEL: CPT

## 2021-06-08 PROCEDURE — 501629 HCHG TUBE, LUKI TRAP STERILE DISP: Performed by: INTERNAL MEDICINE

## 2021-06-08 PROCEDURE — 700102 HCHG RX REV CODE 250 W/ 637 OVERRIDE(OP): Performed by: NURSE PRACTITIONER

## 2021-06-08 PROCEDURE — 99232 SBSQ HOSP IP/OBS MODERATE 35: CPT | Performed by: HOSPITALIST

## 2021-06-08 PROCEDURE — 160203 HCHG ENDO MINUTES - 1ST 30 MINS LEVEL 4: Performed by: INTERNAL MEDICINE

## 2021-06-08 PROCEDURE — 0DB68ZX EXCISION OF STOMACH, VIA NATURAL OR ARTIFICIAL OPENING ENDOSCOPIC, DIAGNOSTIC: ICD-10-PCS | Performed by: INTERNAL MEDICINE

## 2021-06-08 PROCEDURE — 3E1M39Z IRRIGATION OF PERITONEAL CAVITY USING DIALYSATE, PERCUTANEOUS APPROACH: ICD-10-PCS | Performed by: INTERNAL MEDICINE

## 2021-06-08 PROCEDURE — 88305 TISSUE EXAM BY PATHOLOGIST: CPT

## 2021-06-08 PROCEDURE — 700105 HCHG RX REV CODE 258: Performed by: ANESTHESIOLOGY

## 2021-06-08 PROCEDURE — 700101 HCHG RX REV CODE 250: Performed by: ANESTHESIOLOGY

## 2021-06-08 PROCEDURE — 700111 HCHG RX REV CODE 636 W/ 250 OVERRIDE (IP): Performed by: ANESTHESIOLOGY

## 2021-06-08 PROCEDURE — A9270 NON-COVERED ITEM OR SERVICE: HCPCS | Performed by: INTERNAL MEDICINE

## 2021-06-08 PROCEDURE — 700102 HCHG RX REV CODE 250 W/ 637 OVERRIDE(OP): Performed by: STUDENT IN AN ORGANIZED HEALTH CARE EDUCATION/TRAINING PROGRAM

## 2021-06-08 PROCEDURE — 88312 SPECIAL STAINS GROUP 1: CPT

## 2021-06-08 PROCEDURE — 97535 SELF CARE MNGMENT TRAINING: CPT | Mod: CQ

## 2021-06-08 PROCEDURE — 160009 HCHG ANES TIME/MIN: Performed by: INTERNAL MEDICINE

## 2021-06-08 PROCEDURE — A9270 NON-COVERED ITEM OR SERVICE: HCPCS | Performed by: STUDENT IN AN ORGANIZED HEALTH CARE EDUCATION/TRAINING PROGRAM

## 2021-06-08 PROCEDURE — 160048 HCHG OR STATISTICAL LEVEL 1-5: Performed by: INTERNAL MEDICINE

## 2021-06-08 PROCEDURE — 160035 HCHG PACU - 1ST 60 MINS PHASE I: Performed by: INTERNAL MEDICINE

## 2021-06-08 PROCEDURE — 160046 HCHG PACU - 1ST 60 MINS PHASE II: Performed by: INTERNAL MEDICINE

## 2021-06-08 PROCEDURE — 36415 COLL VENOUS BLD VENIPUNCTURE: CPT

## 2021-06-08 PROCEDURE — 770001 HCHG ROOM/CARE - MED/SURG/GYN PRIV*

## 2021-06-08 PROCEDURE — A9270 NON-COVERED ITEM OR SERVICE: HCPCS | Performed by: NURSE PRACTITIONER

## 2021-06-08 PROCEDURE — 700102 HCHG RX REV CODE 250 W/ 637 OVERRIDE(OP): Performed by: INTERNAL MEDICINE

## 2021-06-08 RX ORDER — DIPHENHYDRAMINE HYDROCHLORIDE 50 MG/ML
12.5 INJECTION INTRAMUSCULAR; INTRAVENOUS
Status: DISCONTINUED | OUTPATIENT
Start: 2021-06-08 | End: 2021-06-08 | Stop reason: HOSPADM

## 2021-06-08 RX ORDER — HALOPERIDOL 5 MG/ML
1 INJECTION INTRAMUSCULAR
Status: DISCONTINUED | OUTPATIENT
Start: 2021-06-08 | End: 2021-06-08 | Stop reason: HOSPADM

## 2021-06-08 RX ORDER — SODIUM CHLORIDE, SODIUM LACTATE, POTASSIUM CHLORIDE, CALCIUM CHLORIDE 600; 310; 30; 20 MG/100ML; MG/100ML; MG/100ML; MG/100ML
INJECTION, SOLUTION INTRAVENOUS CONTINUOUS
Status: DISCONTINUED | OUTPATIENT
Start: 2021-06-08 | End: 2021-06-08 | Stop reason: HOSPADM

## 2021-06-08 RX ORDER — OMEPRAZOLE 20 MG/1
40 CAPSULE, DELAYED RELEASE ORAL DAILY
Status: DISCONTINUED | OUTPATIENT
Start: 2021-06-09 | End: 2021-06-13

## 2021-06-08 RX ORDER — ONDANSETRON 2 MG/ML
4 INJECTION INTRAMUSCULAR; INTRAVENOUS
Status: DISCONTINUED | OUTPATIENT
Start: 2021-06-08 | End: 2021-06-08 | Stop reason: HOSPADM

## 2021-06-08 RX ORDER — SODIUM CHLORIDE 9 MG/ML
INJECTION, SOLUTION INTRAVENOUS
Status: DISCONTINUED | OUTPATIENT
Start: 2021-06-08 | End: 2021-06-08 | Stop reason: SURG

## 2021-06-08 RX ADMIN — ALPRAZOLAM 0.25 MG: 0.25 TABLET ORAL at 20:55

## 2021-06-08 RX ADMIN — METOCLOPRAMIDE 5 MG: 10 TABLET ORAL at 20:55

## 2021-06-08 RX ADMIN — GENTAMICIN SULFATE: 1 OINTMENT TOPICAL at 18:47

## 2021-06-08 RX ADMIN — EPHEDRINE SULFATE 10 MG: 50 INJECTION INTRAMUSCULAR; INTRAVENOUS; SUBCUTANEOUS at 12:27

## 2021-06-08 RX ADMIN — RANOLAZINE 500 MG: 500 TABLET, FILM COATED, EXTENDED RELEASE ORAL at 08:47

## 2021-06-08 RX ADMIN — OXYCODONE 5 MG: 5 TABLET ORAL at 20:55

## 2021-06-08 RX ADMIN — DOCUSATE SODIUM 50 MG AND SENNOSIDES 8.6 MG 1 TABLET: 8.6; 5 TABLET, FILM COATED ORAL at 17:13

## 2021-06-08 RX ADMIN — ISOSORBIDE DINITRATE 30 MG: 30 TABLET ORAL at 17:13

## 2021-06-08 RX ADMIN — LEVOTHYROXINE SODIUM 150 MCG: 0.15 TABLET ORAL at 05:10

## 2021-06-08 RX ADMIN — METOCLOPRAMIDE 5 MG: 10 TABLET ORAL at 17:12

## 2021-06-08 RX ADMIN — POTASSIUM CHLORIDE 20 MEQ: 1500 TABLET, EXTENDED RELEASE ORAL at 05:10

## 2021-06-08 RX ADMIN — RANOLAZINE 500 MG: 500 TABLET, FILM COATED, EXTENDED RELEASE ORAL at 20:05

## 2021-06-08 RX ADMIN — PROPOFOL 50 MG: 10 INJECTION, EMULSION INTRAVENOUS at 12:25

## 2021-06-08 RX ADMIN — ATORVASTATIN CALCIUM 40 MG: 40 TABLET, FILM COATED ORAL at 20:55

## 2021-06-08 RX ADMIN — CARVEDILOL 6.25 MG: 6.25 TABLET, FILM COATED ORAL at 17:13

## 2021-06-08 RX ADMIN — OMEPRAZOLE 20 MG: 20 CAPSULE, DELAYED RELEASE ORAL at 05:10

## 2021-06-08 RX ADMIN — SODIUM CHLORIDE: 9 INJECTION, SOLUTION INTRAVENOUS at 12:15

## 2021-06-08 ASSESSMENT — ENCOUNTER SYMPTOMS
WEIGHT LOSS: 1
DIZZINESS: 0
DEPRESSION: 0
CHILLS: 0
VOMITING: 0
FOCAL WEAKNESS: 0
SORE THROAT: 0
ORTHOPNEA: 0
SPUTUM PRODUCTION: 0
FEVER: 0
BLURRED VISION: 0
NECK PAIN: 0
COUGH: 0
SHORTNESS OF BREATH: 0
NAUSEA: 0
HEARTBURN: 0
HEADACHES: 0
ABDOMINAL PAIN: 0
MYALGIAS: 0
CONSTIPATION: 0
PALPITATIONS: 0

## 2021-06-08 ASSESSMENT — PAIN DESCRIPTION - PAIN TYPE
TYPE: ACUTE PAIN
TYPE: SURGICAL PAIN

## 2021-06-08 NOTE — OR NURSING
1235 Pt to PACU 8  from OR.  Bedside report from anesthesiologist and RN.  Attached to monitoring, VSS, breathing is calm and unlabored.     Alert and oriented. No complaints of pain or discomfort. Called report to RN. No further questions at this time. Ready for transfer. Requested transport.

## 2021-06-08 NOTE — DIETARY
"Nutrition services: Day 4 of admit.  Francis Purvis is a 66 y.o. male with admitting DX of N/V with poor PO and wt loss.  Consult received for malnutrition admit screen score 4 for poor PO and wt loss.    Assessment:  Height: 177.8 cm (5' 10\")  Weight: 50.5 kg (111 lb 5.3 oz) (3.8 prosthetic/shoes subtracted)  Body mass index is 15.97 kg/m²., BMI classification: Underweight  Diet/Intake: Cardiac with supplements ordered by MD (6/4)    Evaluation:   1. PMHx: ESRD on PD, colonic polyp, degenerative disc disease, BKA.  2. Per nephrology, no dietary protein restrictions, regular diet ok, hold phos binders, continue KCl daily.  3. RD visited pt at bedside s/p EGD w/ snare polypectomy. Per gastroenterology note, post-procedure dx of acute superficial gastritis w/o hemorrhage, benign gastric polyp. Pt seems hopeful to eat; lunch present at bedside. Pt unsure about appetite. RD recommended slow PO intake, lower fat and cold foods initially considering context of N/V. No N/V/abdominal pain at present.  4. Reports decreasing PO last 1.5 months with N/V/abdominal pain to the point that hearing his wife talk about food triggered vomiting. States food smells good and tastes good, but doesn't stay down. Pt estimates ~1 month with negligible PO.   5. Per pt, lost 36 lb in 1.5 months; states wt of 114 lb PTA. Weighed 150 lbs beginning of May per pt testimony; confirmed via chart review. Wt this admit via stand up scale 111.3 lbs. This is a severe weight loss of 25.8% wt loss in 1.5 months. Pt has been doing peritoneal dialysis >1 yr; fluid fluctuations with PD treatments may have influenced wts taken, but not to such a significant extent as 25.8%.  6. NFPE: severe muscle wasting (temporal); moderate muscle wasting (clavicle, deltoid); moderate fat loss (buccal, triceps).  7. Labs: BUN 53, Crea 11.92, GFR 4. 6/6 phos 6.4  8. MAR: vit C 500 mg QD, dulcolax, Vitamin B12, abx, synthyroid, reglan, prilosec, zofran, Kdur, vit D " 2000 IU, phosLo (last admin 6/7, discontinued)  9. Last BM: 6/7  10. PO: 0% x2, 50-75% x1, % x2    11. Malnutrition Risk: Severe malnutrition in the context of acute illness related to nausea, vomiting, and abdominal pain of unknown etiology as evidenced by PO <50% of normal for ~1 month per pt, severe wt loss of 25.8% in 1.5 months; severe muscle wasting (temporal); moderate muscle wasting (clavicle, deltoid); and moderate fat loss (buccal, triceps).    Recommendations/Plan:  1. Continue boost glucose control as ordered by MD.  2. Follow for PO tolerance.   3. Encourage intake of meals and supplements.  4. Document intake of all meals and supplements as % taken in ADL's to provide interdisciplinary communication across all shifts.   5. Monitor weight.  6. Nutrition rep will continue to see patient for ongoing meal and snack preferences.     RD following.

## 2021-06-08 NOTE — PROGRESS NOTES
"[facility] -  PROGRESS NOTE               Author: EZEQUIEL Howard, CNN-NP  Collaborating Physician: Dr. Rivas  Date & Time: 6/8/2021  10:21 AM     HPI:  67 yo M PMH ESRD CCPD, HTN, anemia of CKD, CKD-MBD, HLD, and CAD who presents to ED with CC as above.  He has chronic intermittent N/V that he's been battling for a long time.  It leads to wide fluctuations in his po intake.  He also has severe CAD and non-cardiac atherosclerosis.  Recently he feel she has lost 45 lbs in the past month due to the N/V.  He's had a CTA in past that has revealed SMA steosis, but report was not available here at this institution.  Repeat imaging ordered by admit MD and he was admitted for further evaluation.  Has no abd pain and otherwise denies any associated F/C/CP/SOB.  No melena, hematochezia, hematemesis.  No HA, visual changes.    DAILY NEPHROLOGY SUMMARY:  6/05 - Consult done  6/06 - NAEO, +N/V; Unable to keep majority of food down  6/07 - sitting up in bed, still having poor intake vomited this am, PD UF: 461ml, had UGI: WNL, had large BM this am   6/08: sitting up in bed, feels miserable, weak and nauseated, for EGD today, discussed case with Dr. Rivas, dont believe PD is contributing factor as pt BUN is only mildly elevated and pt has been on PD for > 1 year, if GI workup neg could consider transitioning to HD to see if helps elevate symptoms, tolerated PD with UF: 450ml        REVIEW OF SYSTEMS:    10 point ROS reviewed and is as per HPI/daily summary or otherwise negative    PMH/PSH/SH/FH: Reviewed and unchanged since admission note  CURRENT MEDICATIONS: Reviewed from admission to present day    VS:  /73   Pulse 86   Temp 36.1 °C (97 °F) (Temporal)   Resp 17   Ht 1.778 m (5' 10\")   Wt 50.5 kg (111 lb 5.3 oz) Comment: 3.8 prosthetic/shoes subtracted  SpO2 95%   BMI 15.97 kg/m²   Physical Exam  Nursing note reviewed.   Constitutional:       Appearance: Normal appearance.   Neck: "      Trachea: No tracheal tenderness.   Cardiovascular:      Rate and Rhythm: Normal rate.      Comments: No edema  Pulmonary:      Effort: Pulmonary effort is normal. No respiratory distress.   Abdominal:      General: There is no distension.   Musculoskeletal:         General: No deformity.   Skin:     Findings: No rash.   Neurological:      Mental Status: He is alert and oriented to person, place, and time.      Motor: No atrophy.   Psychiatric:         Behavior: Behavior normal.     Fluids:  In: 300 [P.O.:300]  Out: 941     LABS:  Recent Labs     06/06/21  0730 06/07/21  0625 06/08/21  0550   SODIUM 133* 132* 134*   POTASSIUM 3.2* 2.8* 3.6   CHLORIDE 90* 89* 91*   CO2 27 28 26   GLUCOSE 62* 70 103*   BUN 69* 59* 53*   CREATININE 14.22* 12.77* 11.92*   CALCIUM 8.9 8.9 9.0       IMPRESSION:  # ESRD   - Etiology likely 2/2 HTN   - PD Rx: 9hrs tx time; 6 x 2.5L exchanges    * 2.5/1.5 % fluid; No last fill/no mid day exchange   - Maybe failing PD due to his GI issues, nutrition status is poor   - discussed case with Dr. Rivas, dont believe PD is contributing factor as pt BUN is only mildly elevated  and pt has been on PD for > 1 year, if GI workup neg could consider transitioning to HD to see if helps  eleviate symptoms  # N/V   - Etiology unclear   - Chronic intermittent issue   - Followed by GI and has had extensive work up, upper GI: WNL , for EGD today   - Atherosclerosis of GI vasculature a possibility  # HTN, controlled    - Goal BP < 140/90   - Stable  # Anemia of CKD   - Goal Hgb 10-11   - Stable  # CKD-MBD   - managed at outpt clinic    - hold phos binder for now with N/V  # Hypokalemia, improved    - KCL IV x 1 6/6   - continue PO KCL 20meq daily   # Hyponatremia, mild   - UF with HD as tolerated      PLAN:  - Daily CCPD  - Tweak Rx of PD as needed  - No dietary protein restrictions  - Dose all meds per ESRD  - Regular diet ok  - No fluid restrictions at this time  - Nepro with meals  - hold phos binders    - continue PO KCL daily

## 2021-06-08 NOTE — CARE PLAN
The patient is Stable - Low risk of patient condition declining or worsening    Shift Goals  Clinical Goals: nutrition; peritoneal dialysis  Patient Goals: rest    Progress made toward(s) clinical / shift goals:  Denies pain at this time; called for assistance when needed.    Patient is not progressing towards the following goals:      Problem: Fall Risk  Goal: Patient will remain free from falls  Outcome: Progressing     Problem: Pain - Standard  Goal: Alleviation of pain or a reduction in pain to the patient’s comfort goal  Outcome: Progressing

## 2021-06-08 NOTE — PROGRESS NOTES
CCPD treatment aseptically disconnected at 0605.Effluent clear,yellow,no fibrin.Net  ml./69.PD cath dressing change due tonight.Report given to primary Rn.

## 2021-06-08 NOTE — OR NURSING
Dr. Calixto and Dr. Alba notified of INR 3.31, MDs agreeable to proceed with procedure at this time.

## 2021-06-08 NOTE — ANESTHESIA PREPROCEDURE EVALUATION
Relevant Problems   CARDIAC   (positive) Abdominal aortic ectasia (HCC)   (positive) Atherosclerosis of native coronary artery without angina pectoris   (positive) Atherosclerosis of right lower extremity with rest pain (HCC)   (positive) CAD (coronary artery disease)   (positive) Essential hypertension   (positive) Heart attack (HCC)   (positive) Non-rheumatic mitral regurgitation   (positive) Occlusion and stenosis of bilateral carotid arteries   (positive) Superior mesenteric artery stenosis (HCC)      GI   (positive) Ulcer of esophagus         (positive) Dependence on renal dialysis (HCC)   (positive) ESRD on dialysis (HCC)   (positive) ESRD on peritoneal dialysis (HCC)      ENDO   (positive) Hypothyroid       Physical Exam    Airway   Mallampati: II  TM distance: >3 FB  Neck ROM: full       Cardiovascular - normal exam  Rhythm: regular  Rate: normal  (-) murmur     Dental - normal exam           Pulmonary - normal exam  Breath sounds clear to auscultation     Abdominal    Neurological - normal exam                 Anesthesia Plan    ASA 4   ASA physical status 4 criteria: severe valve dysfunction and severe reduction of ejection fractions    Plan - general and MAC       Airway plan will be natural airway          Induction: intravenous      Pertinent diagnostic labs and testing reviewed    Informed Consent:    Anesthetic plan and risks discussed with patient.    Use of blood products discussed with: patient whom consented to blood products.

## 2021-06-08 NOTE — OR NURSING
Dr. Young, anesthesia, and Dr. Calixto both made aware of INR of 3.31. Both agreeable to proceed with EGD despite these results.

## 2021-06-08 NOTE — CARE PLAN
The patient is Stable - Low risk of patient condition declining or worsening    Shift Goals  Clinical Goals: Safety, no N/V  Patient Goals: No N/V    Progress made toward(s) clinical / shift goals:  Safety    Patient is not progressing towards the following goals: N/V      Problem: Knowledge Deficit - Standard  Goal: Patient and family/care givers will demonstrate understanding of plan of care, disease process/condition, diagnostic tests and medications  Outcome: Progressing  Note: Patient able to communicate needs effectively. Plan of care updated to patient and on whiteboard. All questions answered at this time.      Problem: Fall Risk  Goal: Patient will remain free from falls  Outcome: Progressing  Note: Non-slip socks are on, bed is locked and in lowest position, personal belongings are within reach, room is free of clutter and spills, call light is in place. Patient educated on how to use the call light and how to call for assistance. Patient verbalized understanding.

## 2021-06-08 NOTE — PROGRESS NOTES
[facility] Progress Note    This RN was called at 0230 am due to PD machine alarm,     Arrived at  0345 and it was due to one dialysate bag is not connected to the cycler. Reconnected the bag, and treatment resumed. Gave report to primary RN.      Call [facility]/On Call at [phone] for further assistance.

## 2021-06-08 NOTE — THERAPY
"Missed Therapy     Patient Name: Francis Purvis  Age:  66 y.o., Sex:  male  Medical Record #: [id number]  Today's Date: 6/8/2021    Discussed missed therapy with RN    Attempted to see pt for therapy. Pt stating high \"loopiness\" from a combination of dialysis and being NPO. Pt stating he didn't feel comfortable w/ getting OOB at this time as he didn't want to fall. Pt stating that he has been getting up to use the BSC and that his goal is to work back towards increased ambulation tolerance. Pt expressing motivation to participate in therapy after procedure. Will continue to follow and re-attempt as able.  "

## 2021-06-08 NOTE — ANESTHESIA TIME REPORT
Anesthesia Start and Stop Event Times     Date Time Event    6/8/2021 1211 Ready for Procedure     1215 Anesthesia Start     1236 Anesthesia Stop        Responsible Staff  06/08/21    Name Role Begin End    Pola Alba M.D. Anesth 1215 1236        Preop Diagnosis (Free Text):  Pre-op Diagnosis     Hematemesis, nausea vomiting        Preop Diagnosis (Codes):    Post op Diagnosis  Vomiting      Premium Reason  Non-Premium    Comments:

## 2021-06-08 NOTE — PROGRESS NOTES
[facility] Medicine Daily Progress Note    Date of Service  6/8/2021    Chief Complaint  66 y.o. male presented 6/4/2021 with N/V, unable to tolerate po and constipation    Hospital Course  Per Dr. Kaye's HPI:  66 y.o. M with ESRD on CCPD, CAD s/p multiple stents, valvular heart disease, HTN, HLD presented 6/4/2021 to [facility] with complaint of nausea, vomiting poor oral intake and weight loss of 45 pounds over the past month; then subsequently transferred to [facility] as a divert on the same day.  It was noted that he had CTA abdominal pelvis revealed SMA stenosis. He reported having history of C. difficile in April 2020, has not had diarrhea over the past month and reported to have negative C. difficile study a week ago. Upon admission, patient appeared comfortable with no deranged vital signs.  Abdominal exam is benign, doubt he has SMA stenosis given benign exam and on warfarin with therapeutic INR.  Additionally, he does take Effient in addition to warfarin for his vascular disease.  However, CTAP with contrast was ordered on admisison to ensure no SMA thrombosis.  Patient was admitted to medicine service for further evaluation and treatment.    6/5: vitals wnl; afebrile. No pain reported.   [facility] Nephrology Dr. Cantrell - knows this patient well. Peritoneal HD restarted.  During rounds, tolerated oral diet and reported feeling better. Refused suppository and wanted to try oral laxative first.   CT abd/pelvis with: the SMA and celiac axis are noted to be patent;  No acute abdominal or pelvic findings. CXR: no acute cardiopulm disease  US ABd: No sonographic signs of acute cholecystitis.    6/6: vitals and exam grossly unremarkable   At bedside, a bit discouraged. Lunch/dinner was late plus peritoneal HD was early. With all these changes in routine, he vomited lunch when Miralax was given. Current challenges with inability to tolerate po, N/V and severe constipation discussed - this has happened  multiple times. Previously resolved on his own. Early diet tray ordered. He agreed for suppository and UGI series done - grossly unremarkable.   CT chest - some nodules noted (inflammation, infection vs scarring) - advised to follow up.       Interval Problem Update  6/7  Vitals reviewed; afebrile.  The rest of the vitals within normal parameters.  Pain scale reported - none  Blood glucose in last 24hrs - around 70s  I/O - Still have poor oral intake; vomited early this AM; finally had a small BM 6/6 PM    At bedside, no acute complain but reports of still vomiting and inability to tolerate po. Had a bite of breakfast and nausea (despite having zofran prior to his meal).     We discussed about Abd/Pel CT and UGI series - no evidence of blockage at this point. Reglan has also been added for possible motility issue. He has been having small BM since yesterday. Additional meds like haldol prn available for N/V. Unclear at this point how PD and mental aspect are affecting his current issues with failure to thrive.     [facility] is known to [facility] - we discussed about possibly having GI team evaluate.     Labs reviewed  Na 132, Cr 12.7  K 2.8 (being supplemented)  WBC 7.4, Plt 151  INR 3.33    Echo: Severely reduced left ventricular systolic function. Left ventricular ejection fraction is visually estimated to be 30%. Severe mitral regurgitation. Moderate aortic insufficiency. RVSP 45    UGI series with KUB - unremarkable upper GI exam    6/8 patient is resting in bed, he feels weak, he is n.p.o. for EGD today, denies nausea or vomiting.    Consultants/Specialty  Nephrology  GI    Code Status  Full Code    Disposition  To be determined      Review of Systems  Review of Systems   Constitutional: Positive for weight loss. Negative for chills, fever and malaise/fatigue.   HENT: Negative for sore throat.    Eyes: Negative for blurred vision.   Respiratory: Negative for cough, sputum production and shortness of breath.     Cardiovascular: Negative for chest pain, palpitations, orthopnea and leg swelling.   Gastrointestinal: Negative for abdominal pain, constipation, heartburn, nausea and vomiting.   Genitourinary: Negative for dysuria, frequency and urgency.   Musculoskeletal: Negative for myalgias and neck pain.        Nerve pain in right leg   Neurological: Negative for dizziness, focal weakness and headaches.   Psychiatric/Behavioral: Negative for depression.        Physical Exam  Temp:  [36 °C (96.8 °F)-37.7 °C (99.9 °F)] 36.2 °C (97.2 °F)  Pulse:  [] 73  Resp:  [14-18] 15  BP: ()/(52-73) 102/58  SpO2:  [89 %-100 %] 89 %    Physical Exam  Vitals and nursing note reviewed.   Constitutional:       General: He is not in acute distress.     Appearance: Normal appearance. He is not ill-appearing.      Comments: Cachetic   HENT:      Head: Normocephalic and atraumatic.      Mouth/Throat:      Mouth: Mucous membranes are moist.      Pharynx: Oropharynx is clear.   Eyes:      Conjunctiva/sclera: Conjunctivae normal.   Cardiovascular:      Rate and Rhythm: Normal rate and regular rhythm.      Pulses: Normal pulses.      Heart sounds: Murmur heard.     Pulmonary:      Effort: Pulmonary effort is normal. No respiratory distress.      Breath sounds: Normal breath sounds. No wheezing.   Abdominal:      General: Bowel sounds are normal. There is no distension.      Palpations: Abdomen is soft.      Tenderness: There is no guarding.      Comments: Peritoneal catheter noted (C/D/I)   Musculoskeletal:         General: No swelling or tenderness. Normal range of motion.      Comments: Left BKA stump - C/D/I   Skin:     General: Skin is warm and dry.      Capillary Refill: Capillary refill takes less than 2 seconds.      Coloration: Skin is not jaundiced.   Neurological:      General: No focal deficit present.      Mental Status: He is alert and oriented to person, place, and time.   Psychiatric:         Mood and Affect: Mood normal.          Fluids    Intake/Output Summary (Last 24 hours) at 6/8/2021 1606  Last data filed at 6/8/2021 1300  Gross per 24 hour   Intake 125 ml   Output 480 ml   Net -355 ml       Laboratory  Recent Labs     06/06/21  0730 06/07/21  0625   WBC 6.8 7.4   RBC 3.42* 3.58*   HEMOGLOBIN 11.3* 12.0*   HEMATOCRIT 34.8* 36.5*   .8* 102.0*   MCH 33.0 33.5*   MCHC 32.5* 32.9*   RDW 63.2* 63.1*   PLATELETCT 149* 151*   MPV 10.0 9.8     Recent Labs     06/06/21  0730 06/07/21  0625 06/08/21  0550   SODIUM 133* 132* 134*   POTASSIUM 3.2* 2.8* 3.6   CHLORIDE 90* 89* 91*   CO2 27 28 26   GLUCOSE 62* 70 103*   BUN 69* 59* 53*   CREATININE 14.22* 12.77* 11.92*   CALCIUM 8.9 8.9 9.0     Recent Labs     06/06/21  0730 06/07/21  0625 06/08/21  0550   INR 2.73* 3.33* 3.31*               Imaging  DX-UPPER GI-SERIES WITH KUB   Final Result      1.  Unremarkable upper GI exam.         EC-ECHOCARDIOGRAM COMPLETE W/O CONT   Final Result      CT-CHEST (THORAX) W/O   Final Result      1.  Abnormal lung parenchyma      2.  Specifically, there are multifocal bilateral airspace process, 14 mm right middle lobe groundglass density, posterior aspect right lower lobe 12 mm area of spiculation and probably underlying emphysema.      3.  Pulmonary findings are nonspecific and could be inflammation, infection, or postinflammatory scarring. Neoplasm is less likely.      4.  Dense aortic and branch vessel atherosclerotic plaque      5.  Pulmonary artery hypertension      6.  Very small bilateral pleural effusion      Fleischner Society pulmonary nodule recommendations:      Ground Glass and Part Solid: No routine follow-up      Comments: In certain suspicious nodules less than 6 mm, consider follow-up at 2 and 4 years. If solid component(s) or growth develops, consider resection.      Note: These recommendations do not apply to lung cancer screening, patients with immunosuppression, or patients with known primary cancer.      Fleischner Society 2017  Guidelines for Management of Incidentally Detected Pulmonary Nodules in Adults      OUTSIDE IMAGES-US ABDOMEN   Final Result      OUTSIDE IMAGES-DX CHEST   Final Result      CT-ABDOMEN-PELVIS WITH   Final Result   Addendum 1 of 1   On further review, the SMA and celiac axis are noted to be patent.    Contrast is difficult to confirm within the diminutive and markedly    atherosclerotic renal arteries.      Final      1.  No acute abdominal or pelvic findings.   2.  Bilaterally small atrophic appearing kidneys consistent with renal failure, with a dialysis catheter noted in the pelvic cavity.   3.  Colonic diverticulosis without diverticulitis.      NM-GASTRIC EMPTYING    (Results Pending)        Assessment/Plan  * Failure to thrive in adult  Assessment & Plan  Reported weight loss of 45 pounds over the past few months due to poor oral intake  CT abd/pelvis with no evidence of mass or tumor  CT chest - noted nodules (will need to follow up outpatient); may be a candidate for possible outpatient colonoscopy   RD consult  Current challenges with inability to tolerate po, N/V and severe constipation    We discussed about Abd/Pel CT and UGI series - no evidence of blockage at this point. Reglan has also been added for possible motility issue. He has been having small BM since yesterday. Additional meds like haldol prn available for N/V.     Unclear at this point how PD and mental aspect are affecting his current issues with failure to thrive.     He is known to [facility] - we discussed about possibly having GI team evaluate.   PT OT            Systolic CHF, chronic (HCC)- (present on admission)  Assessment & Plan  No clinic signs of fluid overload or decompensation  C/w home regimen    ACP (advance care planning)  Assessment & Plan  Plan of care and rationale for hospitalization discussed on admission  FULL code status confirmed      Anxiety  Assessment & Plan  PRN Xanax    PVD (peripheral vascular disease)  (HCC)  Assessment & Plan  S/p left BKA  Former smoker  H/o vein graft  Cont Warfarin/Effient/statin    Severe protein-calorie malnutrition (HCC)  Assessment & Plan  PO intake as tolerated  Ensure  RD consult    Superior mesenteric artery stenosis (HCC)  Assessment & Plan  Transferred for concern for SMA thrombosis -- reported to have +finding on CT AP previously - however, no report seen at [facility] Arrival    CT AP at [facility]: no SMA thrombosis  INR still elevated 3    Non-rheumatic mitral regurgitation- (present on admission)  Assessment & Plan  Severe with low EF  No clinical signs of fluid overload  Following Cardiology outpatient    CAD (coronary artery disease)- (present on admission)  Assessment & Plan  Warfarin/Effient/BB/statin  Isodil  Ranexa for angina  Restart Coumadin    Essential hypertension- (present on admission)  Assessment & Plan  Coreg    Anemia in chronic kidney disease (CKD)- (present on admission)  Assessment & Plan  Chronic, no gross bleeding    C. 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

## 2021-06-08 NOTE — PROGRESS NOTES
Peritoneal dialysis machine was alarming and woke patient. Patient stated that one of the bags had not been hooked up. Contacted dialysis via number on machine.    Nurse coming to hook up final bag. Treatment is automatically stopped. Per dialysis, they will restart when they fix the issue.

## 2021-06-08 NOTE — PROCEDURES
Pre-procedure Diagnoses   Bilious vomiting with nausea [R11.14]   Weight loss [R63.4]   Post-procedure Diagnoses   Acute superficial gastritis without hemorrhage [K29.00]   Benign gastric polyp [K31.7]   Procedures   ESOPHAGOGASTRODUODENOSCOPY (EGD) WITH SNARE POLYPECTOMY   ESOPHAGOGASTRODUODENOSCOPY OR UPPER GI ENDOSCOPY,BIOPSIES       Endoscopist: Antonio Calixto MD, [facility], [facility] [place]    Anesthesiologist: Pola Alba M.D.    Consent: Patient seen and examined before proceeding with esophagogastroduodenoscopy with biopsies and/or other endotherapy under anesthesia. Risks, benefits, and alternatives of aforementioned procedures were again discussed with patient and/or family member(s) in detail before proceeding.  Patient was given opportunities to ask questions and discuss other options.  Risks including but not limited to perforation, infection, bleeding, missed lesion(s), possible need for surgery(ies) and/or interventional radiology, possible need for repeat procedure(s) and/or additional testing, hospitalization possibly prolonged, cardiac and/or pulmonary event, aspiration, hypoxia, stroke, medication (s) and/or anesthesia reaction(s), indefinite diagnosis, discomfort/pain, unsuccessful and/or incomplete procedure, ineffective therapy, persistent symptoms, damage to adjacent organs/structure and/or vascular, and other adverse event(s) possibly life-threatening.  Interactive discussion was undertaken with Layman's terms.  I answered questions in full and to satisfaction.  I gave opportunity to cancel, delay and/or reschedule if not completely comfortable with proceeding.  Patient stated understanding and acceptance of these risks, and wished to proceed.   Informed consent was given in clear state of mind and paper permit was confirmed to have been signed before proceeding.    Endoscopic procedures in detail: Diagnostic flexible Olympus endoscope was inserted from mouth into second portion of the duodenum,  retroflexion was performed in the stomach.  Gastric and duodenal biopsies were obtained and sent to pathology in separate containers.  Gastric polyp was snare and retrieved.  I suctioned insufflated air and stomach fluid contents upon removal.      Procedure times:  - In-room 12:14  - Start 12:24  - Completed 12:29  - Out of room per nursing records    Esophagogastroduodenoscopy Findings:  - Esophagus: endoscopically unremarkable.   - Stomach: two small 3 to 7mm in size sessile gastric body polyps, one snared for sampling.  Non-erosive mild gastritis, biopsied to evaluate H.pylori status.   - Duodenum: endoscopically unremarkable to 2nd portion, biopsies obtained to exclude celiac sprue.    Impression:  1. Gastric polyps  2. Non-erosive gastritis    Recommendations:   1.  Routine post-endoscopy anesthesia recovery care.  Transfer back to prior hospital room when patient is awake, alert and comfortable, when recovery criteria are met.  Aspiration and fall precautions x 24 hours.   2.  Increase prilosec to 40mg QD  3.  Check GES  4.  Follow-up biopsies.  5.  Will need close follow-up with established GI Deidra Madrigal or Dr. Hunter as an outpatient.  6.  I spoke to patient and recovery nurse about impression, diagnosis and recommendations.  I answered questions in full and to satisfaction

## 2021-06-08 NOTE — PROGRESS NOTES
[facility] Progress Note     CCPD initiated at 1845 to run for 9 hours using 1.5% PD solution per /NP TANIA Hooper.     Patient assessed prior to therapy. Orders reviewed, program settings entered/verified.  Patient AOx4. Patient reports episodes of nausea/vomiting throughout the day. BP: 98/63  LLQ PD catheter aseptically connected PD cycler without any issues.   Exit site cleansed, gentamycin ointment applied to exit site, dressing changed, no signs of infection noted at exit site.      Report given to primary care RN LOAN Espinoza including troubleshooting, emergency disconnection procedure, on-call Dialysis RN contact information ([phone]).      Please call for any issues with hemodynamic instability/persistent alarms/patient not tolerating therapy.

## 2021-06-09 LAB
ALBUMIN SERPL BCP-MCNC: 3.2 G/DL (ref 3.2–4.9)
ALBUMIN/GLOB SERPL: 1.2 G/DL
ALP SERPL-CCNC: 114 U/L (ref 30–99)
ALT SERPL-CCNC: 46 U/L (ref 2–50)
ANION GAP SERPL CALC-SCNC: 18 MMOL/L (ref 7–16)
AST SERPL-CCNC: 62 U/L (ref 12–45)
BASOPHILS # BLD AUTO: 0.4 % (ref 0–1.8)
BASOPHILS # BLD: 0.05 K/UL (ref 0–0.12)
BILIRUB SERPL-MCNC: 0.7 MG/DL (ref 0.1–1.5)
BUN SERPL-MCNC: 61 MG/DL (ref 8–22)
CALCIUM SERPL-MCNC: 9 MG/DL (ref 8.5–10.5)
CHLORIDE SERPL-SCNC: 91 MMOL/L (ref 96–112)
CO2 SERPL-SCNC: 22 MMOL/L (ref 20–33)
CREAT SERPL-MCNC: 11.75 MG/DL (ref 0.5–1.4)
EOSINOPHIL # BLD AUTO: 0.06 K/UL (ref 0–0.51)
EOSINOPHIL NFR BLD: 0.5 % (ref 0–6.9)
ERYTHROCYTE [DISTWIDTH] IN BLOOD BY AUTOMATED COUNT: 63.4 FL (ref 35.9–50)
GLOBULIN SER CALC-MCNC: 2.6 G/DL (ref 1.9–3.5)
GLUCOSE SERPL-MCNC: 95 MG/DL (ref 65–99)
HCT VFR BLD AUTO: 37.8 % (ref 42–52)
HGB BLD-MCNC: 12.3 G/DL (ref 14–18)
IMM GRANULOCYTES # BLD AUTO: 0.05 K/UL (ref 0–0.11)
IMM GRANULOCYTES NFR BLD AUTO: 0.4 % (ref 0–0.9)
INR PPP: 2.98 (ref 0.87–1.13)
LYMPHOCYTES # BLD AUTO: 1.11 K/UL (ref 1–4.8)
LYMPHOCYTES NFR BLD: 9.9 % (ref 22–41)
MCH RBC QN AUTO: 33.4 PG (ref 27–33)
MCHC RBC AUTO-ENTMCNC: 32.5 G/DL (ref 33.7–35.3)
MCV RBC AUTO: 102.7 FL (ref 81.4–97.8)
MONOCYTES # BLD AUTO: 0.97 K/UL (ref 0–0.85)
MONOCYTES NFR BLD AUTO: 8.6 % (ref 0–13.4)
NEUTROPHILS # BLD AUTO: 8.99 K/UL (ref 1.82–7.42)
NEUTROPHILS NFR BLD: 80.2 % (ref 44–72)
NRBC # BLD AUTO: 0 K/UL
NRBC BLD-RTO: 0 /100 WBC
PLATELET # BLD AUTO: 192 K/UL (ref 164–446)
PMV BLD AUTO: 10.2 FL (ref 9–12.9)
POTASSIUM SERPL-SCNC: 4.2 MMOL/L (ref 3.6–5.5)
PROT SERPL-MCNC: 5.8 G/DL (ref 6–8.2)
PROTHROMBIN TIME: 31.9 SEC (ref 12–14.6)
RBC # BLD AUTO: 3.68 M/UL (ref 4.7–6.1)
SODIUM SERPL-SCNC: 131 MMOL/L (ref 135–145)
WBC # BLD AUTO: 11.2 K/UL (ref 4.8–10.8)

## 2021-06-09 PROCEDURE — 85025 COMPLETE CBC W/AUTO DIFF WBC: CPT

## 2021-06-09 PROCEDURE — 700111 HCHG RX REV CODE 636 W/ 250 OVERRIDE (IP): Performed by: STUDENT IN AN ORGANIZED HEALTH CARE EDUCATION/TRAINING PROGRAM

## 2021-06-09 PROCEDURE — A9270 NON-COVERED ITEM OR SERVICE: HCPCS | Performed by: HOSPITALIST

## 2021-06-09 PROCEDURE — 770001 HCHG ROOM/CARE - MED/SURG/GYN PRIV*

## 2021-06-09 PROCEDURE — 36415 COLL VENOUS BLD VENIPUNCTURE: CPT

## 2021-06-09 PROCEDURE — 700102 HCHG RX REV CODE 250 W/ 637 OVERRIDE(OP): Performed by: STUDENT IN AN ORGANIZED HEALTH CARE EDUCATION/TRAINING PROGRAM

## 2021-06-09 PROCEDURE — 700102 HCHG RX REV CODE 250 W/ 637 OVERRIDE(OP): Performed by: INTERNAL MEDICINE

## 2021-06-09 PROCEDURE — A9270 NON-COVERED ITEM OR SERVICE: HCPCS | Performed by: STUDENT IN AN ORGANIZED HEALTH CARE EDUCATION/TRAINING PROGRAM

## 2021-06-09 PROCEDURE — 80053 COMPREHEN METABOLIC PANEL: CPT

## 2021-06-09 PROCEDURE — A9270 NON-COVERED ITEM OR SERVICE: HCPCS | Performed by: INTERNAL MEDICINE

## 2021-06-09 PROCEDURE — 700102 HCHG RX REV CODE 250 W/ 637 OVERRIDE(OP): Performed by: HOSPITALIST

## 2021-06-09 PROCEDURE — 99232 SBSQ HOSP IP/OBS MODERATE 35: CPT | Performed by: HOSPITALIST

## 2021-06-09 PROCEDURE — 3E1M39Z IRRIGATION OF PERITONEAL CAVITY USING DIALYSATE, PERCUTANEOUS APPROACH: ICD-10-PCS | Performed by: INTERNAL MEDICINE

## 2021-06-09 PROCEDURE — 85610 PROTHROMBIN TIME: CPT

## 2021-06-09 PROCEDURE — 90945 DIALYSIS ONE EVALUATION: CPT

## 2021-06-09 RX ORDER — WARFARIN SODIUM 2.5 MG/1
1.25 TABLET ORAL DAILY
Status: DISCONTINUED | OUTPATIENT
Start: 2021-06-09 | End: 2021-06-10

## 2021-06-09 RX ADMIN — HALOPERIDOL LACTATE 1 MG: 5 INJECTION, SOLUTION INTRAMUSCULAR at 04:39

## 2021-06-09 RX ADMIN — GENTAMICIN SULFATE: 1 OINTMENT TOPICAL at 19:03

## 2021-06-09 RX ADMIN — ONDANSETRON 4 MG: 4 TABLET, ORALLY DISINTEGRATING ORAL at 16:45

## 2021-06-09 RX ADMIN — ISOSORBIDE DINITRATE 30 MG: 30 TABLET ORAL at 16:45

## 2021-06-09 RX ADMIN — METOCLOPRAMIDE 5 MG: 10 TABLET ORAL at 09:48

## 2021-06-09 RX ADMIN — CARVEDILOL 6.25 MG: 6.25 TABLET, FILM COATED ORAL at 16:45

## 2021-06-09 RX ADMIN — OXYCODONE 5 MG: 5 TABLET ORAL at 20:46

## 2021-06-09 RX ADMIN — ONDANSETRON 4 MG: 4 TABLET, ORALLY DISINTEGRATING ORAL at 21:14

## 2021-06-09 RX ADMIN — ATORVASTATIN CALCIUM 40 MG: 40 TABLET, FILM COATED ORAL at 20:13

## 2021-06-09 RX ADMIN — ALPRAZOLAM 0.25 MG: 0.25 TABLET ORAL at 20:46

## 2021-06-09 RX ADMIN — METOCLOPRAMIDE 5 MG: 10 TABLET ORAL at 20:13

## 2021-06-09 RX ADMIN — RANOLAZINE 500 MG: 500 TABLET, FILM COATED, EXTENDED RELEASE ORAL at 20:46

## 2021-06-09 RX ADMIN — DOCUSATE SODIUM 50 MG AND SENNOSIDES 8.6 MG 1 TABLET: 8.6; 5 TABLET, FILM COATED ORAL at 16:45

## 2021-06-09 RX ADMIN — WARFARIN SODIUM 1.25 MG: 2.5 TABLET ORAL at 17:06

## 2021-06-09 RX ADMIN — CARVEDILOL 6.25 MG: 6.25 TABLET, FILM COATED ORAL at 10:05

## 2021-06-09 ASSESSMENT — ENCOUNTER SYMPTOMS
HEARTBURN: 0
DIZZINESS: 0
CONSTIPATION: 0
CHILLS: 0
ABDOMINAL PAIN: 0
PALPITATIONS: 0
HALLUCINATIONS: 0
DIARRHEA: 1
NERVOUS/ANXIOUS: 0
HEADACHES: 0
FLANK PAIN: 0
FOCAL WEAKNESS: 0
SHORTNESS OF BREATH: 0
MYALGIAS: 0
COUGH: 0
FALLS: 0
WEIGHT LOSS: 1
SORE THROAT: 0
NECK PAIN: 0
BLOOD IN STOOL: 0
STRIDOR: 0
VOMITING: 1
BLURRED VISION: 0
SEIZURES: 0
LOSS OF CONSCIOUSNESS: 0
DEPRESSION: 0
FEVER: 0
NAUSEA: 1
VOMITING: 0

## 2021-06-09 ASSESSMENT — PAIN SCALES - GENERAL: PAIN_LEVEL: 2

## 2021-06-09 ASSESSMENT — PAIN DESCRIPTION - PAIN TYPE
TYPE: ACUTE PAIN
TYPE: ACUTE PAIN

## 2021-06-09 NOTE — PROGRESS NOTES
[facility] Services Progress Note    CCPD disconnected aseptically at 0705.   Patient stable, alert and oriented x 4.   PD exit site assessed. No s/sx of infection, no redness, no discharges. PD dressing clean dry and intact.    Due PD dressing tonight.    Effluent is clear and yellow without fibrin noted.       24 hour UF: 318 mL  (Total UF 310mL   + Initial drain 18 mL  - Last fill 0 mL)      Report given to Primary KELLY Ordaz R.N.

## 2021-06-09 NOTE — PROGRESS NOTES
[facility] Medicine Daily Progress Note    Date of Service  6/9/2021    Chief Complaint  66 y.o. male presented 6/4/2021 with N/V, unable to tolerate po and constipation    Hospital Course  Per Dr. Kaye's HPI:  66 y.o. M with ESRD on CCPD, CAD s/p multiple stents, valvular heart disease, HTN, HLD presented 6/4/2021 to [facility] with complaint of nausea, vomiting poor oral intake and weight loss of 45 pounds over the past month; then subsequently transferred to [facility] as a divert on the same day.  It was noted that he had CTA abdominal pelvis revealed SMA stenosis. He reported having history of C. difficile in April 2020, has not had diarrhea over the past month and reported to have negative C. difficile study a week ago. Upon admission, patient appeared comfortable with no deranged vital signs.  Abdominal exam is benign, doubt he has SMA stenosis given benign exam and on warfarin with therapeutic INR.  Additionally, he does take Effient in addition to warfarin for his vascular disease.  However, CTAP with contrast was ordered on admisison to ensure no SMA thrombosis.  Patient was admitted to medicine service for further evaluation and treatment.    6/5: vitals wnl; afebrile. No pain reported.   [facility] Nephrology Dr. Cantrell - knows this patient well. Peritoneal HD restarted.  During rounds, tolerated oral diet and reported feeling better. Refused suppository and wanted to try oral laxative first.   CT abd/pelvis with: the SMA and celiac axis are noted to be patent;  No acute abdominal or pelvic findings. CXR: no acute cardiopulm disease  US ABd: No sonographic signs of acute cholecystitis.    6/6: vitals and exam grossly unremarkable   At bedside, a bit discouraged. Lunch/dinner was late plus peritoneal HD was early. With all these changes in routine, he vomited lunch when Miralax was given. Current challenges with inability to tolerate po, N/V and severe constipation discussed - this has happened  multiple times. Previously resolved on his own. Early diet tray ordered. He agreed for suppository and UGI series done - grossly unremarkable.   CT chest - some nodules noted (inflammation, infection vs scarring) - advised to follow up.       Interval Problem Update  6/7  Vitals reviewed; afebrile.  The rest of the vitals within normal parameters.  Pain scale reported - none  Blood glucose in last 24hrs - around 70s  I/O - Still have poor oral intake; vomited early this AM; finally had a small BM 6/6 PM    At bedside, no acute complain but reports of still vomiting and inability to tolerate po. Had a bite of breakfast and nausea (despite having zofran prior to his meal).     We discussed about Abd/Pel CT and UGI series - no evidence of blockage at this point. Reglan has also been added for possible motility issue. He has been having small BM since yesterday. Additional meds like haldol prn available for N/V. Unclear at this point how PD and mental aspect are affecting his current issues with failure to thrive.     [facility] is known to [facility] - we discussed about possibly having GI team evaluate.     Labs reviewed  Na 132, Cr 12.7  K 2.8 (being supplemented)  WBC 7.4, Plt 151  INR 3.33    Echo: Severely reduced left ventricular systolic function. Left ventricular ejection fraction is visually estimated to be 30%. Severe mitral regurgitation. Moderate aortic insufficiency. RVSP 45    UGI series with KUB - unremarkable upper GI exam    6/8 patient is resting in bed, he feels weak, he is n.p.o. for EGD today, denies nausea or vomiting.  6/9 In bed drinking sips of water, no fever or chills, GES pending, no fever or chills alert and oriented still feels weak.     Consultants/Specialty  Nephrology  GI    Code Status  Full Code    Disposition  To be determined      Review of Systems  Review of Systems   Constitutional: Positive for weight loss. Negative for chills and fever.   HENT: Negative for sore throat.    Eyes:  Negative for blurred vision.   Respiratory: Negative for cough.    Cardiovascular: Negative for chest pain and palpitations.   Gastrointestinal: Positive for nausea. Negative for abdominal pain, constipation, heartburn and vomiting.   Genitourinary: Negative for dysuria, frequency and urgency.   Musculoskeletal: Negative for myalgias and neck pain.        Nerve pain in right leg   Neurological: Negative for dizziness, focal weakness and headaches.   Psychiatric/Behavioral: Negative for depression.        Physical Exam  Temp:  [35.9 °C (96.7 °F)-36.6 °C (97.8 °F)] 36.2 °C (97.2 °F)  Pulse:  [] 102  Resp:  [16-18] 16  BP: (102-143)/(55-76) 135/72  SpO2:  [91 %-98 %] 96 %    Physical Exam  Vitals and nursing note reviewed.   Constitutional:       General: He is not in acute distress.     Appearance: Normal appearance. He is not ill-appearing.      Comments: Cachetic   HENT:      Head: Normocephalic and atraumatic.      Mouth/Throat:      Mouth: Mucous membranes are moist.      Pharynx: Oropharynx is clear.   Eyes:      Conjunctiva/sclera: Conjunctivae normal.   Cardiovascular:      Rate and Rhythm: Normal rate and regular rhythm.      Pulses: Normal pulses.      Heart sounds: Murmur heard.     Pulmonary:      Effort: Pulmonary effort is normal. No respiratory distress.      Breath sounds: Normal breath sounds. No rales.   Abdominal:      General: Bowel sounds are normal. There is no distension.      Palpations: Abdomen is soft.      Tenderness: There is no abdominal tenderness.      Comments: Peritoneal catheter noted (C/D/I)   Musculoskeletal:         General: No swelling or tenderness. Normal range of motion.      Comments: Left BKA stump - C/D/I   Skin:     General: Skin is warm and dry.      Capillary Refill: Capillary refill takes less than 2 seconds.      Coloration: Skin is not jaundiced.   Neurological:      General: No focal deficit present.      Mental Status: He is alert and oriented to person, place,  and time.   Psychiatric:         Mood and Affect: Mood normal.         Fluids    Intake/Output Summary (Last 24 hours) at 6/9/2021 1606  Last data filed at 6/9/2021 0715  Gross per 24 hour   Intake --   Output 358 ml   Net -358 ml       Laboratory  Recent Labs     06/07/21  0625 06/09/21  0702   WBC 7.4 11.2*   RBC 3.58* 3.68*   HEMOGLOBIN 12.0* 12.3*   HEMATOCRIT 36.5* 37.8*   .0* 102.7*   MCH 33.5* 33.4*   MCHC 32.9* 32.5*   RDW 63.1* 63.4*   PLATELETCT 151* 192   MPV 9.8 10.2     Recent Labs     06/07/21  0625 06/08/21  0550 06/09/21  0702   SODIUM 132* 134* 131*   POTASSIUM 2.8* 3.6 4.2   CHLORIDE 89* 91* 91*   CO2 28 26 22   GLUCOSE 70 103* 95   BUN 59* 53* 61*   CREATININE 12.77* 11.92* 11.75*   CALCIUM 8.9 9.0 9.0     Recent Labs     06/07/21  0625 06/08/21  0550 06/09/21  0702   INR 3.33* 3.31* 2.98*               Imaging  DX-UPPER GI-SERIES WITH KUB   Final Result      1.  Unremarkable upper GI exam.         EC-ECHOCARDIOGRAM COMPLETE W/O CONT   Final Result      CT-CHEST (THORAX) W/O   Final Result      1.  Abnormal lung parenchyma      2.  Specifically, there are multifocal bilateral airspace process, 14 mm right middle lobe groundglass density, posterior aspect right lower lobe 12 mm area of spiculation and probably underlying emphysema.      3.  Pulmonary findings are nonspecific and could be inflammation, infection, or postinflammatory scarring. Neoplasm is less likely.      4.  Dense aortic and branch vessel atherosclerotic plaque      5.  Pulmonary artery hypertension      6.  Very small bilateral pleural effusion      Fleischner Society pulmonary nodule recommendations:      Ground Glass and Part Solid: No routine follow-up      Comments: In certain suspicious nodules less than 6 mm, consider follow-up at 2 and 4 years. If solid component(s) or growth develops, consider resection.      Note: These recommendations do not apply to lung cancer screening, patients with immunosuppression, or  patients with known primary cancer.      Fleischner Society 2017 Guidelines for Management of Incidentally Detected Pulmonary Nodules in Adults      OUTSIDE IMAGES-US ABDOMEN   Final Result      OUTSIDE IMAGES-DX CHEST   Final Result      CT-ABDOMEN-PELVIS WITH   Final Result   Addendum 1 of 1   On further review, the SMA and celiac axis are noted to be patent.    Contrast is difficult to confirm within the diminutive and markedly    atherosclerotic renal arteries.      Final      1.  No acute abdominal or pelvic findings.   2.  Bilaterally small atrophic appearing kidneys consistent with renal failure, with a dialysis catheter noted in the pelvic cavity.   3.  Colonic diverticulosis without diverticulitis.      NM-GASTRIC EMPTYING    (Results Pending)        Assessment/Plan  * Failure to thrive in adult  Assessment & Plan  Reported weight loss of 45 pounds over the past few months due to poor oral intake  CT abd/pelvis with no evidence of mass or tumor  CT chest - noted nodules (will need to follow up outpatient); may be a candidate for possible outpatient colonoscopy   RD consult  Current challenges with inability to tolerate po, N/V and severe constipation    We discussed about Abd/Pel CT and UGI series - no evidence of blockage at this point. Reglan has also been added for possible motility issue. He has been having small BM since yesterday. Additional meds like haldol prn available for N/V.     Unclear at this point how PD and mental aspect are affecting his current issues with failure to thrive.     He is known to [facility] - we discussed about possibly having GI team evaluate.   PT OT            Systolic CHF, chronic (HCC)- (present on admission)  Assessment & Plan  No clinic signs of fluid overload or decompensation  C/w home regimen    ACP (advance care planning)  Assessment & Plan  Plan of care and rationale for hospitalization discussed on admission  FULL code status  confirmed      Anxiety  Assessment & Plan  PRN Xanax    PVD (peripheral vascular disease) (formerly [facility])  Assessment & Plan  S/p left BKA  Former smoker  H/o vein graft  Cont Warfarin/Effient/statin    Severe protein-calorie malnutrition (HCC)  Assessment & Plan  PO intake as tolerated  Ensure  RD consult    Superior mesenteric artery stenosis (formerly [facility])  Assessment & Plan  Transferred for concern for SMA thrombosis -- reported to have +finding on CT AP previously - however, no report seen at [facility] Arrival    CT AP at [facility]: no SMA thrombosis  INR     Non-rheumatic mitral regurgitation- (present on admission)  Assessment & Plan  Severe with low EF  No clinical signs of fluid overload  Following Cardiology outpatient    CAD (coronary artery disease)- (present on admission)  Assessment & Plan  Warfarin/Effient/BB/statin  Isodil  Ranexa for angina  Restart Coumadin    Essential hypertension- (present on admission)  Assessment & Plan  Coreg    Anemia in chronic kidney disease (CKD)- (present on admission)  Assessment & Plan  Chronic, no gross bleeding    C. difficile colitis  Assessment & Plan  H/o  Reported having negative result a week prior  No WBC, Cr elevated due to ESRD  No toxic swetha colon seen on CT AP  No diarrhea so far    ESRD on peritoneal dialysis (HCC)- (present on admission)  Assessment & Plan  Continue PD  Continue phosphate binders  Nephrology consulted for assistance with care  Stable    Intractable nausea and vomiting  Assessment & Plan  Antiemetic  Supportive care  No acute etiology seen on CTAP  Could be back up from constipation - has now started to have some BM    EGD did not show source of his symptoms  GES pending.   Tolerating small amount of food but still with nausea.     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

## 2021-06-09 NOTE — PROGRESS NOTES
Inpatient Anticoagulation Service Note    Date: 6/9/2021  Reason for Anticoagulation:Other (Comments) (PVD)   [id number] VASc Score: 3  HAS-BLED Score: 4    Hemoglobin Value: (!) 12.3  Hematocrit Value: (!) 37.8  Lab Platelet Value: 192  Target INR: 2.0 to 3.0    INR from last 7 days     Date/Time INR Value    06/09/21 0702  (!) 2.98    06/08/21 0550  (!) 3.31    06/07/21 06:25:01  (!) 3.33    06/06/21 07:30:01  (!) 2.73    06/05/21 0744  (!) 2.31        Dose from last 7 days     Date/Time Dose (mg)    06/09/21 1324  1.25    06/07/21 1206  1.25    06/06/21 1246  2.5    06/05/21 1212  2.5    06/04/21 2152  2.5        Average Dose (mg):  (Home dose: warfarin 2.5mg po daily)  Significant Interactions: Antiplatelet Medications, Aspirin, Proton Pump Inhibitor, Statin, Thyroid Medications  Bridge Therapy: No   Reversal Agent Administered: Not Applicable    Comments: Per GI, can resume warfarin. H/H low but stable. DDI noted. Npo status. INR trending down but within goal, will give a one time dose of 1.25 mg to prevent INR from trending down further and reassess pending repeat INR tomorrow.     Plan:  Warfarin 1.25 mg PO today  Education Material Provided?: No  Pharmacist suggested discharge dosing: tentatively plan on continuing home dose of 2.5mg po daily with repeat INR within 2-3 days of discharge.      Triny Alarcon, PharmD

## 2021-06-09 NOTE — CARE PLAN
The patient is Stable - Low risk of patient condition declining or worsening    Shift Goals  Clinical Goals: nutrition; peritoneal dialysis  Patient Goals: rest    Progress made toward(s) clinical / shift goals:  Discussed POC with pt, including tests and procedures. Pt educated to call before standing, calls appropriately.    Problem: Knowledge Deficit - Standard  Goal: Patient and family/care givers will demonstrate understanding of plan of care, disease process/condition, diagnostic tests and medications  Outcome: Progressing     Problem: Fall Risk  Goal: Patient will remain free from falls  Outcome: Progressing     Problem: Pain - Standard  Goal: Alleviation of pain or a reduction in pain to the patient’s comfort goal  Outcome: Progressing       Patient is not progressing towards the following goals: Pt unable to eat/drink without experiencing nausea and vomiting.       Problem: Nutrition  Goal: Patient's nutritional and fluid intake will be adequate or improve  Outcome: Not Progressing

## 2021-06-09 NOTE — PROGRESS NOTES
Pt aseptically connected to CCPD at 1859 using 3 bags of 1.5% pd solution to run for 9 hrs. Dressing changed, Gentamicin 0.1% applied at pd catheter as ordered. V/s stable.  No s/s of infection noted. Gave report to Fredy primary RN. See flow sheet for details.

## 2021-06-09 NOTE — PROGRESS NOTES
"  Gastroenterology Progress Note     Author: Antonio Calixto M.D.   Date & Time Created: 6/9/2021 9:06 AM    Chief Complaint:  Nausea and vomiting    Interval History:  Feels the same, still with nausea and vomiting. Denied abdominal pain.  Stools are semi-solid but not liquid.  Denied hematochezia or melena.  Denied further modifying factors, associated symptoms or timing issues.     History of Present Illness per consult note:    \"He is a 66-year-old male patient seen in consultation for nausea, vomiting, weight loss.  The patient was admitted on 6/4/2021 with complaints of nausea and vomiting with poor oral intake and weight loss.  The patient states that over the last 4 to 6 weeks he has had problems with intermittent nausea, vomiting that can occur randomly.  He states that if he vomits, often it will appear liquid and bilious in color.  However, at times he can regurgitate liquid or food from his stomach.  He cannot accurately tell me how often this occurs despite several questions asked in different ways about his vomiting.  It does sound like at times he can have food that sticks in his midesophagus when swallowing that he can regurgitate such as solid food, but this is not consistent.  He does not seem to have problems with liquids or the swallowing mechanism.  He states he is lost 35 to 45 pounds over the last month to month and a half.  He denies abdominal pain, fevers, chills, melena, hematochezia.  He denies GERD or heartburn symptoms.  The patient had similar symptoms of nausea and vomiting 2019 seen by Dr. Butch Hunter with [facility].  EGD performed by Dr. Scott Arciniega at that time demonstrated hiatal hernia and no other abnormalities.  Prior to that the patient did have what sounds to be bleeding esophageal and gastric ulcers, although I do not have primary data of this.  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  He also underwent previous left below the knee amputation related to his vascular disease.  He also at one point had imaging summary that suggested possible superior mesenteric artery stenosis, but the patient tells me that vascular surgery saw him and felt that he did not have this condition and no intervention was done at that time.     Labs and imaging reviewed.  CBC consistent with RBC 12.0, hematocrit 22.0, RDW 63.1, platelets 151.  Sed rate is normal 6/4/2021.  CMP with sodium 132, potassium 2.8, chloride 89, creatinine 12.70.  LFT 6/4/2021 are normal.  INR today 3.33  on Coumadin.  CT abdomen and pelvis with contrast on 6/4/2021 demonstrated no acute abdominal or pelvic findings.  SMA and celiac axis noted to be patent, although contrast is difficult to confirm within the diminutive and markedly atherosclerotic renal arteries.  Upper GI series with KUB demonstrated normal-appearing esophagus without stricture, esophagitis, or reflux.  Stomach and duodenum also appeared normal.\"       Review of Systems:  Review of Systems   Constitutional: Positive for weight loss. Negative for chills and fever.   HENT: Negative for sore throat.    Respiratory: Negative for cough, shortness of breath and stridor.    Cardiovascular: Negative for chest pain and palpitations.   Gastrointestinal: Positive for diarrhea, nausea and vomiting. Negative for blood in stool, constipation and melena.   Genitourinary: Negative for flank pain and hematuria.   Musculoskeletal: Negative for falls and neck pain.   Skin: Negative for itching and rash.   Neurological: Negative for seizures and loss of consciousness.   Psychiatric/Behavioral: Negative for hallucinations. The patient is not nervous/anxious.    All other systems reviewed and are negative.      Physical Exam:  Physical Exam  Vitals and nursing note reviewed. Exam conducted with a chaperone present.   Constitutional:      "  General: He is not in acute distress.     Appearance: Normal appearance. He is ill-appearing. He is not toxic-appearing or diaphoretic.   HENT:      Head: Normocephalic and atraumatic.      Nose: Nose normal. No congestion or rhinorrhea.      Mouth/Throat:      Mouth: Mucous membranes are moist.      Pharynx: Oropharynx is clear. No posterior oropharyngeal erythema.   Eyes:      General: No scleral icterus.     Extraocular Movements: Extraocular movements intact.      Conjunctiva/sclera: Conjunctivae normal.      Pupils: Pupils are equal, round, and reactive to light.   Cardiovascular:      Rate and Rhythm: Normal rate and regular rhythm.      Pulses: Normal pulses.      Heart sounds: Normal heart sounds.   Pulmonary:      Effort: Pulmonary effort is normal. No respiratory distress.      Breath sounds: Normal breath sounds. No stridor.   Abdominal:      General: Abdomen is flat. Bowel sounds are normal. There is no distension.      Palpations: Abdomen is soft.      Tenderness: There is no abdominal tenderness. There is no rebound.      Comments: Peritoneal dialysis catheter   Musculoskeletal:         General: Deformity present. Normal range of motion.      Cervical back: Normal range of motion and neck supple.      Comments: Left BKA   Skin:     General: Skin is warm and dry.      Coloration: Skin is not jaundiced or pale.   Neurological:      General: No focal deficit present.      Mental Status: He is alert. Mental status is at baseline.      Coordination: Coordination normal.   Psychiatric:         Mood and Affect: Mood normal.         Behavior: Behavior normal.         Thought Content: Thought content normal.         Judgment: Judgment normal.         Labs:          Recent Labs     06/07/21  0625 06/08/21  0550 06/09/21  0702   SODIUM 132* 134* 131*   POTASSIUM 2.8* 3.6 4.2   CHLORIDE 89* 91* 91*   CO2 28 26 22   BUN 59* 53* 61*   CREATININE 12.77* 11.92* 11.75*   CALCIUM 8.9 9.0 9.0     Recent Labs      21  0625 21  0550 21  0702   ALTSGPT  --  12 46   ASTSGOT  --  24 62*   ALKPHOSPHAT  --  103* 114*   TBILIRUBIN  --  0.7 0.7   GLUCOSE 70 103* 95     Recent Labs     21  0625 21  0550 21  0702   RBC 3.58*  --  3.68*   HEMOGLOBIN 12.0*  --  12.3*   HEMATOCRIT 36.5*  --  37.8*   PLATELETCT 151*  --  192   PROTHROMBTM 34.8* 34.7* 31.9*   INR 3.33* 3.31* 2.98*     Recent Labs     21  0625 21  0550 21  0702   WBC 7.4  --  11.2*   NEUTSPOLYS 74.10*  --  80.20*   LYMPHOCYTES 13.30*  --  9.90*   MONOCYTES 9.40  --  8.60   EOSINOPHILS 2.30  --  0.50   BASOPHILS 0.50  --  0.40   ASTSGOT  --  24 62*   ALTSGPT  --  12 46   ALKPHOSPHAT  --  103* 114*   TBILIRUBIN  --  0.7 0.7     Hemodynamics:  Temp (24hrs), Av.4 °C (97.6 °F), Min:35.9 °C (96.7 °F), Max:37 °C (98.6 °F)  Temperature: 36.6 °C (97.8 °F)  Pulse  Av.1  Min: 57  Max: 104   Blood Pressure : 133/68     Respiratory:    Respiration: 17, Pulse Oximetry: 95 %           Fluids:    Intake/Output Summary (Last 24 hours) at 2021 0906  Last data filed at 2021 0715  Gross per 24 hour   Intake 125 ml   Output 358 ml   Net -233 ml        GI/Nutrition:  Orders Placed This Encounter   Procedures   • Diet Order Diet: Cardiac     Standing Status:   Standing     Number of Occurrences:   1     Order Specific Question:   Diet:     Answer:   Cardiac [6]     Medical Decision Making, by Problem:  Active Hospital Problems    Diagnosis    • *Failure to thrive in adult [R62.7]    • Systolic CHF, chronic (HCC) [I50.22]    • Superior mesenteric artery stenosis (HCC) [K55.1]    • Severe protein-calorie malnutrition (HCC) [E43]    • PVD (peripheral vascular disease) (HCC) [I73.9]    • Anxiety [F41.9]    • ACP (advance care planning) [Z71.89]    • Non-rheumatic mitral regurgitation [I34.0]    • Hypothyroid [E03.9]    • CAD (coronary artery disease) [I25.10]    • Mixed hyperlipidemia [E78.2]    • Essential hypertension [I10]    •  Anemia in chronic kidney disease (CKD) [N18.9, D63.1]    • ESRD on peritoneal dialysis (HCC) [N18.6, Z99.2]    • C. difficile colitis [A04.72]    • Intractable nausea and vomiting [R11.2]    • Atherosclerosis of right lower extremity with rest pain (HCC) [I70.221]    • Status post below knee amputation of left lower extremity [Z89.512]    • Testicular cancer (HCC) [C62.90]      Problems:  1.  Nausea and vomiting, no evidence of stricture by EGD; cortisol was normal on 6/4/2021  2.  Non-erosive gastritis, biopsies results pending to evaluate H.pylori status  3.  Weight loss, duodenal biopsy results pending to evaluate for celiac sprue.  4.  Protein calorie malnutrition  5. Gastric polyps suspected benign fundic gland  6.  Peripheral vascular disease status post previous bypass surgery and left below the knee amputation currently on Coumadin  7.  End-stage renal disease on peritoneal dialysis  8.  History of possible superior mesenteric artery stenosis-no current CT evidence of this  9.  Hyperlipidemia  10.  Hypothyroidism  11.  Coronary artery disease  12.  Macrocytic anemia in chronic kidney disease  13.  COVID negative on 6/4  14.  Complex patient with multiple medical comorbidities with increased complexity medical decision making    Recommendations:  1.  Continue prilosec to 40mg QD  2.  GES pending for today, unable to do [place] protocol as inpatient  3.  Follow-up biopsy results  4.  Antiemetics per primary team  5.  Okay to restart anticoagulation per primary team.  6.  If GES shows gastroparesis then will need to increase Reglan and change to IV for a few days, as well as add erythromycin IV.  7.  If GES is normal and biopsies are negative then nausea and vomiting are likely from peritoneal dialysis.  8.  Will need close follow-up with established GI Deidra Madrigal or Dr. Hunter as an outpatient.       Quality-Core Measures

## 2021-06-09 NOTE — PROGRESS NOTES
"[facility] -  PROGRESS NOTE               Author: EZEQUIEL Basilio, CNN-NP  Collaborating Physician: Dr. Rivas  Date & Time: 6/9/2021  9:37 AM     HPI:  65 yo M PMH ESRD CCPD, HTN, anemia of CKD, CKD-MBD, HLD, and CAD who presents to ED with CC as above.  He has chronic intermittent N/V that he's been battling for a long time.  It leads to wide fluctuations in his po intake.  He also has severe CAD and non-cardiac atherosclerosis.  Recently he feel she has lost 45 lbs in the past month due to the N/V.  He's had a CTA in past that has revealed SMA steosis, but report was not available here at this institution.  Repeat imaging ordered by admit MD and he was admitted for further evaluation.  Has no abd pain and otherwise denies any associated F/C/CP/SOB.  No melena, hematochezia, hematemesis.  No HA, visual changes.    DAILY NEPHROLOGY SUMMARY:  6/05 - Consult done  6/06 - NAEO, +N/V; Unable to keep majority of food down  6/07 - sitting up in bed, still having poor intake vomited this am, PD UF: 461ml, had UGI: WNL, had large BM this am   6/08: sitting up in bed, feels miserable, weak and nauseated, for EGD today, discussed case with Dr. Rivas, dont believe PD is contributing factor as pt BUN is only mildly elevated and pt has been on PD for > 1 year, if GI workup neg could consider transitioning to HD to see if helps elevate symptoms, tolerated PD with UF: 450ml  6/09: Ambulating in room. C/o continued N/V and dehydration. S/p EGD w/bx's of stomach/duodenum.  318 mL net UF. GES today.        REVIEW OF SYSTEMS:    10 point ROS reviewed and is as per HPI/daily summary or otherwise negative    PMH/PSH/SH/FH: Reviewed and unchanged since admission note  CURRENT MEDICATIONS: Reviewed from admission to present day    VS:  /68   Pulse (!) 104   Temp 36.6 °C (97.8 °F) (Temporal)   Resp 17   Ht 1.778 m (5' 10\")   Wt 50.5 kg (111 lb 5.3 oz) Comment: 3.8 prosthetic/shoes subtracted "  SpO2 95%   BMI 15.97 kg/m²   Physical Exam  Nursing note reviewed.   Constitutional:       General: He is not in acute distress.     Appearance: Normal appearance. He is ill-appearing. He is not diaphoretic.      Comments: Cachectic    HENT:      Head: Normocephalic and atraumatic.   Neck:      Trachea: No tracheal tenderness.   Cardiovascular:      Rate and Rhythm: Normal rate.      Comments: No edema  Pulmonary:      Effort: Pulmonary effort is normal. No respiratory distress.   Abdominal:      General: There is no distension.      Comments: PD Cath in place   Musculoskeletal:         General: No deformity.      Comments: L BKA w/prosthesis   Skin:     Findings: No rash.   Neurological:      Mental Status: He is alert and oriented to person, place, and time.      Motor: No atrophy.   Psychiatric:         Behavior: Behavior normal.     Fluids:  In: 125 [P.O.:25; I.V.:100]  Out: 40     LABS:  Recent Labs     06/07/21  0625 06/08/21  0550 06/09/21  0702   SODIUM 132* 134* 131*   POTASSIUM 2.8* 3.6 4.2   CHLORIDE 89* 91* 91*   CO2 28 26 22   GLUCOSE 70 103* 95   BUN 59* 53* 61*   CREATININE 12.77* 11.92* 11.75*   CALCIUM 8.9 9.0 9.0       IMPRESSION:  # ESRD   - Etiology likely 2/2 HTN   - PD Rx: 9hrs tx time; 6 x 2.5L exchanges    * 2.5/1.5 % fluid; No last fill/no mid day exchange   - Maybe failing PD due to his GI issues, nutrition status is poor   - discussed case with Dr. Rivas, dont believe PD is contributing factor as pt BUN is only mildly elevated and pt has been on PD for > 1 year, if GI workup neg could consider transitioning to HD to see if helps eleviate symptoms  # N/V   - Etiology unclear   - Chronic intermittent issue   - Followed by GI and has had extensive work up, upper GI: WNL , for EGD 6/8    - Bx stomach and duodenum pending    - Atherosclerosis of GI vasculature a possibility   - No evidence of stricture, no erosive gastritis   - GES today: gastroparesis vs. PD  # HTN, controlled    - Goal BP  < 140/90   - Stable  # Anemia of CKD   - Goal Hgb 10-11   - Stable  # CKD-MBD   - managed at outpt clinic    - hold phos binder for now with N/V  # Hypokalemia, improved    - KCL IV x 1 6/6   - continue PO KCL 20meq daily   # Hyponatremia, mild   - UF with HD as tolerated      PLAN:  - Daily CCPD  - Adjust Rx of PD as needed  - No dietary protein restrictions  - Dose all meds per ESRD  - Regular diet ok  - No fluid restrictions at this time  - Nepro with meals  - hold phos binders   - continue PO KCL daily

## 2021-06-09 NOTE — CARE PLAN
Problem: Nutritional:  Goal: Achieve adequate nutritional intake  Description: Patient will consume >50% of meals + supplements.  Outcome: Progressing   See RD note.

## 2021-06-10 ENCOUNTER — APPOINTMENT (OUTPATIENT)
Dept: RADIOLOGY | Facility: MEDICAL CENTER | Age: 66
DRG: 291 | End: 2021-06-10
Attending: INTERNAL MEDICINE
Payer: MEDICARE

## 2021-06-10 LAB
ALBUMIN SERPL BCP-MCNC: 3 G/DL (ref 3.2–4.9)
ANION GAP SERPL CALC-SCNC: 14 MMOL/L (ref 7–16)
BASOPHILS # BLD AUTO: 0.3 % (ref 0–1.8)
BASOPHILS # BLD: 0.02 K/UL (ref 0–0.12)
BUN SERPL-MCNC: 62 MG/DL (ref 8–22)
CALCIUM SERPL-MCNC: 8.6 MG/DL (ref 8.5–10.5)
CHLORIDE SERPL-SCNC: 89 MMOL/L (ref 96–112)
CO2 SERPL-SCNC: 26 MMOL/L (ref 20–33)
CREAT SERPL-MCNC: 11.84 MG/DL (ref 0.5–1.4)
EOSINOPHIL # BLD AUTO: 0.09 K/UL (ref 0–0.51)
EOSINOPHIL NFR BLD: 1.3 % (ref 0–6.9)
ERYTHROCYTE [DISTWIDTH] IN BLOOD BY AUTOMATED COUNT: 61.4 FL (ref 35.9–50)
GLUCOSE SERPL-MCNC: 93 MG/DL (ref 65–99)
HCT VFR BLD AUTO: 32.3 % (ref 42–52)
HGB BLD-MCNC: 10.2 G/DL (ref 14–18)
IMM GRANULOCYTES # BLD AUTO: 0.03 K/UL (ref 0–0.11)
IMM GRANULOCYTES NFR BLD AUTO: 0.4 % (ref 0–0.9)
INR PPP: 3.23 (ref 0.87–1.13)
LYMPHOCYTES # BLD AUTO: 1.48 K/UL (ref 1–4.8)
LYMPHOCYTES NFR BLD: 20.7 % (ref 22–41)
MCH RBC QN AUTO: 32.1 PG (ref 27–33)
MCHC RBC AUTO-ENTMCNC: 31.6 G/DL (ref 33.7–35.3)
MCV RBC AUTO: 101.6 FL (ref 81.4–97.8)
MONOCYTES # BLD AUTO: 0.71 K/UL (ref 0–0.85)
MONOCYTES NFR BLD AUTO: 9.9 % (ref 0–13.4)
NEUTROPHILS # BLD AUTO: 4.83 K/UL (ref 1.82–7.42)
NEUTROPHILS NFR BLD: 67.4 % (ref 44–72)
NRBC # BLD AUTO: 0 K/UL
NRBC BLD-RTO: 0 /100 WBC
PHOSPHATE SERPL-MCNC: 4.9 MG/DL (ref 2.5–4.5)
PLATELET # BLD AUTO: 128 K/UL (ref 164–446)
PMV BLD AUTO: 10.8 FL (ref 9–12.9)
POTASSIUM SERPL-SCNC: 3.4 MMOL/L (ref 3.6–5.5)
PROTHROMBIN TIME: 34 SEC (ref 12–14.6)
RBC # BLD AUTO: 3.18 M/UL (ref 4.7–6.1)
SODIUM SERPL-SCNC: 129 MMOL/L (ref 135–145)
WBC # BLD AUTO: 7.2 K/UL (ref 4.8–10.8)

## 2021-06-10 PROCEDURE — 36415 COLL VENOUS BLD VENIPUNCTURE: CPT

## 2021-06-10 PROCEDURE — 700102 HCHG RX REV CODE 250 W/ 637 OVERRIDE(OP): Performed by: STUDENT IN AN ORGANIZED HEALTH CARE EDUCATION/TRAINING PROGRAM

## 2021-06-10 PROCEDURE — 700111 HCHG RX REV CODE 636 W/ 250 OVERRIDE (IP): Performed by: STUDENT IN AN ORGANIZED HEALTH CARE EDUCATION/TRAINING PROGRAM

## 2021-06-10 PROCEDURE — 770001 HCHG ROOM/CARE - MED/SURG/GYN PRIV*

## 2021-06-10 PROCEDURE — A9270 NON-COVERED ITEM OR SERVICE: HCPCS | Performed by: STUDENT IN AN ORGANIZED HEALTH CARE EDUCATION/TRAINING PROGRAM

## 2021-06-10 PROCEDURE — 700111 HCHG RX REV CODE 636 W/ 250 OVERRIDE (IP): Mod: JG | Performed by: INTERNAL MEDICINE

## 2021-06-10 PROCEDURE — 85025 COMPLETE CBC W/AUTO DIFF WBC: CPT

## 2021-06-10 PROCEDURE — A9270 NON-COVERED ITEM OR SERVICE: HCPCS | Performed by: INTERNAL MEDICINE

## 2021-06-10 PROCEDURE — 700105 HCHG RX REV CODE 258: Performed by: INTERNAL MEDICINE

## 2021-06-10 PROCEDURE — 700102 HCHG RX REV CODE 250 W/ 637 OVERRIDE(OP): Performed by: INTERNAL MEDICINE

## 2021-06-10 PROCEDURE — 3E1M39Z IRRIGATION OF PERITONEAL CAVITY USING DIALYSATE, PERCUTANEOUS APPROACH: ICD-10-PCS | Performed by: INTERNAL MEDICINE

## 2021-06-10 PROCEDURE — 700102 HCHG RX REV CODE 250 W/ 637 OVERRIDE(OP): Performed by: NURSE PRACTITIONER

## 2021-06-10 PROCEDURE — 99232 SBSQ HOSP IP/OBS MODERATE 35: CPT | Performed by: HOSPITALIST

## 2021-06-10 PROCEDURE — 90945 DIALYSIS ONE EVALUATION: CPT

## 2021-06-10 PROCEDURE — A9270 NON-COVERED ITEM OR SERVICE: HCPCS | Performed by: NURSE PRACTITIONER

## 2021-06-10 PROCEDURE — 85610 PROTHROMBIN TIME: CPT

## 2021-06-10 PROCEDURE — 80069 RENAL FUNCTION PANEL: CPT

## 2021-06-10 RX ORDER — METOCLOPRAMIDE HYDROCHLORIDE 5 MG/ML
10 INJECTION INTRAMUSCULAR; INTRAVENOUS
Status: DISCONTINUED | OUTPATIENT
Start: 2021-06-10 | End: 2021-06-12

## 2021-06-10 RX ADMIN — POTASSIUM CHLORIDE 20 MEQ: 1500 TABLET, EXTENDED RELEASE ORAL at 05:24

## 2021-06-10 RX ADMIN — LEVOTHYROXINE SODIUM 150 MCG: 0.15 TABLET ORAL at 05:24

## 2021-06-10 RX ADMIN — OXYCODONE 5 MG: 5 TABLET ORAL at 21:15

## 2021-06-10 RX ADMIN — OMEPRAZOLE 40 MG: 20 CAPSULE, DELAYED RELEASE ORAL at 05:24

## 2021-06-10 RX ADMIN — GENTAMICIN SULFATE: 1 OINTMENT TOPICAL at 16:50

## 2021-06-10 RX ADMIN — ISOSORBIDE DINITRATE 30 MG: 30 TABLET ORAL at 05:24

## 2021-06-10 RX ADMIN — ALPRAZOLAM 0.25 MG: 0.25 TABLET ORAL at 21:15

## 2021-06-10 RX ADMIN — ONDANSETRON 4 MG: 2 INJECTION INTRAMUSCULAR; INTRAVENOUS at 19:50

## 2021-06-10 RX ADMIN — DOCUSATE SODIUM 50 MG AND SENNOSIDES 8.6 MG 1 TABLET: 8.6; 5 TABLET, FILM COATED ORAL at 18:14

## 2021-06-10 RX ADMIN — METOCLOPRAMIDE 10 MG: 5 INJECTION, SOLUTION INTRAMUSCULAR; INTRAVENOUS at 23:33

## 2021-06-10 RX ADMIN — METOCLOPRAMIDE 10 MG: 5 INJECTION, SOLUTION INTRAMUSCULAR; INTRAVENOUS at 18:25

## 2021-06-10 RX ADMIN — ISOSORBIDE DINITRATE 30 MG: 30 TABLET ORAL at 18:10

## 2021-06-10 RX ADMIN — ERYTHROMYCIN LACTOBIONATE 500 MG: 500 INJECTION, POWDER, LYOPHILIZED, FOR SOLUTION INTRAVENOUS at 23:33

## 2021-06-10 RX ADMIN — CARVEDILOL 6.25 MG: 6.25 TABLET, FILM COATED ORAL at 18:11

## 2021-06-10 RX ADMIN — ERYTHROMYCIN LACTOBIONATE 500 MG: 500 INJECTION, POWDER, LYOPHILIZED, FOR SOLUTION INTRAVENOUS at 18:23

## 2021-06-10 ASSESSMENT — ENCOUNTER SYMPTOMS
NAUSEA: 1
HALLUCINATIONS: 0
NECK PAIN: 0
FLANK PAIN: 0
DIARRHEA: 0
BLURRED VISION: 0
BLOOD IN STOOL: 0
HEADACHES: 0
FEVER: 0
LOSS OF CONSCIOUSNESS: 0
VOMITING: 1
SEIZURES: 0
FOCAL WEAKNESS: 0
CONSTIPATION: 0
PALPITATIONS: 0
WEIGHT LOSS: 1
COUGH: 0
HEARTBURN: 0
SORE THROAT: 0
DEPRESSION: 0
FALLS: 0
SHORTNESS OF BREATH: 0
STRIDOR: 0
MYALGIAS: 0
CHILLS: 0
NERVOUS/ANXIOUS: 0
ABDOMINAL PAIN: 0
DIZZINESS: 0

## 2021-06-10 ASSESSMENT — PAIN DESCRIPTION - PAIN TYPE: TYPE: ACUTE PAIN

## 2021-06-10 NOTE — PROGRESS NOTES
[facility] -  PROGRESS NOTE               Author: ASAEL Basilio.  Collaborating Physician: Dr. Rivas  Date & Time: 6/10/2021  9:41 AM     HPI:  67 yo M PMH ESRD CCPD, HTN, anemia of CKD, CKD-MBD, HLD, and CAD who presents to ED with CC as above.  He has chronic intermittent N/V that he's been battling for a long time.  It leads to wide fluctuations in his po intake.  He also has severe CAD and non-cardiac atherosclerosis.  Recently he feel she has lost 45 lbs in the past month due to the N/V.  He's had a CTA in past that has revealed SMA steosis, but report was not available here at this institution.  Repeat imaging ordered by admit MD and he was admitted for further evaluation.  Has no abd pain and otherwise denies any associated F/C/CP/SOB.  No melena, hematochezia, hematemesis.  No HA, visual changes.    DAILY NEPHROLOGY SUMMARY:  6/05 - Consult done  6/06 - NAEO, +N/V; Unable to keep majority of food down  6/07 - sitting up in bed, still having poor intake vomited this am, PD UF: 461ml, had UGI: WNL, had large BM this am   6/08: sitting up in bed, feels miserable, weak and nauseated, for EGD today, discussed case with Dr. Rivas, dont believe PD is contributing factor as pt BUN is only mildly elevated and pt has been on PD for > 1 year, if GI workup neg could consider transitioning to HD to see if helps elevate symptoms, tolerated PD with UF: 450ml  6/09: Ambulating in room. C/o continued N/V and dehydration. S/p EGD w/bx's of stomach/duodenum.  318 mL net UF. GES today.  6/10: Nauseous with small amount of emesis this am. Does not feel he can tolerate food with GES this am. Primary RN at . 473 Net UF CCPD.       REVIEW OF SYSTEMS:    10 point ROS reviewed and is as per HPI/daily summary or otherwise negative    PMH/PSH/SH/FH: Reviewed and unchanged since admission note  CURRENT MEDICATIONS: Reviewed from admission to present day    VS:  /69   Pulse 98   Temp 36.6  "°C (97.8 °F) (Temporal)   Resp 17   Ht 1.778 m (5' 10\")   Wt 50.5 kg (111 lb 5.3 oz) Comment: 3.8 prosthetic/shoes subtracted  SpO2 95%   BMI 15.97 kg/m²   Physical Exam  Nursing note reviewed.   Constitutional:       General: He is not in acute distress.     Appearance: Normal appearance. He is ill-appearing. He is not diaphoretic.      Comments: Cachectic    HENT:      Head: Normocephalic and atraumatic.   Neck:      Trachea: No tracheal tenderness.   Cardiovascular:      Rate and Rhythm: Normal rate.      Heart sounds: Murmur heard.   No friction rub. No gallop.       Comments: No edema  Pulmonary:      Effort: Pulmonary effort is normal. No respiratory distress.   Abdominal:      General: There is no distension.      Comments: PD Cath in place   Musculoskeletal:         General: No deformity.      Comments: L BKA w/prosthesis   Skin:     General: Skin is warm and dry.      Coloration: Skin is not jaundiced.      Findings: No rash.   Neurological:      Mental Status: He is alert and oriented to person, place, and time.      Motor: No atrophy.   Psychiatric:         Behavior: Behavior normal.     Fluids:  In: -   Out: 791     LABS:  Recent Labs     06/08/21  0550 06/09/21  0702 06/10/21  0442   SODIUM 134* 131* 129*   POTASSIUM 3.6 4.2 3.4*   CHLORIDE 91* 91* 89*   CO2 26 22 26   GLUCOSE 103* 95 93   BUN 53* 61* 62*   CREATININE 11.92* 11.75* 11.84*   CALCIUM 9.0 9.0 8.6       IMPRESSION:  # ESRD   - Etiology likely 2/2 HTN   - PD Rx: 9hrs tx time; 6 x 2.5L exchanges    * 2.5/1.5 % fluid; No last fill/no mid day exchange   - Maybe failing PD due to his GI issues, nutrition status is poor   - discussed case with Dr. Rivas, alant believe PD is contributing factor as pt BUN is only mildly elevated and pt has been on PD for > 1 year, if GI workup neg could consider transitioning to HD to see if helps eleviate symptoms  # N/V   - Etiology unclear   - Chronic intermittent issue   - Followed by GI and has had " extensive work up, upper GI: WNL , for EGD 6/8    - Bx stomach and duodenum pending    - Atherosclerosis of GI vasculature a possibility   - No evidence of stricture, no erosive gastritis   - GES today: gastroparesis vs. PD  # HTN, controlled    - Goal BP < 140/90   - Stable  # Anemia of CKD   - Goal Hgb 10-11   - Stable  # CKD-MBD   - managed at outpt clinic    - hold phos binder for now with N/V  # Hypokalemia, improved    - KCL IV x 1 6/6   - continue PO KCL 20meq daily   # Hyponatremia, mild   - UF with HD as tolerated      PLAN:  - Daily CCPD  - Adjust Rx of PD as needed  - May need to transition to HD, will need HD access   - No dietary protein restrictions  - Dose all meds per ESRD  - Regular diet ok  - No fluid restrictions at this time  - Nepro with meals  - hold phos binders   - Okay to bring Liquacel Protein supplement from home   - continue PO KCL daily

## 2021-06-10 NOTE — PROGRESS NOTES
[facility] all 1.5% solution. PDcath. Intact. Dressing changed, gentamycin oint. Applied to cath. Entry site. No S/S of infection noted. Report and in-service provided to primary RN. See flow sheet for details.

## 2021-06-10 NOTE — CARE PLAN
"  Problem: Nutritional:  Goal: Achieve adequate nutritional intake  Description: Patient will consume >50% of meals + supplements.  Outcome: Not Progressing   Diet just advanced to Cardiac. Pt was NPO this morning for gastric emptying study. Has been intermittently NPO throughout admit. Pt did eat % x2 meals on 6/7 per flowsheets.     Update: RD received call from nutrition representative stating that pt does not feel well and requesting to not receive food or discuss menu options.   RD visited pt at bedside. Pt noted that he is continuing to have trouble keeping food down. Complained of dislike for foods on cardiac diet, noted that per nephrologist is ok to not have a sodium restriction. RD noted pt with a cup of juice at bedside - pt reported tolerance of juice, however, did complain of a \"mucus\" in his throat after drinking. Reported dislike of Boost Plus supplements - agreeable to trial Boost Breeze, which is more of a juice-type drink. Pt requesting 1/2 roast beef sandwich for dinner.   Pt also noted drinking 1 oz of \"Liquacel\" QD, which provides 16 g pro and 100 kcal.     RD adjusted supplements, snacks, and communicated with nutrition representative/dietary staff regarding pt preferences.     RD continues to follow.    "

## 2021-06-10 NOTE — PROGRESS NOTES
Inpatient Anticoagulation Service Note    Date: 6/10/2021  Reason for Anticoagulation: Other (Comments) (PVD)     Hemoglobin Value: (!) 10.2  Hematocrit Value: (!) 32.3  Lab Platelet Value: (!) 128  Target INR: 2.0 to 3.0    INR from last 7 days     Date/Time INR Value    06/10/21 04:42:01  (!) 3.23    06/09/21 0702  (!) 2.98    06/08/21 0550  (!) 3.31    06/07/21 06:25:01  (!) 3.33    06/06/21 07:30:01  (!) 2.73    06/05/21 0744  (!) 2.31        Dose from last 7 days     Date/Time Dose (mg)    06/10/21 1249  0    06/09/21 1324  1.25    06/07/21 1206  1.25    06/06/21 1246  2.5    06/05/21 1212  2.5    06/04/21 2152  2.5        Average Dose (mg):  (Home dose: warfarin 2.5mg po daily)  Significant Interactions: Antiplatelet Medications, Aspirin, Statin, Thyroid Medications, Proton Pump Inhibitor  Bridge Therapy: No  Reversal Agent Administered: Not Applicable    Comments: Supratherapeutic INR today. Will hold a dose today and reassess INR tomorrow. H/H low but no s/sx of bleeding. NPO status for GES today.     Plan: Hold warfarin today  Education Material Provided?: No  Pharmacist suggested discharge dosing: tentatively plan on continuing home dose of 2.5mg po daily with repeat INR within 2-3 days of discharge.      Triny Hall, PharmD

## 2021-06-10 NOTE — THERAPY
Missed Therapy     Patient Name: Francis Purvis  Age:  66 y.o., Sex:  male  Medical Record #: [id number]  Today's Date: 6/10/2021    Discussed missed therapy with RN    Met with pt at bedside and pt declining out of bed at this time due to feeling very weak and nauseous, heading to procedure shortly as well. Pt agreeable to PT checking back tomorrow. Will follow up as able.

## 2021-06-10 NOTE — PROGRESS NOTES
"  Gastroenterology (GIC) Progress Note     Author: Nilson SmallSURESH  Date & Time Created: 6/10/2021 11:11 AM    Chief Complaint:  Nausea and vomiting    Interval History:  6/9/2021: Feels the same, still with nausea and vomiting. Denied abdominal pain.  Stools are semi-solid but not liquid.  Denied hematochezia or melena.  Denied further modifying factors, associated symptoms or timing issues.    6/10/2021: Stable, slightly less vomiting. No abdominal pain. Gastric emptying study to be done today.     History of Present Illness per consult note:    \"He is a 66-year-old male patient seen in consultation for nausea, vomiting, weight loss.  The patient was admitted on 6/4/2021 with complaints of nausea and vomiting with poor oral intake and weight loss.  The patient states that over the last 4 to 6 weeks he has had problems with intermittent nausea, vomiting that can occur randomly.  He states that if he vomits, often it will appear liquid and bilious in color.  However, at times he can regurgitate liquid or food from his stomach.  He cannot accurately tell me how often this occurs despite several questions asked in different ways about his vomiting.  It does sound like at times he can have food that sticks in his midesophagus when swallowing that he can regurgitate such as solid food, but this is not consistent.  He does not seem to have problems with liquids or the swallowing mechanism.  He states he is lost 35 to 45 pounds over the last month to month and a half.  He denies abdominal pain, fevers, chills, melena, hematochezia.  He denies GERD or heartburn symptoms.  The patient had similar symptoms of nausea and vomiting 2019 seen by Dr. Butch Hunter with [facility].  EGD performed by Dr. Scott Arciniega at that time demonstrated hiatal hernia and no other abnormalities.  Prior to that the patient did have what sounds to be bleeding esophageal and gastric ulcers, although I do not have primary data of this.  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  He also underwent previous left below the knee amputation related to his vascular disease.  He also at one point had imaging summary that suggested possible superior mesenteric artery stenosis, but the patient tells me that vascular surgery saw him and felt that he did not have this condition and no intervention was done at that time.     Labs and imaging reviewed.  CBC consistent with RBC 12.0, hematocrit 22.0, RDW 63.1, platelets 151.  Sed rate is normal 6/4/2021.  CMP with sodium 132, potassium 2.8, chloride 89, creatinine 12.70.  LFT 6/4/2021 are normal.  INR today 3.33  on Coumadin.  CT abdomen and pelvis with contrast on 6/4/2021 demonstrated no acute abdominal or pelvic findings.  SMA and celiac axis noted to be patent, although contrast is difficult to confirm within the diminutive and markedly atherosclerotic renal arteries.  Upper GI series with KUB demonstrated normal-appearing esophagus without stricture, esophagitis, or reflux.  Stomach and duodenum also appeared normal.\"       Review of Systems:  Review of Systems   Constitutional: Positive for weight loss. Negative for chills and fever.   HENT: Negative for sore throat.    Respiratory: Negative for cough, shortness of breath and stridor.    Cardiovascular: Negative for chest pain and palpitations.   Gastrointestinal: Positive for nausea and vomiting. Negative for blood in stool, constipation, diarrhea and melena.   Genitourinary: Negative for flank pain and hematuria.   Musculoskeletal: Negative for falls and neck pain.   Skin: Negative for itching and rash.   Neurological: Negative for seizures and loss of consciousness.   Psychiatric/Behavioral: Negative for hallucinations. The patient is not nervous/anxious.    All other systems reviewed and are " negative.      Physical Exam:  Physical Exam  Vitals and nursing note reviewed.   Constitutional:       General: He is not in acute distress.     Appearance: Normal appearance. He is ill-appearing. He is not toxic-appearing or diaphoretic.   HENT:      Head: Normocephalic and atraumatic.      Mouth/Throat:      Mouth: Mucous membranes are moist.      Pharynx: Oropharynx is clear. No posterior oropharyngeal erythema.   Eyes:      General: No scleral icterus.     Extraocular Movements: Extraocular movements intact.      Conjunctiva/sclera: Conjunctivae normal.      Pupils: Pupils are equal, round, and reactive to light.   Cardiovascular:      Rate and Rhythm: Normal rate and regular rhythm.      Pulses: Normal pulses.      Heart sounds: Normal heart sounds.   Pulmonary:      Effort: Pulmonary effort is normal. No respiratory distress.      Breath sounds: Normal breath sounds. No stridor.   Abdominal:      General: Abdomen is flat. Bowel sounds are normal. There is no distension.      Palpations: Abdomen is soft.      Tenderness: There is no abdominal tenderness. There is no rebound.      Comments: Peritoneal dialysis catheter   Musculoskeletal:         General: Deformity present. Normal range of motion.      Cervical back: Normal range of motion and neck supple.      Comments: Left BKA   Skin:     General: Skin is warm and dry.      Coloration: Skin is not jaundiced or pale.   Neurological:      General: No focal deficit present.      Mental Status: He is alert and oriented to person, place, and time.      Coordination: Coordination normal.   Psychiatric:         Thought Content: Thought content normal.         Judgment: Judgment normal.         Labs:          Recent Labs     06/08/21  0550 06/09/21  0702 06/10/21  0442   SODIUM 134* 131* 129*   POTASSIUM 3.6 4.2 3.4*   CHLORIDE 91* 91* 89*   CO2 26 22 26   BUN 53* 61* 62*   CREATININE 11.92* 11.75* 11.84*   PHOSPHORUS  --   --  4.9*   CALCIUM 9.0 9.0 8.6     Recent  Labs     21  0550 21  0702 06/10/21  0442   ALTSGPT 12 46  --    ASTSGOT 24 62*  --    ALKPHOSPHAT 103* 114*  --    TBILIRUBIN 0.7 0.7  --    GLUCOSE 103* 95 93     Recent Labs     21  0550 21  0702 06/10/21  0442   RBC  --  3.68* 3.18*   HEMOGLOBIN  --  12.3* 10.2*   HEMATOCRIT  --  37.8* 32.3*   PLATELETCT  --  192 128*   PROTHROMBTM 34.7* 31.9* 34.0*   INR 3.31* 2.98* 3.23*     Recent Labs     21  0550 21  0702 06/10/21  0442   WBC  --  11.2* 7.2   NEUTSPOLYS  --  80.20* 67.40   LYMPHOCYTES  --  9.90* 20.70*   MONOCYTES  --  8.60 9.90   EOSINOPHILS  --  0.50 1.30   BASOPHILS  --  0.40 0.30   ASTSGOT 24 62*  --    ALTSGPT 12 46  --    ALKPHOSPHAT 103* 114*  --    TBILIRUBIN 0.7 0.7  --      Hemodynamics:  Temp (24hrs), Av.4 °C (97.5 °F), Min:35.8 °C (96.5 °F), Max:36.8 °C (98.3 °F)  Temperature: 36.6 °C (97.8 °F)  Pulse  Av.4  Min: 57  Max: 104   Blood Pressure : 124/69     Respiratory:    Respiration: 17, Pulse Oximetry: 95 %           Fluids:    Intake/Output Summary (Last 24 hours) at 2021 0906  Last data filed at 2021 0715  Gross per 24 hour   Intake 125 ml   Output 358 ml   Net -233 ml        GI/Nutrition:  Orders Placed This Encounter   Procedures   • Diet NPO     Standing Status:   Standing     Number of Occurrences:   8     Order Specific Question:   Restrict to:     Answer:   Sips with Medications [3]     Medical Decision Making, by Problem:  Active Hospital Problems    Diagnosis    • *Failure to thrive in adult [R62.7]    • Systolic CHF, chronic (HCC) [I50.22]    • Superior mesenteric artery stenosis (HCC) [K55.1]    • Severe protein-calorie malnutrition (HCC) [E43]    • PVD (peripheral vascular disease) (HCC) [I73.9]    • Anxiety [F41.9]    • ACP (advance care planning) [Z71.89]    • Non-rheumatic mitral regurgitation [I34.0]    • Hypothyroid [E03.9]    • CAD (coronary artery disease) [I25.10]    • Mixed hyperlipidemia [E78.2]    • Essential  hypertension [I10]    • Anemia in chronic kidney disease (CKD) [N18.9, D63.1]    • ESRD on peritoneal dialysis (HCC) [N18.6, Z99.2]    • C. difficile colitis [A04.72]    • Intractable nausea and vomiting [R11.2]    • Atherosclerosis of right lower extremity with rest pain (HCC) [I70.221]    • Status post below knee amputation of left lower extremity [Z89.512]    • Testicular cancer ([facility]) [C62.90]      Problems:  1.  Nausea and vomiting, no evidence of stricture by EGD; cortisol was normal on 6/4/2021  2.  Non-erosive gastritis, biopsies negative for active inflammation or H. Pylori  3.  Weight loss, duodenal biopsy negative for celiac sprue  4.  Protein calorie malnutrition  5.  Gastric polyps pathology consistent with fundic gland polyps  6.  Peripheral vascular disease status post previous bypass surgery and left below the knee amputation currently on Coumadin  7.  End-stage renal disease on peritoneal dialysis  8.  History of possible superior mesenteric artery stenosis-no current CT evidence of this  9.  Hyperlipidemia  10.  Hypothyroidism  11.  Coronary artery disease  12.  Macrocytic anemia in chronic kidney disease  13.  COVID negative on 6/4  14.  Complex patient with multiple medical comorbidities with increased complexity medical decision making    Recommendations:  1.  Continue prilosec to 40mg QD  2.  unable to tolerate GES due to vomiting so changed reglan to IV and increased dosage as well started IV erythromycin x 3 doses.  This will maximize treatment for presumed gastroparesis.  Warned patient of risks of tardive dyskinesia with reglan in layman's terms and he wishes to proceed with treatment and accepted risks.    3.  If nausea and vomiting are not controlled with medication changes then will need to consider removing peritoneal dialysis catheter and pursuing hemodialysis instead.  4.  Will need close follow-up with established GI Deidra Madrigal or Dr. Hunter as an outpatient.       Quality-Core  Measures

## 2021-06-10 NOTE — PROGRESS NOTES
CCPD disconnected aseptically at 0530. Pt stable, VSS, alert and oriented. Procedure explained to pt and pt verbalized understanding.      PD exit site CDI with gauze dressing in place.      Effluent clear yellow with no fibrin noted.      24 hour UF: 473 (470mL total UF + 3mL initial drain - 0mL last fill)      Report given to Kota Aguirre RN.

## 2021-06-10 NOTE — CARE PLAN
Problem: Fluid Volume  Goal: Fluid volume balance will be maintained  Outcome: Progressing     Problem: Urinary - Renal Perfusion  Goal: Ability to achieve and maintain adequate renal perfusion and functioning will improve  Outcome: Progressing   The patient is Stable - Low risk of patient condition declining or worsening    Shift Goals: peritoneal dialysis  Clinical Goals: nutrition; peritoneal dialysis  Patient Goals: rest    Progress made toward(s) clinical / shift goals:  Patient currently doing dialysis, ensure machine is working and dwell times are as scheduled    Patient is not progressing towards the following goals:

## 2021-06-10 NOTE — PROGRESS NOTES
[facility] Medicine Daily Progress Note    Date of Service  6/10/2021    Chief Complaint  66 y.o. male presented 6/4/2021 with N/V, unable to tolerate po and constipation    Hospital Course  Per Dr. Kaye's HPI:  66 y.o. M with ESRD on CCPD, CAD s/p multiple stents, valvular heart disease, HTN, HLD presented 6/4/2021 to [facility] with complaint of nausea, vomiting poor oral intake and weight loss of 45 pounds over the past month; then subsequently transferred to [facility] as a divert on the same day.  It was noted that he had CTA abdominal pelvis revealed SMA stenosis. He reported having history of C. difficile in April 2020, has not had diarrhea over the past month and reported to have negative C. difficile study a week ago. Upon admission, patient appeared comfortable with no deranged vital signs.  Abdominal exam is benign, doubt he has SMA stenosis given benign exam and on warfarin with therapeutic INR.  Additionally, he does take Effient in addition to warfarin for his vascular disease.  However, CTAP with contrast was ordered on admisison to ensure no SMA thrombosis.  Patient was admitted to medicine service for further evaluation and treatment.    6/5: vitals wnl; afebrile. No pain reported.   [facility] Nephrology Dr. Cantrell - knows this patient well. Peritoneal HD restarted.  During rounds, tolerated oral diet and reported feeling better. Refused suppository and wanted to try oral laxative first.   CT abd/pelvis with: the SMA and celiac axis are noted to be patent;  No acute abdominal or pelvic findings. CXR: no acute cardiopulm disease  US ABd: No sonographic signs of acute cholecystitis.    6/6: vitals and exam grossly unremarkable   At bedside, a bit discouraged. Lunch/dinner was late plus peritoneal HD was early. With all these changes in routine, he vomited lunch when Miralax was given. Current challenges with inability to tolerate po, N/V and severe constipation discussed - this has happened  multiple times. Previously resolved on his own. Early diet tray ordered. He agreed for suppository and UGI series done - grossly unremarkable.   CT chest - some nodules noted (inflammation, infection vs scarring) - advised to follow up.       Interval Problem Update  6/7  Vitals reviewed; afebrile.  The rest of the vitals within normal parameters.  Pain scale reported - none  Blood glucose in last 24hrs - around 70s  I/O - Still have poor oral intake; vomited early this AM; finally had a small BM 6/6 PM    At bedside, no acute complain but reports of still vomiting and inability to tolerate po. Had a bite of breakfast and nausea (despite having zofran prior to his meal).     We discussed about Abd/Pel CT and UGI series - no evidence of blockage at this point. Reglan has also been added for possible motility issue. He has been having small BM since yesterday. Additional meds like haldol prn available for N/V. Unclear at this point how PD and mental aspect are affecting his current issues with failure to thrive.     [facility] - we discussed about possibly having GI team evaluate.     Labs reviewed  Na 132, Cr 12.7  K 2.8 (being supplemented)  WBC 7.4, Plt 151  INR 3.33    Echo: Severely reduced left ventricular systolic function. Left ventricular ejection fraction is visually estimated to be 30%. Severe mitral regurgitation. Moderate aortic insufficiency. RVSP 45    UGI series with KUB - unremarkable upper GI exam    6/8 patient is resting in bed, he feels weak, he is n.p.o. for EGD today, denies nausea or vomiting.  6/9 In bed drinking sips of water, no fever or chills, GES pending, no fever or chills alert and oriented still feels weak.   6/10 continues to feel weak, no fever or chills, GES today could be tolerated patient vomited, will f/u with GI. Uremia related? Might need to change to HD.     Consultants/Specialty  Nephrology  GI    Code Status  Full Code    Disposition  To be  determined      Review of Systems  Review of Systems   Constitutional: Positive for weight loss. Negative for chills and fever.   HENT: Negative for sore throat.    Eyes: Negative for blurred vision.   Respiratory: Negative for cough.    Cardiovascular: Negative for chest pain and palpitations.   Gastrointestinal: Positive for nausea and vomiting. Negative for abdominal pain, constipation and heartburn.   Genitourinary: Negative for dysuria, frequency and urgency.   Musculoskeletal: Negative for myalgias and neck pain.        Nerve pain in right leg   Neurological: Negative for dizziness, focal weakness and headaches.   Psychiatric/Behavioral: Negative for depression.        Physical Exam  Temp:  [35.8 °C (96.5 °F)-36.8 °C (98.3 °F)] 36.6 °C (97.8 °F)  Pulse:  [] 98  Resp:  [16-17] 17  BP: (115-135)/(67-72) 124/69  SpO2:  [90 %-97 %] 95 %    Physical Exam  Vitals and nursing note reviewed.   Constitutional:       General: He is not in acute distress.     Appearance: Normal appearance. He is not ill-appearing.      Comments: Cachetic   HENT:      Head: Normocephalic and atraumatic.      Mouth/Throat:      Mouth: Mucous membranes are moist.      Pharynx: Oropharynx is clear.   Eyes:      Conjunctiva/sclera: Conjunctivae normal.   Cardiovascular:      Rate and Rhythm: Normal rate and regular rhythm.      Pulses: Normal pulses.      Heart sounds: Murmur heard.     Pulmonary:      Effort: Pulmonary effort is normal. No respiratory distress.      Breath sounds: Normal breath sounds. No rales.   Abdominal:      General: Bowel sounds are normal. There is no distension.      Palpations: Abdomen is soft.      Comments: Peritoneal catheter noted (C/D/I)   Musculoskeletal:         General: No swelling or tenderness. Normal range of motion.      Comments: Left BKA stump - C/D/I   Skin:     General: Skin is warm and dry.      Capillary Refill: Capillary refill takes less than 2 seconds.      Coloration: Skin is not  jaundiced.   Neurological:      General: No focal deficit present.      Mental Status: He is alert and oriented to person, place, and time.   Psychiatric:         Mood and Affect: Mood normal.         Fluids    Intake/Output Summary (Last 24 hours) at 6/10/2021 1245  Last data filed at 6/10/2021 0530  Gross per 24 hour   Intake --   Output 473 ml   Net -473 ml       Laboratory  Recent Labs     06/09/21  0702 06/10/21  0442   WBC 11.2* 7.2   RBC 3.68* 3.18*   HEMOGLOBIN 12.3* 10.2*   HEMATOCRIT 37.8* 32.3*   .7* 101.6*   MCH 33.4* 32.1   MCHC 32.5* 31.6*   RDW 63.4* 61.4*   PLATELETCT 192 128*   MPV 10.2 10.8     Recent Labs     06/08/21  0550 06/09/21  0702 06/10/21  0442   SODIUM 134* 131* 129*   POTASSIUM 3.6 4.2 3.4*   CHLORIDE 91* 91* 89*   CO2 26 22 26   GLUCOSE 103* 95 93   BUN 53* 61* 62*   CREATININE 11.92* 11.75* 11.84*   CALCIUM 9.0 9.0 8.6     Recent Labs     06/08/21  0550 06/09/21  0702 06/10/21  0442   INR 3.31* 2.98* 3.23*               Imaging  DX-UPPER GI-SERIES WITH KUB   Final Result      1.  Unremarkable upper GI exam.         EC-ECHOCARDIOGRAM COMPLETE W/O CONT   Final Result      CT-CHEST (THORAX) W/O   Final Result      1.  Abnormal lung parenchyma      2.  Specifically, there are multifocal bilateral airspace process, 14 mm right middle lobe groundglass density, posterior aspect right lower lobe 12 mm area of spiculation and probably underlying emphysema.      3.  Pulmonary findings are nonspecific and could be inflammation, infection, or postinflammatory scarring. Neoplasm is less likely.      4.  Dense aortic and branch vessel atherosclerotic plaque      5.  Pulmonary artery hypertension      6.  Very small bilateral pleural effusion      Fleischner Society pulmonary nodule recommendations:      Ground Glass and Part Solid: No routine follow-up      Comments: In certain suspicious nodules less than 6 mm, consider follow-up at 2 and 4 years. If solid component(s) or growth develops,  consider resection.      Note: These recommendations do not apply to lung cancer screening, patients with immunosuppression, or patients with known primary cancer.      Fleischner Society 2017 Guidelines for Management of Incidentally Detected Pulmonary Nodules in Adults      OUTSIDE IMAGES-US ABDOMEN   Final Result      OUTSIDE IMAGES-DX CHEST   Final Result      CT-ABDOMEN-PELVIS WITH   Final Result   Addendum 1 of 1   On further review, the SMA and celiac axis are noted to be patent.    Contrast is difficult to confirm within the diminutive and markedly    atherosclerotic renal arteries.      Final      1.  No acute abdominal or pelvic findings.   2.  Bilaterally small atrophic appearing kidneys consistent with renal failure, with a dialysis catheter noted in the pelvic cavity.   3.  Colonic diverticulosis without diverticulitis.      NM-GASTRIC EMPTYING    (Results Pending)        Assessment/Plan  * Intractable nausea and vomiting  Assessment & Plan  Antiemetic  Supportive care  No acute etiology seen on CTAP  Could be back up from constipation - has now started to have some BM    EGD did not show source of his symptoms  GES could not tolerate test.   Will discuss with GI might need to start erythromycin and increase reglan.     Systolic CHF, chronic (HCC)- (present on admission)  Assessment & Plan  No clinic signs of fluid overload or decompensation  C/w home regimen    ACP (advance care planning)  Assessment & Plan  Plan of care and rationale for hospitalization discussed on admission  FULL code status confirmed      Anxiety  Assessment & Plan  PRN Xanax    PVD (peripheral vascular disease) (HCC)  Assessment & Plan  S/p left BKA  Former smoker  H/o vein graft  Cont Warfarin/Effient/statin    Severe protein-calorie malnutrition (HCC)  Assessment & Plan  PO intake as tolerated  Ensure  RD consult    Failure to thrive in adult  Assessment & Plan  Reported weight loss of 45 pounds over the past few months due to  poor oral intake  CT abd/pelvis with no evidence of mass or tumor  CT chest - noted nodules (will need to follow up outpatient); may be a candidate for possible outpatient colonoscopy   RD consult  Current challenges with inability to tolerate po, N/V and severe constipation    We discussed about Abd/Pel CT and UGI series - no evidence of blockage at this point. Reglan has also been added for possible motility issue. He has been having small BM since yesterday. Additional meds like haldol prn available for N/V.     Unclear at this point how PD and mental aspect are affecting his current issues with failure to thrive.     [facility] is known to [facility] - we discussed about possibly having GI team evaluate.   PT OT            Superior mesenteric artery stenosis (HCC)  Assessment & Plan  Transferred for concern for SMA thrombosis -- reported to have +finding on CT AP previously - however, no report seen at [facility] Arrival    CT AP at [facility]: no SMA thrombosis  INR     Non-rheumatic mitral regurgitation- (present on admission)  Assessment & Plan  Severe with low EF  No clinical signs of fluid overload  Following Cardiology outpatient    CAD (coronary artery disease)- (present on admission)  Assessment & Plan  Warfarin/Effient/BB/statin  Isodil  Ranexa for angina      Essential hypertension- (present on admission)  Assessment & Plan  Coreg    Anemia in chronic kidney disease (CKD)- (present on admission)  Assessment & Plan  Chronic, no gross bleeding    C. difficile colitis  Assessment & Plan  H/o  Reported having negative result a week prior  No WBC, Cr elevated due to ESRD  No toxic swetha colon seen on CT AP  No diarrhea so far    ESRD on peritoneal dialysis (HCC)- (present on admission)  Assessment & Plan  Continue PD  Continue phosphate binders  Nephrology consulted for assistance with care  Stable  Might need to changed to HD.     Status post below knee amputation of left lower extremity- (present on  admission)  Assessment & Plan  S/p left BKA  PT/OT    Mixed hyperlipidemia- (present on admission)  Assessment & Plan  Statin    Hypothyroid- (present on admission)  Assessment & Plan  Synthroid       VTE prophylaxis: on coumadin (INR being followed closely)    Case and plan of care discussed with nurse staff and during multidisciplinary rounds

## 2021-06-11 ENCOUNTER — APPOINTMENT (OUTPATIENT)
Dept: RADIOLOGY | Facility: MEDICAL CENTER | Age: 66
DRG: 291 | End: 2021-06-11
Attending: INTERNAL MEDICINE
Payer: MEDICARE

## 2021-06-11 PROBLEM — R94.31 PROLONGED QT INTERVAL: Status: ACTIVE | Noted: 2021-06-11

## 2021-06-11 LAB
ALBUMIN SERPL BCP-MCNC: 2.5 G/DL (ref 3.2–4.9)
ALBUMIN SERPL BCP-MCNC: 2.9 G/DL (ref 3.2–4.9)
ANION GAP SERPL CALC-SCNC: 13 MMOL/L (ref 7–16)
BASOPHILS # BLD AUTO: 0.3 % (ref 0–1.8)
BASOPHILS # BLD: 0.03 K/UL (ref 0–0.12)
BUN SERPL-MCNC: 63 MG/DL (ref 8–22)
BUN SERPL-MCNC: 64 MG/DL (ref 8–22)
CALCIUM SERPL-MCNC: 8.3 MG/DL (ref 8.5–10.5)
CALCIUM SERPL-MCNC: 8.5 MG/DL (ref 8.5–10.5)
CHLORIDE SERPL-SCNC: 92 MMOL/L (ref 96–112)
CHLORIDE SERPL-SCNC: 94 MMOL/L (ref 96–112)
CO2 SERPL-SCNC: 24 MMOL/L (ref 20–33)
CO2 SERPL-SCNC: 26 MMOL/L (ref 20–33)
CREAT SERPL-MCNC: 11.38 MG/DL (ref 0.5–1.4)
CREAT SERPL-MCNC: 12.6 MG/DL (ref 0.5–1.4)
EKG IMPRESSION: NORMAL
EOSINOPHIL # BLD AUTO: 0.14 K/UL (ref 0–0.51)
EOSINOPHIL NFR BLD: 1.3 % (ref 0–6.9)
ERYTHROCYTE [DISTWIDTH] IN BLOOD BY AUTOMATED COUNT: 61.8 FL (ref 35.9–50)
GLUCOSE SERPL-MCNC: 76 MG/DL (ref 65–99)
GLUCOSE SERPL-MCNC: 89 MG/DL (ref 65–99)
HCT VFR BLD AUTO: 32.9 % (ref 42–52)
HGB BLD-MCNC: 10.6 G/DL (ref 14–18)
IMM GRANULOCYTES # BLD AUTO: 0.06 K/UL (ref 0–0.11)
IMM GRANULOCYTES NFR BLD AUTO: 0.5 % (ref 0–0.9)
INR PPP: 2.6 (ref 0.87–1.13)
LYMPHOCYTES # BLD AUTO: 1.64 K/UL (ref 1–4.8)
LYMPHOCYTES NFR BLD: 14.8 % (ref 22–41)
MAGNESIUM SERPL-MCNC: 2.5 MG/DL (ref 1.5–2.5)
MAGNESIUM SERPL-MCNC: 2.5 MG/DL (ref 1.5–2.5)
MCH RBC QN AUTO: 33.2 PG (ref 27–33)
MCHC RBC AUTO-ENTMCNC: 32.2 G/DL (ref 33.7–35.3)
MCV RBC AUTO: 103.1 FL (ref 81.4–97.8)
MONOCYTES # BLD AUTO: 0.92 K/UL (ref 0–0.85)
MONOCYTES NFR BLD AUTO: 8.3 % (ref 0–13.4)
NEUTROPHILS # BLD AUTO: 8.26 K/UL (ref 1.82–7.42)
NEUTROPHILS NFR BLD: 74.8 % (ref 44–72)
NRBC # BLD AUTO: 0 K/UL
NRBC BLD-RTO: 0 /100 WBC
PHOSPHATE SERPL-MCNC: 4.9 MG/DL (ref 2.5–4.5)
PHOSPHATE SERPL-MCNC: 5.1 MG/DL (ref 2.5–4.5)
PLATELET # BLD AUTO: 152 K/UL (ref 164–446)
PMV BLD AUTO: 11.1 FL (ref 9–12.9)
POTASSIUM SERPL-SCNC: 4 MMOL/L (ref 3.6–5.5)
POTASSIUM SERPL-SCNC: 4.1 MMOL/L (ref 3.6–5.5)
PROTHROMBIN TIME: 27 SEC (ref 12–14.6)
RBC # BLD AUTO: 3.19 M/UL (ref 4.7–6.1)
SODIUM SERPL-SCNC: 131 MMOL/L (ref 135–145)
SODIUM SERPL-SCNC: 132 MMOL/L (ref 135–145)
WBC # BLD AUTO: 11.1 K/UL (ref 4.8–10.8)

## 2021-06-11 PROCEDURE — 770020 HCHG ROOM/CARE - TELE (206)

## 2021-06-11 PROCEDURE — 99233 SBSQ HOSP IP/OBS HIGH 50: CPT | Performed by: HOSPITALIST

## 2021-06-11 PROCEDURE — A9270 NON-COVERED ITEM OR SERVICE: HCPCS | Performed by: HOSPITALIST

## 2021-06-11 PROCEDURE — 700117 HCHG RX CONTRAST REV CODE 255: Performed by: INTERNAL MEDICINE

## 2021-06-11 PROCEDURE — 36415 COLL VENOUS BLD VENIPUNCTURE: CPT

## 2021-06-11 PROCEDURE — 700102 HCHG RX REV CODE 250 W/ 637 OVERRIDE(OP): Performed by: HOSPITALIST

## 2021-06-11 PROCEDURE — 700102 HCHG RX REV CODE 250 W/ 637 OVERRIDE(OP): Performed by: STUDENT IN AN ORGANIZED HEALTH CARE EDUCATION/TRAINING PROGRAM

## 2021-06-11 PROCEDURE — C1894 INTRO/SHEATH, NON-LASER: HCPCS

## 2021-06-11 PROCEDURE — A9270 NON-COVERED ITEM OR SERVICE: HCPCS | Performed by: NURSE PRACTITIONER

## 2021-06-11 PROCEDURE — 83735 ASSAY OF MAGNESIUM: CPT

## 2021-06-11 PROCEDURE — A9270 NON-COVERED ITEM OR SERVICE: HCPCS | Performed by: STUDENT IN AN ORGANIZED HEALTH CARE EDUCATION/TRAINING PROGRAM

## 2021-06-11 PROCEDURE — 77001 FLUOROGUIDE FOR VEIN DEVICE: CPT

## 2021-06-11 PROCEDURE — 700105 HCHG RX REV CODE 258: Performed by: HOSPITALIST

## 2021-06-11 PROCEDURE — 700105 HCHG RX REV CODE 258: Performed by: INTERNAL MEDICINE

## 2021-06-11 PROCEDURE — 700102 HCHG RX REV CODE 250 W/ 637 OVERRIDE(OP): Performed by: NURSE PRACTITIONER

## 2021-06-11 PROCEDURE — 700102 HCHG RX REV CODE 250 W/ 637 OVERRIDE(OP): Performed by: INTERNAL MEDICINE

## 2021-06-11 PROCEDURE — 700101 HCHG RX REV CODE 250: Performed by: RADIOLOGY

## 2021-06-11 PROCEDURE — 93005 ELECTROCARDIOGRAM TRACING: CPT | Performed by: HOSPITALIST

## 2021-06-11 PROCEDURE — 80069 RENAL FUNCTION PANEL: CPT

## 2021-06-11 PROCEDURE — 90945 DIALYSIS ONE EVALUATION: CPT

## 2021-06-11 PROCEDURE — 05HY33Z INSERTION OF INFUSION DEVICE INTO UPPER VEIN, PERCUTANEOUS APPROACH: ICD-10-PCS | Performed by: RADIOLOGY

## 2021-06-11 PROCEDURE — A9270 NON-COVERED ITEM OR SERVICE: HCPCS | Performed by: INTERNAL MEDICINE

## 2021-06-11 PROCEDURE — 85610 PROTHROMBIN TIME: CPT

## 2021-06-11 PROCEDURE — 700101 HCHG RX REV CODE 250: Performed by: STUDENT IN AN ORGANIZED HEALTH CARE EDUCATION/TRAINING PROGRAM

## 2021-06-11 PROCEDURE — 93010 ELECTROCARDIOGRAM REPORT: CPT | Performed by: INTERNAL MEDICINE

## 2021-06-11 PROCEDURE — 700111 HCHG RX REV CODE 636 W/ 250 OVERRIDE (IP): Performed by: INTERNAL MEDICINE

## 2021-06-11 PROCEDURE — 700111 HCHG RX REV CODE 636 W/ 250 OVERRIDE (IP): Performed by: STUDENT IN AN ORGANIZED HEALTH CARE EDUCATION/TRAINING PROGRAM

## 2021-06-11 PROCEDURE — 99153 MOD SED SAME PHYS/QHP EA: CPT

## 2021-06-11 PROCEDURE — 700101 HCHG RX REV CODE 250

## 2021-06-11 PROCEDURE — 700111 HCHG RX REV CODE 636 W/ 250 OVERRIDE (IP)

## 2021-06-11 PROCEDURE — 85025 COMPLETE CBC W/AUTO DIFF WBC: CPT

## 2021-06-11 PROCEDURE — 0JH63XZ INSERTION OF TUNNELED VASCULAR ACCESS DEVICE INTO CHEST SUBCUTANEOUS TISSUE AND FASCIA, PERCUTANEOUS APPROACH: ICD-10-PCS | Performed by: RADIOLOGY

## 2021-06-11 RX ORDER — SODIUM CHLORIDE 9 MG/ML
500 INJECTION, SOLUTION INTRAVENOUS
Status: DISCONTINUED | OUTPATIENT
Start: 2021-06-11 | End: 2021-06-11 | Stop reason: HOSPADM

## 2021-06-11 RX ORDER — WARFARIN SODIUM 2 MG/1
2 TABLET ORAL DAILY
Status: DISCONTINUED | OUTPATIENT
Start: 2021-06-11 | End: 2021-06-11

## 2021-06-11 RX ORDER — SODIUM CHLORIDE 9 MG/ML
500 INJECTION, SOLUTION INTRAVENOUS ONCE
Status: COMPLETED | OUTPATIENT
Start: 2021-06-11 | End: 2021-06-11

## 2021-06-11 RX ORDER — SODIUM CHLORIDE 9 MG/ML
250 INJECTION, SOLUTION INTRAVENOUS
Status: COMPLETED | OUTPATIENT
Start: 2021-06-11 | End: 2021-06-12

## 2021-06-11 RX ORDER — WARFARIN SODIUM 1 MG/1
1 TABLET ORAL DAILY
Status: DISCONTINUED | OUTPATIENT
Start: 2021-06-11 | End: 2021-06-12

## 2021-06-11 RX ORDER — HEPARIN SODIUM (PORCINE) LOCK FLUSH IV SOLN 100 UNIT/ML 100 UNIT/ML
SOLUTION INTRAVENOUS
Status: COMPLETED
Start: 2021-06-11 | End: 2021-06-11

## 2021-06-11 RX ORDER — MIDAZOLAM HYDROCHLORIDE 1 MG/ML
.5-2 INJECTION INTRAMUSCULAR; INTRAVENOUS PRN
Status: DISCONTINUED | OUTPATIENT
Start: 2021-06-11 | End: 2021-06-11 | Stop reason: HOSPADM

## 2021-06-11 RX ORDER — LORAZEPAM 2 MG/ML
0.5 INJECTION INTRAMUSCULAR EVERY 4 HOURS PRN
Status: DISCONTINUED | OUTPATIENT
Start: 2021-06-11 | End: 2021-06-17 | Stop reason: HOSPADM

## 2021-06-11 RX ORDER — LIDOCAINE HYDROCHLORIDE AND EPINEPHRINE 10; 10 MG/ML; UG/ML
INJECTION, SOLUTION INFILTRATION; PERINEURAL
Status: COMPLETED
Start: 2021-06-11 | End: 2021-06-11

## 2021-06-11 RX ORDER — MIDODRINE HYDROCHLORIDE 5 MG/1
10 TABLET ORAL ONCE
Status: COMPLETED | OUTPATIENT
Start: 2021-06-11 | End: 2021-06-11

## 2021-06-11 RX ORDER — MIDAZOLAM HYDROCHLORIDE 1 MG/ML
INJECTION INTRAMUSCULAR; INTRAVENOUS
Status: COMPLETED
Start: 2021-06-11 | End: 2021-06-11

## 2021-06-11 RX ORDER — DEXAMETHASONE SODIUM PHOSPHATE 4 MG/ML
4 INJECTION, SOLUTION INTRA-ARTICULAR; INTRALESIONAL; INTRAMUSCULAR; INTRAVENOUS; SOFT TISSUE EVERY 6 HOURS PRN
Status: DISCONTINUED | OUTPATIENT
Start: 2021-06-11 | End: 2021-06-17 | Stop reason: HOSPADM

## 2021-06-11 RX ORDER — CLINDAMYCIN PHOSPHATE 900 MG/50ML
900 INJECTION, SOLUTION INTRAVENOUS ONCE
Status: COMPLETED | OUTPATIENT
Start: 2021-06-11 | End: 2021-06-11

## 2021-06-11 RX ORDER — LIDOCAINE 50 MG/G
1 PATCH TOPICAL EVERY 24 HOURS
Status: DISCONTINUED | OUTPATIENT
Start: 2021-06-11 | End: 2021-06-12

## 2021-06-11 RX ORDER — ONDANSETRON 2 MG/ML
4 INJECTION INTRAMUSCULAR; INTRAVENOUS PRN
Status: DISCONTINUED | OUTPATIENT
Start: 2021-06-11 | End: 2021-06-11 | Stop reason: HOSPADM

## 2021-06-11 RX ORDER — WARFARIN SODIUM 1 MG/1
1 TABLET ORAL DAILY
Status: DISCONTINUED | OUTPATIENT
Start: 2021-06-11 | End: 2021-06-11

## 2021-06-11 RX ADMIN — CLINDAMYCIN IN 5 PERCENT DEXTROSE 900 MG: 18 INJECTION, SOLUTION INTRAVENOUS at 14:47

## 2021-06-11 RX ADMIN — FENTANYL CITRATE 25 MCG: 50 INJECTION, SOLUTION INTRAMUSCULAR; INTRAVENOUS at 14:48

## 2021-06-11 RX ADMIN — MIDODRINE HYDROCHLORIDE 10 MG: 5 TABLET ORAL at 23:31

## 2021-06-11 RX ADMIN — ASPIRIN 81 MG: 81 TABLET, COATED ORAL at 05:58

## 2021-06-11 RX ADMIN — ACETAMINOPHEN 650 MG: 325 TABLET, FILM COATED ORAL at 23:30

## 2021-06-11 RX ADMIN — ERYTHROMYCIN LACTOBIONATE 500 MG: 500 INJECTION, POWDER, LYOPHILIZED, FOR SOLUTION INTRAVENOUS at 20:59

## 2021-06-11 RX ADMIN — ERYTHROMYCIN LACTOBIONATE 500 MG: 500 INJECTION, POWDER, LYOPHILIZED, FOR SOLUTION INTRAVENOUS at 23:30

## 2021-06-11 RX ADMIN — HEPARIN 100 UNITS/ML: 100 SYRINGE at 14:15

## 2021-06-11 RX ADMIN — PRASUGREL 10 MG: 10 TABLET, FILM COATED ORAL at 05:56

## 2021-06-11 RX ADMIN — MIDAZOLAM HYDROCHLORIDE 2 MG: 1 INJECTION, SOLUTION INTRAMUSCULAR; INTRAVENOUS at 14:48

## 2021-06-11 RX ADMIN — RANOLAZINE 500 MG: 500 TABLET, FILM COATED, EXTENDED RELEASE ORAL at 08:10

## 2021-06-11 RX ADMIN — ERYTHROMYCIN LACTOBIONATE 500 MG: 500 INJECTION, POWDER, LYOPHILIZED, FOR SOLUTION INTRAVENOUS at 11:47

## 2021-06-11 RX ADMIN — WARFARIN SODIUM 1 MG: 1 TABLET ORAL at 18:15

## 2021-06-11 RX ADMIN — SODIUM CHLORIDE 500 ML: 9 INJECTION, SOLUTION INTRAVENOUS at 17:15

## 2021-06-11 RX ADMIN — ONDANSETRON 4 MG: 2 INJECTION INTRAMUSCULAR; INTRAVENOUS at 10:37

## 2021-06-11 RX ADMIN — LIDOCAINE 1 PATCH: 50 PATCH TOPICAL at 23:31

## 2021-06-11 RX ADMIN — ERYTHROMYCIN LACTOBIONATE 500 MG: 500 INJECTION, POWDER, LYOPHILIZED, FOR SOLUTION INTRAVENOUS at 05:53

## 2021-06-11 RX ADMIN — LEVOTHYROXINE SODIUM 150 MCG: 0.15 TABLET ORAL at 05:56

## 2021-06-11 RX ADMIN — LIDOCAINE HYDROCHLORIDE,EPINEPHRINE BITARTRATE: 10; .01 INJECTION, SOLUTION INFILTRATION; PERINEURAL at 14:15

## 2021-06-11 RX ADMIN — CARVEDILOL 6.25 MG: 6.25 TABLET, FILM COATED ORAL at 08:10

## 2021-06-11 RX ADMIN — ATORVASTATIN CALCIUM 40 MG: 40 TABLET, FILM COATED ORAL at 20:59

## 2021-06-11 RX ADMIN — MIDAZOLAM HYDROCHLORIDE 2 MG: 1 INJECTION INTRAMUSCULAR; INTRAVENOUS at 14:48

## 2021-06-11 RX ADMIN — IOHEXOL 10 ML: 300 INJECTION, SOLUTION INTRAVENOUS at 15:19

## 2021-06-11 RX ADMIN — OMEPRAZOLE 40 MG: 20 CAPSULE, DELAYED RELEASE ORAL at 05:56

## 2021-06-11 RX ADMIN — CYANOCOBALAMIN TAB 500 MCG 500 MCG: 500 TAB at 05:56

## 2021-06-11 RX ADMIN — METOCLOPRAMIDE 10 MG: 5 INJECTION, SOLUTION INTRAMUSCULAR; INTRAVENOUS at 05:58

## 2021-06-11 ASSESSMENT — ENCOUNTER SYMPTOMS
DEPRESSION: 0
FALLS: 0
SHORTNESS OF BREATH: 0
WEIGHT LOSS: 1
FOCAL WEAKNESS: 0
CHILLS: 0
BLOOD IN STOOL: 0
CONSTIPATION: 0
STRIDOR: 0
ABDOMINAL PAIN: 0
HALLUCINATIONS: 0
NERVOUS/ANXIOUS: 0
NAUSEA: 1
MYALGIAS: 0
HEADACHES: 0
SEIZURES: 0
DIZZINESS: 0
HEARTBURN: 0
BLURRED VISION: 0
PALPITATIONS: 0
VOMITING: 1
DIARRHEA: 0
LOSS OF CONSCIOUSNESS: 0
FLANK PAIN: 0
FEVER: 0
COUGH: 0
SORE THROAT: 0
NECK PAIN: 0

## 2021-06-11 ASSESSMENT — COGNITIVE AND FUNCTIONAL STATUS - GENERAL
STANDING UP FROM CHAIR USING ARMS: A LITTLE
DAILY ACTIVITIY SCORE: 19
SUGGESTED CMS G CODE MODIFIER MOBILITY: CK
PERSONAL GROOMING: A LITTLE
SUGGESTED CMS G CODE MODIFIER DAILY ACTIVITY: CK
DRESSING REGULAR UPPER BODY CLOTHING: A LITTLE
MOVING TO AND FROM BED TO CHAIR: A LITTLE
CLIMB 3 TO 5 STEPS WITH RAILING: A LITTLE
HELP NEEDED FOR BATHING: A LITTLE
WALKING IN HOSPITAL ROOM: A LITTLE
MOVING FROM LYING ON BACK TO SITTING ON SIDE OF FLAT BED: A LITTLE
MOBILITY SCORE: 19
DRESSING REGULAR LOWER BODY CLOTHING: A LITTLE
TOILETING: A LITTLE

## 2021-06-11 ASSESSMENT — PAIN DESCRIPTION - PAIN TYPE
TYPE: ACUTE PAIN

## 2021-06-11 ASSESSMENT — FIBROSIS 4 INDEX: FIB4 SCORE: 4.03

## 2021-06-11 NOTE — PROGRESS NOTES
0759 Called IR regarding catheter placement and patients scheduled Coreg and Renexa, Per IR technician and Apryl these medications are okay to administered before procedure.    0828 Paged Akila RODRIGUEZ regharding patietns 2.60 INR, tunneled catheter placement procedure, and HIDA scan. No new orders received, per MD catheter placement is up to IR. MD would prefer to have catheter placed before HIDA scan.    1111 Paged Akila RODRIGUEZ regarding patients 2.60 INR and possibility of vitamin k orders. MD to place orders as he deems appropriate. No orders received.    1115 Called Casey APRN regarding IR questions, APRN to call Apryl BAUMANN.    1133 Per Akila RODRIGUEZ, conversation was made between MD and IR to proceed with the tunnel catheter insertion, with the 2.60 INR.    1321 Called Apryl BAUMANN, catheter placement is planned for this evening.    1402 Patient transported down to IR with transport technician via bed.    1411 Patient is to be transfered to higher level of care. Charge RN notified to escalate patients bed placement on a telemetry unit after patients procedure.    1427 Report given to Leola FAUSTIN.

## 2021-06-11 NOTE — OR SURGEON
Immediate Post- Operative Note    PostOp Diagnosis: RENAL FAILURE      Procedure(s): RIGHT INTERNAL JUGULAR 14.5 [place] 23 CM HEMOSPLIT TUNNELED HEMODIALYSIS CATHETER PLACEMENT WITH U/S AND FLUOROSCOPIC GUIDANCE    RIGHT INTERNAL JUGULAR  VENOGRAM SHOWS NARROWING AND TORTUOUS SEGMENT AT IJ-R BCV JXN WHICH INITIALLY DEFLECTED GUIDEWIRE CEPHALAD. ULTIMATELY WAS ABLE TO NAVIGATE AND PLACE HD CATH SUCCESSFULLY.       Estimated Blood Loss: <20CC (FLUSHES)        Complications: NONE          6/11/2021     3:35 PM     Sukhwinder Gao M.D.

## 2021-06-11 NOTE — CARE PLAN
The patient is Stable - Low risk of patient condition declining or worsening    Shift Goals  Clinical Goals: nutrition; peritoneal dialysis  Patient Goals: rest    Problem: Fall Risk  Goal: Patient will remain free from falls  6/10/2021 2003 by Fredy Crawford R.N.  Outcome: Progressing    Problem: Nutrition  Goal: Patient's nutritional and fluid intake will be adequate or improve  6/10/2021 2003 by Fredy Crawford RCalvinN.  Outcome: Not Progressing   Still noted with nausea and vomiting; poor PO intake

## 2021-06-11 NOTE — PROGRESS NOTES
Pt aseptically connected to CCPD cycler @ 1705 with all 1.5% PD solution. PD exit site cleansed, no s/sx of infection, gent ointment applied as ordered. PD dsg changed, CDI. Pt stable, reported weakness, no pain. Report including in-service, troubleshooting and on-call system provided to primary RN. See flow sheet for details.

## 2021-06-11 NOTE — PROGRESS NOTES
Inpatient Anticoagulation Service Note    Date: 6/11/2021    Reason for Anticoagulation:  (PVD, Documented hx of arterial thrombosis )   Target INR: 2.0 to 3.0  [id number] VASc Score: 2  HAS-BLED Score: 4   Hemoglobin Value: (!) 10.6  Hematocrit Value: (!) 32.9  Lab Platelet Value: (!) 152    INR from last 7 days     Date/Time INR Value    06/11/21 0634  (!) 2.6    06/10/21 04:42:01  (!) 3.23    06/09/21 0702  (!) 2.98    06/08/21 0550  (!) 3.31    06/07/21 06:25:01  (!) 3.33    06/06/21 07:30:01  (!) 2.73    06/05/21 0744  (!) 2.31        Dose from last 7 days     Date/Time Dose (mg)    06/11/21 1621  1    06/10/21 1249  0    06/09/21 1324  1.25    06/07/21 1206  1.25    06/06/21 1246  2.5    06/05/21 1212  2.5    06/04/21 2152  2.5        Average Dose (mg):  (Documented warfarin home dose:2.5mg po daily. )  Significant Interactions: Aspirin, Antiplatelet Medications, Proton Pump Inhibitor, Statin, Thyroid Medications, Other (Comments) (erythromycin)  Bridge Therapy: No     Reversal Agent Administered: Not Applicable    Comments:   67 y/o M w/ PHMx ESRD, CAD s/p multiple stents, valvular heart disease, HTN, HLD presented 6/4/2021 to [facility] with complaint of nausea, vomiting poor oral intake and weight loss of 45 pounds over the past month; then subsequently transferred to [facility] w/ possible SMA stenosis.  Takes Effient in addition to warfarin PTA for his vascular disease. INR therapeutic on admit.  06/04/21 CT: AP: No acute abdominal or pelvic findings.     MD note 04/01/21: Warfarin for re-current thromboembolic disease , note reference hx of arterial thrombosis. Warfarin order references for warfarin AFib , paged MD to discuss warfarin indication. DDI noted. No documented signs of overt bleeding.     Up date: Warfarin indication , warfarin for previous arterial thrombosis.     Plan:  Warfarin 1mg po tonight , INR in AM   Education Material Provided?: No  Pharmacist suggested discharge dosing:  Warfarin 1.5-2mg po daily , follow up with anticoagulation clinic after discharge.      Godwin Joya, PharmD, BCPS

## 2021-06-11 NOTE — PROGRESS NOTES
IR RN note      Reviewed sedation orders with MD  Patient underwent a Tunneled Hemodialysis Catheter Placement by Dr. Gao.  Procedure site was verified by MD using imaging guidance. Consent was signed.  IR wanda Jett and Shiela assisted. Patient was placed in a supine position.  Vitals were taken every 5 minutes and remained stable during procedure (see doc flow sheet for results).  CO2 waveform capnography was monitored throughout procedure, see chart.  Right upper chest access site.   A sutures, steri strips, biopatch, dermabond and tegaderm dressing was placed over surgical site. Report called to Leola FAUSTIN. Pt transported by bed with RN to T703-1.     Hemosplit Long term hemodialysis Catheter 14.5 Fr 23 cm - Right upper chest  REF # [id number]  LOT # [id number]  Exp. 1/31/23

## 2021-06-11 NOTE — PROGRESS NOTES
HD and PD tx cancelled today ordered by Dr Rivas due to low BP, order to give 500 ml NS Bolus relayed to primary RN, primary RN acknowledged..

## 2021-06-11 NOTE — THERAPY
Missed Therapy     Patient Name: Francis Purvis  Age:  66 y.o., Sex:  male  Medical Record #: [id number]  Today's Date: 6/11/2021    Discussed missed therapy with RN    Attempted to see pt for therapy. Pt off the unit getting dialysis catheter placed. Will continue to follow and re-attempt as able.

## 2021-06-11 NOTE — CARE PLAN
The patient is Stable - Low risk of patient condition declining or worsening    Shift Goals  Clinical Goals: adequate po intake. decrease nausea  Patient Goals: rest    Progress made toward(s) clinical / shift goals:  pt started on reglan. Small meals at a time. Encouraged ambulation. NPO at midnight for procedure in AM    Patient is not progressing towards the following goals:      Problem: Nutrition  Goal: Patient's nutritional and fluid intake will be adequate or improve  Outcome: Not Progressing      Problem: Fall Risk  Goal: Patient will remain free from falls  Outcome: Progressing

## 2021-06-11 NOTE — CARE PLAN
The patient is Watcher - Medium risk of patient condition declining or worsening    Shift Goals  Clinical Goals: adequate po intake. decrease nausea  Patient Goals: rest    Progress made toward(s) clinical / shift goals:        Problem: Knowledge Deficit - Standard  Goal: Patient and family/care givers will demonstrate understanding of plan of care, disease process/condition, diagnostic tests and medications  Outcome: Progressing     Problem: Fluid Volume  Goal: Fluid volume balance will be maintained  Outcome: Progressing     Problem: Urinary - Renal Perfusion  Goal: Ability to achieve and maintain adequate renal perfusion and functioning will improve  Outcome: Progressing     Problem: Respiratory  Goal: Patient will achieve/maintain optimum respiratory ventilation and gas exchange  Outcome: Progressing       Patient is not progressing towards the following goals:

## 2021-06-11 NOTE — PROGRESS NOTES
"  Gastroenterology (GIC) Progress Note     Author: Nilsondemetrius SmallSURESH  Date & Time Created: 6/11/2021 8:52 AM    Chief Complaint:  Nausea and vomiting    Interval History:  6/9/2021: Feels the same, still with nausea and vomiting. Denied abdominal pain.  Stools are semi-solid but not liquid.  Denied hematochezia or melena.  Denied further modifying factors, associated symptoms or timing issues.    6/10/2021: Stable, slightly less vomiting. No abdominal pain. Gastric emptying study to be done today.    6/11/2021: Still vomiting. Failed GES yesterday due to vomiting. To get Hemodialysis port today. HIDA scan has been ordered.  Denied further modifying factors, associated symptoms or timing issues.       History of Present Illness per consult note:    \"He is a 66-year-old male patient seen in consultation for nausea, vomiting, weight loss.  The patient was admitted on 6/4/2021 with complaints of nausea and vomiting with poor oral intake and weight loss.  The patient states that over the last 4 to 6 weeks he has had problems with intermittent nausea, vomiting that can occur randomly.  He states that if he vomits, often it will appear liquid and bilious in color.  However, at times he can regurgitate liquid or food from his stomach.  He cannot accurately tell me how often this occurs despite several questions asked in different ways about his vomiting.  It does sound like at times he can have food that sticks in his midesophagus when swallowing that he can regurgitate such as solid food, but this is not consistent.  He does not seem to have problems with liquids or the swallowing mechanism.  He states he is lost 35 to 45 pounds over the last month to month and a half.  He denies abdominal pain, fevers, chills, melena, hematochezia.  He denies GERD or heartburn symptoms.  The patient had similar symptoms of nausea and vomiting 2019 seen by Dr. Butch Hunter with [facility].  EGD performed by Dr. Scott Arciniega at that " "time demonstrated hiatal hernia and no other abnormalities.  Prior to that the patient did have what sounds to be bleeding esophageal and gastric ulcers, although I do not have primary data of this.  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  He also underwent previous left below the knee amputation related to his vascular disease.  He also at one point had imaging summary that suggested possible superior mesenteric artery stenosis, but the patient tells me that vascular surgery saw him and felt that he did not have this condition and no intervention was done at that time.     Labs and imaging reviewed.  CBC consistent with RBC 12.0, hematocrit 22.0, RDW 63.1, platelets 151.  Sed rate is normal 6/4/2021.  CMP with sodium 132, potassium 2.8, chloride 89, creatinine 12.70.  LFT 6/4/2021 are normal.  INR today 3.33  on Coumadin.  CT abdomen and pelvis with contrast on 6/4/2021 demonstrated no acute abdominal or pelvic findings.  SMA and celiac axis noted to be patent, although contrast is difficult to confirm within the diminutive and markedly atherosclerotic renal arteries.  Upper GI series with KUB demonstrated normal-appearing esophagus without stricture, esophagitis, or reflux.  Stomach and duodenum also appeared normal.\"       Review of Systems:  Review of Systems   Constitutional: Positive for weight loss. Negative for chills and fever.   HENT: Negative for sore throat.    Respiratory: Negative for cough, shortness of breath and stridor.    Cardiovascular: Negative for chest pain and palpitations.   Gastrointestinal: Positive for nausea and vomiting. Negative for blood in stool, constipation, diarrhea and melena.   Genitourinary: Negative for flank pain and hematuria.   Musculoskeletal: Negative for falls and neck pain.   Skin: Negative for itching and rash.   Neurological: " Negative for seizures and loss of consciousness.   Psychiatric/Behavioral: Negative for hallucinations. The patient is not nervous/anxious.    All other systems reviewed and are negative.      Physical Exam:  Physical Exam  Vitals and nursing note reviewed. Exam conducted with a chaperone present.   Constitutional:       General: He is not in acute distress.     Appearance: Normal appearance. He is ill-appearing. He is not toxic-appearing or diaphoretic.   HENT:      Head: Normocephalic and atraumatic.      Mouth/Throat:      Mouth: Mucous membranes are moist.      Pharynx: Oropharynx is clear. No posterior oropharyngeal erythema.   Eyes:      General: No scleral icterus.     Extraocular Movements: Extraocular movements intact.      Conjunctiva/sclera: Conjunctivae normal.      Pupils: Pupils are equal, round, and reactive to light.   Cardiovascular:      Rate and Rhythm: Normal rate and regular rhythm.      Pulses: Normal pulses.      Heart sounds: Normal heart sounds.   Pulmonary:      Effort: Pulmonary effort is normal. No respiratory distress.      Breath sounds: Normal breath sounds. No stridor.   Abdominal:      General: Abdomen is flat. Bowel sounds are normal. There is no distension.      Palpations: Abdomen is soft.      Tenderness: There is no abdominal tenderness. There is no rebound.      Comments: Peritoneal dialysis catheter   Musculoskeletal:         General: Deformity present. Normal range of motion.      Cervical back: Normal range of motion and neck supple.      Comments: Left BKA   Skin:     General: Skin is warm and dry.      Coloration: Skin is not jaundiced or pale.   Neurological:      General: No focal deficit present.      Mental Status: He is alert and oriented to person, place, and time.      Coordination: Coordination normal.   Psychiatric:         Thought Content: Thought content normal.         Judgment: Judgment normal.         Labs:          Recent Labs     06/09/21  0702  06/10/21  0442 06/11/21  0634   SODIUM 131* 129* 131*   POTASSIUM 4.2 3.4* 4.0   CHLORIDE 91* 89* 92*   CO2 22 26 26   BUN 61* 62* 63*   CREATININE 11.75* 11.84* 11.38*   PHOSPHORUS  --  4.9* 4.9*   CALCIUM 9.0 8.6 8.5     Recent Labs     21  0702 06/10/21  0442 06/11/21  0634   ALTSGPT 46  --   --    ASTSGOT 62*  --   --    ALKPHOSPHAT 114*  --   --    TBILIRUBIN 0.7  --   --    GLUCOSE 95 93 89     Recent Labs     21  0702 06/10/21  0442 06/11/21  0634   RBC 3.68* 3.18*  --    HEMOGLOBIN 12.3* 10.2*  --    HEMATOCRIT 37.8* 32.3*  --    PLATELETCT 192 128*  --    PROTHROMBTM 31.9* 34.0* 27.0*   INR 2.98* 3.23* 2.60*     Recent Labs     06/09/21  0702 06/10/21  0442   WBC 11.2* 7.2   NEUTSPOLYS 80.20* 67.40   LYMPHOCYTES 9.90* 20.70*   MONOCYTES 8.60 9.90   EOSINOPHILS 0.50 1.30   BASOPHILS 0.40 0.30   ASTSGOT 62*  --    ALTSGPT 46  --    ALKPHOSPHAT 114*  --    TBILIRUBIN 0.7  --      Hemodynamics:  Temp (24hrs), Av.2 °C (97.2 °F), Min:36 °C (96.8 °F), Max:36.4 °C (97.6 °F)  Temperature: 36 °C (96.8 °F)  Pulse  Av  Min: 57  Max: 105   Blood Pressure : 117/65     Respiratory:    Respiration: 16, Pulse Oximetry: 98 %           Fluids:    Intake/Output Summary (Last 24 hours) at 2021 0906  Last data filed at 2021 0715  Gross per 24 hour   Intake 125 ml   Output 358 ml   Net -233 ml        GI/Nutrition:  Orders Placed This Encounter   Procedures   • Diet NPO     Standing Status:   Standing     Number of Occurrences:   1     Order Specific Question:   Restrict to:     Answer:   Sips with Medications [3]     Medical Decision Making, by Problem:  Active Hospital Problems    Diagnosis    • *Failure to thrive in adult [R62.7]    • Systolic CHF, chronic (HCC) [I50.22]    • Superior mesenteric artery stenosis (HCC) [K55.1]    • Severe protein-calorie malnutrition (HCC) [E43]    • PVD (peripheral vascular disease) (HCC) [I73.9]    • Anxiety [F41.9]    • ACP (advance care planning) [Z71.89]    •  Non-rheumatic mitral regurgitation [I34.0]    • Hypothyroid [E03.9]    • CAD (coronary artery disease) [I25.10]    • Mixed hyperlipidemia [E78.2]    • Essential hypertension [I10]    • Anemia in chronic kidney disease (CKD) [N18.9, D63.1]    • ESRD on peritoneal dialysis (HCC) [N18.6, Z99.2]    • C. difficile colitis [A04.72]    • Intractable nausea and vomiting [R11.2]    • Atherosclerosis of right lower extremity with rest pain (HCC) [I70.221]    • Status post below knee amputation of left lower extremity [Z89.512]    • Testicular cancer ([facility]) [C62.90]      Problems:  1.  Nausea and vomiting, no evidence of stricture by EGD; cortisol was normal on 6/4/2021  2.  Non-erosive gastritis, biopsies negative for active inflammation or H. Pylori  3.  Weight loss, duodenal biopsy negative for celiac sprue  4.  Protein calorie malnutrition  5.  Gastric polyps pathology consistent with fundic gland polyps  6.  Peripheral vascular disease status post previous bypass surgery and left below the knee amputation currently on Coumadin  7.  End-stage renal disease on peritoneal dialysis  8.  History of possible superior mesenteric artery stenosis-no current CT evidence of this  9.  Hyperlipidemia  10.  Hypothyroidism  11.  Coronary artery disease  12.  Macrocytic anemia in chronic kidney disease  13.  COVID negative on 6/4  14.  Complex patient with multiple medical comorbidities with increased complexity medical decision making    Recommendations:  1.  Continue prilosec to 40mg QD  2.  unable to tolerate GES due to vomiting so changed reglan to IV and increased dosage as well started IV erythromycin x 3 doses.  This will maximize treatment for presumed gastroparesis.  Warned patient of risks of tardive dyskinesia with reglan in layman's terms and he wishes to proceed with treatment and accepted risks.    3.  Patient to get Hemodialysis port today  4.  Patient had borderline hydropic gallbladder on US. HIDA scan has been ordered as  this could potentially be a cause of his nausea and vomiting although less likely without recurrent pain.  5.  If HD does not solve the issue, and HIDA is normal, consider outpatient smart pill study with patency capsule procedure prior to ensure patency of the small bowel.   6.  Dr. Anjum Navarro and/or Nilson Small NP will be back tomorrow and over weekend to round on patient.  7.  Will need close follow-up as an outpatient with established GI Deidra Madrigal or Dr. Hunter as an outpatient.       Quality-Core Measures

## 2021-06-11 NOTE — PROGRESS NOTES
Pt refused bed alarm despite education. Alert and oriented x 4. Pt also insisting on having BSC at bedside. Pt with  ongoing peritoneal dialysis. Unable to go to the bathroom. Left BKa. Uses prosthesis when up. Pt is a moderate fall risk.

## 2021-06-11 NOTE — PROGRESS NOTES
Received bedside report from RN, pt care assumed, VSS, pt assessment complete. Pt AAOx4, no c/o pain at this time. Pt received medication for pain prior to coming to floor. No signs of acute distress noted at this time. POC discussed with pt and verbalizes no questions. Pt denies any additional needs at this time. Bed in lowest position, bed alarm in place, pt educated on fall risk and verbalized understanding, call light within reach, hourly rounding initiated.

## 2021-06-11 NOTE — PROGRESS NOTES
[facility] -  PROGRESS NOTE               Author: ASAEL Basilio.  Collaborating Physician: Dr. Rivas  Date & Time: 6/11/2021  10:24 AM     HPI:  65 yo M PMH ESRD CCPD, HTN, anemia of CKD, CKD-MBD, HLD, and CAD who presents to ED with CC as above.  He has chronic intermittent N/V that he's been battling for a long time.  It leads to wide fluctuations in his po intake.  He also has severe CAD and non-cardiac atherosclerosis.  Recently he feel she has lost 45 lbs in the past month due to the N/V.  He's had a CTA in past that has revealed SMA steosis, but report was not available here at this institution.  Repeat imaging ordered by admit MD and he was admitted for further evaluation.  Has no abd pain and otherwise denies any associated F/C/CP/SOB.  No melena, hematochezia, hematemesis.  No HA, visual changes.    DAILY NEPHROLOGY SUMMARY:  6/05 - Consult done  6/06 - NAEO, +N/V; Unable to keep majority of food down  6/07 - sitting up in bed, still having poor intake vomited this am, PD UF: 461ml, had UGI: WNL, had large BM this am   6/08: sitting up in bed, feels miserable, weak and nauseated, for EGD today, discussed case with Dr. Rivas, dont believe PD is contributing factor as pt BUN is only mildly elevated and pt has been on PD for > 1 year, if GI workup neg could consider transitioning to HD to see if helps elevate symptoms, tolerated PD with UF: 450ml  6/09: Ambulating in room. C/o continued N/V and dehydration. S/p EGD w/bx's of stomach/duodenum.  318 mL net UF. GES today.  6/10: Nauseous with small amount of emesis this am. Does not feel he can tolerate food with GES this am. Primary RN at . 473 Net UF CCPD.   6/11: Unable to complete GES due to nausea. Continues to have nausea.       REVIEW OF SYSTEMS:    10 point ROS reviewed and is as per HPI/daily summary or otherwise negative    PMH/PSH/SH/FH: Reviewed and unchanged since admission note  CURRENT MEDICATIONS: Reviewed  "from admission to present day    VS:  /65   Pulse 95   Temp 36 °C (96.8 °F) (Temporal)   Resp 16   Ht 1.778 m (5' 10\")   Wt 50.5 kg (111 lb 5.3 oz) Comment: 3.8 prosthetic/shoes subtracted  SpO2 98%   BMI 15.97 kg/m²   Physical Exam  Nursing note reviewed.   Constitutional:       General: He is not in acute distress.     Appearance: Normal appearance. He is ill-appearing. He is not diaphoretic.      Comments: Cachectic    HENT:      Head: Normocephalic and atraumatic.   Neck:      Trachea: No tracheal tenderness.   Cardiovascular:      Rate and Rhythm: Normal rate.      Heart sounds: Murmur heard.   No friction rub. No gallop.       Comments: No edema  Pulmonary:      Effort: Pulmonary effort is normal. No respiratory distress.   Abdominal:      General: There is no distension.      Comments: PD Cath in place   Musculoskeletal:         General: No deformity.      Comments: L BKA w/prosthesis   Skin:     General: Skin is warm and dry.      Coloration: Skin is not jaundiced.      Findings: No rash.   Neurological:      Mental Status: He is alert and oriented to person, place, and time.      Motor: No atrophy.   Psychiatric:         Behavior: Behavior normal.     Fluids:  No intake/output data recorded.    LABS:  Recent Labs     06/09/21  0702 06/10/21  0442 06/11/21  0634   SODIUM 131* 129* 131*   POTASSIUM 4.2 3.4* 4.0   CHLORIDE 91* 89* 92*   CO2 22 26 26   GLUCOSE 95 93 89   BUN 61* 62* 63*   CREATININE 11.75* 11.84* 11.38*   CALCIUM 9.0 8.6 8.5       IMPRESSION:  # ESRD   - Etiology likely 2/2 HTN   - PD Rx: 9hrs tx time; 6 x 2.5L exchanges    * 2.5/1.5 % fluid; No last fill/no mid day exchange   - Maybe failing PD due to his GI issues, nutrition status is poor   - discussed case with Dr. Rivas, alant believe PD is contributing factor as pt BUN is only mildly elevated and pt has been on PD for > 1 year, if GI workup neg could consider transitioning to HD to see if helps eleviate symptoms  # N/V   - " Etiology unclear   - Chronic intermittent issue   - Followed by GI and has had extensive work up, upper GI: WNL , for EGD 6/8    - Bx stomach and duodenum pending    - Atherosclerosis of GI vasculature a possibility   - No evidence of stricture, no erosive gastritis   - GES today: gastroparesis vs. PD, IV reglan tx and IV erythromycin x3 doses   - Considering outpatient Smart pill study   - HIDA scan ordered   # HTN, controlled    - Goal BP < 140/90   - Stable  # Anemia of CKD   - Goal Hgb 10-11   - Stable  # CKD-MBD   - managed at outpt clinic    - hold phos binder for now with N/V  # Hypokalemia, improved    - KCL IV x 1 6/6   - continue PO KCL 20meq daily   # Hyponatremia, mild   - UF with HD as tolerated      PLAN:  - Daily CCPD until TDC placed and HD started  - IR for tunneled cath placement  - Plan to start HD tomorrow (SAT)  - Adjust Rx of PD as needed  - No dietary protein restrictions  - Dose all meds per ESRD  - Regular diet ok  - No fluid restrictions at this time  - Nepro with meals  - hold phos binders   - Okay to bring Liquacel Protein supplement from home   - continue PO KCL daily

## 2021-06-11 NOTE — PROGRESS NOTES
Pt aseptically disconnected from CCPD at 0650. Effluent clear, yellow, no fibrin. PD Dsg. CDI. 24 hour Net UF : 613 ml . Pt verbalized a possible HD line placement to transition to HD, primary RN confirmed, will follow up with MD today. Report given to primary RN.

## 2021-06-11 NOTE — PROGRESS NOTES
Assumed care at 1845. Pt resting in bed.   A&ox 4  Still with persistent nausea/dry heaves. No vomiting   Minimal intake. No bm for last 3 days. +flatus.  Taking sips. Educated pt about skipping breakfast for possible procedure in AM  Ongoing peritoneal dialysis  Left bka. Uses prosthesis to ambulate  O2 on RA  Pain controlled  Call light within reach. Hourly rounding in place

## 2021-06-11 NOTE — PROGRESS NOTES
[facility] Medicine Daily Progress Note    Date of Service  6/11/2021    Chief Complaint  66 y.o. male presented 6/4/2021 with N/V, unable to tolerate po and constipation    Hospital Course  Per Dr. Kaye's HPI:  66 y.o. M with ESRD on CCPD, CAD s/p multiple stents, valvular heart disease, HTN, HLD presented 6/4/2021 to [facility] with complaint of nausea, vomiting poor oral intake and weight loss of 45 pounds over the past month; then subsequently transferred to [facility] as a divert on the same day.  It was noted that he had CTA abdominal pelvis revealed SMA stenosis. He reported having history of C. difficile in April 2020, has not had diarrhea over the past month and reported to have negative C. difficile study a week ago. Upon admission, patient appeared comfortable with no deranged vital signs.  Abdominal exam is benign, doubt he has SMA stenosis given benign exam and on warfarin with therapeutic INR.  Additionally, he does take Effient in addition to warfarin for his vascular disease.  However, CTAP with contrast was ordered on admisison to ensure no SMA thrombosis.  Patient was admitted to medicine service for further evaluation and treatment.    6/5: vitals wnl; afebrile. No pain reported.   [facility] Nephrology Dr. Cantrell - knows this patient well. Peritoneal HD restarted.  During rounds, tolerated oral diet and reported feeling better. Refused suppository and wanted to try oral laxative first.   CT abd/pelvis with: the SMA and celiac axis are noted to be patent;  No acute abdominal or pelvic findings. CXR: no acute cardiopulm disease  US ABd: No sonographic signs of acute cholecystitis.    6/6: vitals and exam grossly unremarkable   At bedside, a bit discouraged. Lunch/dinner was late plus peritoneal HD was early. With all these changes in routine, he vomited lunch when Miralax was given. Current challenges with inability to tolerate po, N/V and severe constipation discussed - this has happened  multiple times. Previously resolved on his own. Early diet tray ordered. He agreed for suppository and UGI series done - grossly unremarkable.   CT chest - some nodules noted (inflammation, infection vs scarring) - advised to follow up.       Interval Problem Update  6/7  Vitals reviewed; afebrile.  The rest of the vitals within normal parameters.  Pain scale reported - none  Blood glucose in last 24hrs - around 70s  I/O - Still have poor oral intake; vomited early this AM; finally had a small BM 6/6 PM    At bedside, no acute complain but reports of still vomiting and inability to tolerate po. Had a bite of breakfast and nausea (despite having zofran prior to his meal).     We discussed about Abd/Pel CT and UGI series - no evidence of blockage at this point. Reglan has also been added for possible motility issue. He has been having small BM since yesterday. Additional meds like haldol prn available for N/V. Unclear at this point how PD and mental aspect are affecting his current issues with failure to thrive.     [facility] is known to [facility] - we discussed about possibly having GI team evaluate.     Labs reviewed  Na 132, Cr 12.7  K 2.8 (being supplemented)  WBC 7.4, Plt 151  INR 3.33    Echo: Severely reduced left ventricular systolic function. Left ventricular ejection fraction is visually estimated to be 30%. Severe mitral regurgitation. Moderate aortic insufficiency. RVSP 45    UGI series with KUB - unremarkable upper GI exam    6/8 patient is resting in bed, he feels weak, he is n.p.o. for EGD today, denies nausea or vomiting.  6/9 In bed drinking sips of water, no fever or chills, GES pending, no fever or chills alert and oriented still feels weak.   6/10 continues to feel weak, no fever or chills, GES today could be tolerated patient vomited, will f/u with GI. Uremia related? Might need to change to HD.   6/11 in bed feels weak no fever or chills, going for HD cath placement today, qtc is 626 checking Mg, K  is stable, dc'ed zofran and haldol for nausea start decadron and ativan prn.     Addendum: RRT called due to patient hypotension after dialysis cath placement, bp in the 78/45 patient is lethargic, placed in trendelenburg position I have ordered 500cc bolus, came to bedside to assess patient he is alert and oriented c/o dry mouth, follows commands no sob no chest pain no dizziness or light headedness. Patient received  Fentanyl and versed for procedure, bp is slowly coming back up, 80/46, patient has not eaten on drank anything today will probably need another 250cc bolus will need close monitoring due to  H/o HFrEF and ESRD.discussed with nurse staff and with RRT staff.     bp slowly improving 90/52 continue telemetry, close monitoring.       Consultants/Specialty  Nephrology  GI    Code Status  Full Code    Disposition  To be determined      Review of Systems  Review of Systems   Constitutional: Positive for weight loss. Negative for chills and fever.   HENT: Negative for sore throat.    Eyes: Negative for blurred vision.   Respiratory: Negative for cough.    Cardiovascular: Negative for chest pain and palpitations.   Gastrointestinal: Positive for nausea and vomiting. Negative for abdominal pain and heartburn.   Genitourinary: Negative for dysuria, frequency and urgency.   Musculoskeletal: Negative for myalgias and neck pain.        Nerve pain in right leg   Neurological: Negative for dizziness, focal weakness and headaches.   Psychiatric/Behavioral: Negative for depression.        Physical Exam  Temp:  [36 °C (96.8 °F)-36.4 °C (97.6 °F)] 36.2 °C (97.1 °F)  Pulse:  [] 89  Resp:  [16-18] 17  BP: ()/(51-73) 113/68  SpO2:  [90 %-99 %] 95 %    Physical Exam  Vitals and nursing note reviewed.   Constitutional:       General: He is not in acute distress.     Appearance: Normal appearance. He is not ill-appearing.      Comments: Cachetic   HENT:      Head: Normocephalic and atraumatic.      Mouth/Throat:       Mouth: Mucous membranes are moist.      Pharynx: Oropharynx is clear.   Eyes:      Conjunctiva/sclera: Conjunctivae normal.   Cardiovascular:      Rate and Rhythm: Normal rate and regular rhythm.      Pulses: Normal pulses.      Heart sounds: Murmur heard.     Pulmonary:      Effort: Pulmonary effort is normal. No respiratory distress.      Breath sounds: Normal breath sounds. No wheezing.   Abdominal:      General: Bowel sounds are normal. There is no distension.      Palpations: Abdomen is soft.      Comments: Peritoneal catheter noted (C/D/I)   Musculoskeletal:         General: No swelling or tenderness. Normal range of motion.      Comments: Left BKA stump - C/D/I   Skin:     General: Skin is warm and dry.      Capillary Refill: Capillary refill takes less than 2 seconds.      Coloration: Skin is not jaundiced.   Neurological:      General: No focal deficit present.      Mental Status: He is alert and oriented to person, place, and time.   Psychiatric:         Mood and Affect: Mood normal.         Fluids    Intake/Output Summary (Last 24 hours) at 6/11/2021 1403  Last data filed at 6/11/2021 0700  Gross per 24 hour   Intake --   Output 613 ml   Net -613 ml       Laboratory  Recent Labs     06/09/21  0702 06/10/21  0442   WBC 11.2* 7.2   RBC 3.68* 3.18*   HEMOGLOBIN 12.3* 10.2*   HEMATOCRIT 37.8* 32.3*   .7* 101.6*   MCH 33.4* 32.1   MCHC 32.5* 31.6*   RDW 63.4* 61.4*   PLATELETCT 192 128*   MPV 10.2 10.8     Recent Labs     06/09/21  0702 06/10/21  0442 06/11/21  0634   SODIUM 131* 129* 131*   POTASSIUM 4.2 3.4* 4.0   CHLORIDE 91* 89* 92*   CO2 22 26 26   GLUCOSE 95 93 89   BUN 61* 62* 63*   CREATININE 11.75* 11.84* 11.38*   CALCIUM 9.0 8.6 8.5     Recent Labs     06/09/21  0702 06/10/21  0442 06/11/21  0634   INR 2.98* 3.23* 2.60*               Imaging  DX-UPPER GI-SERIES WITH KUB   Final Result      1.  Unremarkable upper GI exam.         EC-ECHOCARDIOGRAM COMPLETE W/O CONT   Final Result      CT-CHEST  (THORAX) W/O   Final Result      1.  Abnormal lung parenchyma      2.  Specifically, there are multifocal bilateral airspace process, 14 mm right middle lobe groundglass density, posterior aspect right lower lobe 12 mm area of spiculation and probably underlying emphysema.      3.  Pulmonary findings are nonspecific and could be inflammation, infection, or postinflammatory scarring. Neoplasm is less likely.      4.  Dense aortic and branch vessel atherosclerotic plaque      5.  Pulmonary artery hypertension      6.  Very small bilateral pleural effusion      Fleischner Society pulmonary nodule recommendations:      Ground Glass and Part Solid: No routine follow-up      Comments: In certain suspicious nodules less than 6 mm, consider follow-up at 2 and 4 years. If solid component(s) or growth develops, consider resection.      Note: These recommendations do not apply to lung cancer screening, patients with immunosuppression, or patients with known primary cancer.      Fleischner Society 2017 Guidelines for Management of Incidentally Detected Pulmonary Nodules in Adults      OUTSIDE IMAGES-US ABDOMEN   Final Result      OUTSIDE IMAGES-DX CHEST   Final Result      CT-ABDOMEN-PELVIS WITH   Final Result   Addendum 1 of 1   On further review, the SMA and celiac axis are noted to be patent.    Contrast is difficult to confirm within the diminutive and markedly    atherosclerotic renal arteries.      Final      1.  No acute abdominal or pelvic findings.   2.  Bilaterally small atrophic appearing kidneys consistent with renal failure, with a dialysis catheter noted in the pelvic cavity.   3.  Colonic diverticulosis without diverticulitis.      NM-BILIARY (HIDA) SCAN WITH CCK    (Results Pending)   IR-ROSALES,GROSHONG PLACEMENT >5    (Results Pending)        Assessment/Plan  * Intractable nausea and vomiting  Assessment & Plan  Antiemetic  Supportive care  No acute etiology seen on CTAP  Could be back up from constipation -  has now started to have some BM    EGD did not show source of his symptoms  GES could not tolerate test.   nephro planning on transitioning to HD.   On reglan and erythromycin per GI.       Prolonged QT interval  Assessment & Plan  dc'ed zofran and haldol  qtc 627.   Will check mg will probably transfer to tele for close monitoring.     Systolic CHF, chronic (HCC)- (present on admission)  Assessment & Plan  No clinic signs of fluid overload or decompensation  C/w home regimen    ACP (advance care planning)  Assessment & Plan  Plan of care and rationale for hospitalization discussed on admission  FULL code status confirmed      Anxiety  Assessment & Plan  PRN Xanax    PVD (peripheral vascular disease) (HCC)  Assessment & Plan  S/p left BKA  Former smoker  H/o vein graft  Cont Warfarin/Effient/statin    Severe protein-calorie malnutrition (HCC)  Assessment & Plan  PO intake as tolerated  Ensure  RD consult    Failure to thrive in adult  Assessment & Plan  Reported weight loss of 45 pounds over the past few months due to poor oral intake  CT abd/pelvis with no evidence of mass or tumor  CT chest - noted nodules (will need to follow up outpatient); may be a candidate for possible outpatient colonoscopy   RD consult  Current challenges with inability to tolerate po, N/V and severe constipation    We discussed about Abd/Pel CT and UGI series - no evidence of blockage at this point. Reglan has also been added for possible motility issue. He has been having small BM since yesterday. Additional meds like haldol prn available for N/V.     Unclear at this point how PD and mental aspect are affecting his current issues with failure to thrive.     He is known to [facility] - we discussed about possibly having GI team evaluate.   PT OT            Superior mesenteric artery stenosis (HCC)  Assessment & Plan  Transferred for concern for SMA thrombosis -- reported to have +finding on CT AP previously - however, no report seen at  [facility] Arrival    CT AP at [facility]: no SMA thrombosis  INR     Non-rheumatic mitral regurgitation- (present on admission)  Assessment & Plan  Severe with low EF  No clinical signs of fluid overload  Following Cardiology outpatient    CAD (coronary artery disease)- (present on admission)  Assessment & Plan  Warfarin/Effient/BB/statin  Isodil  Ranexa for angina      Essential hypertension- (present on admission)  Assessment & Plan  Coreg    Anemia in chronic kidney disease (CKD)- (present on admission)  Assessment & Plan  Chronic, no gross bleeding    C. difficile colitis  Assessment & Plan  H/o  Reported having negative result a week prior  No WBC, Cr elevated due to ESRD  No toxic swetha colon seen on CT AP  No diarrhea so far    ESRD on peritoneal dialysis (HCC)- (present on admission)  Assessment & Plan  Continue PD  Continue phosphate binders  Nephrology consulted for assistance with care  Stable  Might need to changed to HD.     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

## 2021-06-12 ENCOUNTER — APPOINTMENT (OUTPATIENT)
Dept: RADIOLOGY | Facility: MEDICAL CENTER | Age: 66
DRG: 291 | End: 2021-06-12
Attending: HOSPITALIST
Payer: MEDICARE

## 2021-06-12 ENCOUNTER — APPOINTMENT (OUTPATIENT)
Dept: RADIOLOGY | Facility: MEDICAL CENTER | Age: 66
DRG: 291 | End: 2021-06-12
Attending: STUDENT IN AN ORGANIZED HEALTH CARE EDUCATION/TRAINING PROGRAM
Payer: MEDICARE

## 2021-06-12 PROBLEM — D64.9 ANEMIA: Status: ACTIVE | Noted: 2021-06-12

## 2021-06-12 PROBLEM — E87.6 HYPOKALEMIA: Status: ACTIVE | Noted: 2021-06-12

## 2021-06-12 PROBLEM — D69.6 THROMBOCYTOPENIA (HCC): Status: ACTIVE | Noted: 2021-06-12

## 2021-06-12 PROBLEM — R79.89 ELEVATED TROPONIN: Status: ACTIVE | Noted: 2021-06-12

## 2021-06-12 PROBLEM — R79.1 SUPRATHERAPEUTIC INR: Status: ACTIVE | Noted: 2021-06-12

## 2021-06-12 PROBLEM — R07.9 CHEST PAIN: Status: ACTIVE | Noted: 2021-06-12

## 2021-06-12 PROBLEM — E87.1 HYPONATREMIA: Status: ACTIVE | Noted: 2021-06-12

## 2021-06-12 PROBLEM — R54 FRAILTY SYNDROME IN GERIATRIC PATIENT: Status: ACTIVE | Noted: 2021-06-04

## 2021-06-12 LAB
ALBUMIN SERPL BCP-MCNC: 2.4 G/DL (ref 3.2–4.9)
ALBUMIN/GLOB SERPL: 1.6 G/DL
ALP SERPL-CCNC: 93 U/L (ref 30–99)
ALT SERPL-CCNC: 118 U/L (ref 2–50)
ANION GAP SERPL CALC-SCNC: 15 MMOL/L (ref 7–16)
AST SERPL-CCNC: 59 U/L (ref 12–45)
BILIRUB SERPL-MCNC: 0.6 MG/DL (ref 0.1–1.5)
BUN SERPL-MCNC: 62 MG/DL (ref 8–22)
CALCIUM SERPL-MCNC: 7 MG/DL (ref 8.5–10.5)
CHLORIDE SERPL-SCNC: 101 MMOL/L (ref 96–112)
CHOLEST SERPL-MCNC: 83 MG/DL (ref 100–199)
CO2 SERPL-SCNC: 19 MMOL/L (ref 20–33)
CREAT SERPL-MCNC: 10.55 MG/DL (ref 0.5–1.4)
D DIMER PPP IA.FEU-MCNC: 0.71 UG/ML (FEU) (ref 0–0.5)
EKG IMPRESSION: NORMAL
EKG IMPRESSION: NORMAL
ERYTHROCYTE [DISTWIDTH] IN BLOOD BY AUTOMATED COUNT: 62.8 FL (ref 35.9–50)
EST. AVERAGE GLUCOSE BLD GHB EST-MCNC: 71 MG/DL
GLOBULIN SER CALC-MCNC: 1.5 G/DL (ref 1.9–3.5)
GLUCOSE SERPL-MCNC: 78 MG/DL (ref 65–99)
HBA1C MFR BLD: 4.1 % (ref 4–5.6)
HCT VFR BLD AUTO: 30.1 % (ref 42–52)
HDLC SERPL-MCNC: 36 MG/DL
HGB BLD-MCNC: 9.7 G/DL (ref 14–18)
INR PPP: 5.31 (ref 0.87–1.13)
LDLC SERPL CALC-MCNC: 30 MG/DL
MCH RBC QN AUTO: 33.2 PG (ref 27–33)
MCHC RBC AUTO-ENTMCNC: 32.2 G/DL (ref 33.7–35.3)
MCV RBC AUTO: 103.1 FL (ref 81.4–97.8)
NT-PROBNP SERPL IA-MCNC: ABNORMAL PG/ML (ref 0–125)
PLATELET # BLD AUTO: 128 K/UL (ref 164–446)
PMV BLD AUTO: 10.9 FL (ref 9–12.9)
POTASSIUM SERPL-SCNC: 3.9 MMOL/L (ref 3.6–5.5)
PROT SERPL-MCNC: 3.9 G/DL (ref 6–8.2)
PROTHROMBIN TIME: 47.1 SEC (ref 12–14.6)
RBC # BLD AUTO: 2.92 M/UL (ref 4.7–6.1)
SODIUM SERPL-SCNC: 135 MMOL/L (ref 135–145)
TRIGL SERPL-MCNC: 83 MG/DL (ref 0–149)
TROPONIN T SERPL-MCNC: 1502 NG/L (ref 6–19)
TROPONIN T SERPL-MCNC: 2128 NG/L (ref 6–19)
WBC # BLD AUTO: 10.7 K/UL (ref 4.8–10.8)

## 2021-06-12 PROCEDURE — 71045 X-RAY EXAM CHEST 1 VIEW: CPT

## 2021-06-12 PROCEDURE — 85027 COMPLETE CBC AUTOMATED: CPT

## 2021-06-12 PROCEDURE — 700102 HCHG RX REV CODE 250 W/ 637 OVERRIDE(OP): Performed by: STUDENT IN AN ORGANIZED HEALTH CARE EDUCATION/TRAINING PROGRAM

## 2021-06-12 PROCEDURE — A9270 NON-COVERED ITEM OR SERVICE: HCPCS | Performed by: STUDENT IN AN ORGANIZED HEALTH CARE EDUCATION/TRAINING PROGRAM

## 2021-06-12 PROCEDURE — 85379 FIBRIN DEGRADATION QUANT: CPT

## 2021-06-12 PROCEDURE — 84484 ASSAY OF TROPONIN QUANT: CPT

## 2021-06-12 PROCEDURE — 700111 HCHG RX REV CODE 636 W/ 250 OVERRIDE (IP): Mod: JG | Performed by: INTERNAL MEDICINE

## 2021-06-12 PROCEDURE — 80061 LIPID PANEL: CPT

## 2021-06-12 PROCEDURE — 85610 PROTHROMBIN TIME: CPT

## 2021-06-12 PROCEDURE — 93005 ELECTROCARDIOGRAM TRACING: CPT | Performed by: HOSPITALIST

## 2021-06-12 PROCEDURE — 5A1D70Z PERFORMANCE OF URINARY FILTRATION, INTERMITTENT, LESS THAN 6 HOURS PER DAY: ICD-10-PCS | Performed by: INTERNAL MEDICINE

## 2021-06-12 PROCEDURE — 83036 HEMOGLOBIN GLYCOSYLATED A1C: CPT

## 2021-06-12 PROCEDURE — A9537 TC99M MEBROFENIN: HCPCS

## 2021-06-12 PROCEDURE — 99999 EKG: CPT | Performed by: HOSPITALIST

## 2021-06-12 PROCEDURE — 36415 COLL VENOUS BLD VENIPUNCTURE: CPT

## 2021-06-12 PROCEDURE — 82308 ASSAY OF CALCITONIN: CPT

## 2021-06-12 PROCEDURE — 700105 HCHG RX REV CODE 258: Performed by: HOSPITALIST

## 2021-06-12 PROCEDURE — 700102 HCHG RX REV CODE 250 W/ 637 OVERRIDE(OP): Performed by: NURSE PRACTITIONER

## 2021-06-12 PROCEDURE — 80053 COMPREHEN METABOLIC PANEL: CPT

## 2021-06-12 PROCEDURE — 770020 HCHG ROOM/CARE - TELE (206)

## 2021-06-12 PROCEDURE — 700111 HCHG RX REV CODE 636 W/ 250 OVERRIDE (IP): Performed by: FAMILY MEDICINE

## 2021-06-12 PROCEDURE — 700102 HCHG RX REV CODE 250 W/ 637 OVERRIDE(OP): Performed by: INTERNAL MEDICINE

## 2021-06-12 PROCEDURE — 83880 ASSAY OF NATRIURETIC PEPTIDE: CPT

## 2021-06-12 PROCEDURE — A9270 NON-COVERED ITEM OR SERVICE: HCPCS | Performed by: NURSE PRACTITIONER

## 2021-06-12 PROCEDURE — 99233 SBSQ HOSP IP/OBS HIGH 50: CPT | Performed by: STUDENT IN AN ORGANIZED HEALTH CARE EDUCATION/TRAINING PROGRAM

## 2021-06-12 PROCEDURE — 87040 BLOOD CULTURE FOR BACTERIA: CPT | Mod: 91

## 2021-06-12 PROCEDURE — 700111 HCHG RX REV CODE 636 W/ 250 OVERRIDE (IP): Performed by: HOSPITALIST

## 2021-06-12 PROCEDURE — A9270 NON-COVERED ITEM OR SERVICE: HCPCS | Performed by: INTERNAL MEDICINE

## 2021-06-12 PROCEDURE — 93005 ELECTROCARDIOGRAM TRACING: CPT | Performed by: STUDENT IN AN ORGANIZED HEALTH CARE EDUCATION/TRAINING PROGRAM

## 2021-06-12 PROCEDURE — 93010 ELECTROCARDIOGRAM REPORT: CPT | Performed by: INTERNAL MEDICINE

## 2021-06-12 PROCEDURE — 700101 HCHG RX REV CODE 250: Performed by: FAMILY MEDICINE

## 2021-06-12 PROCEDURE — 700105 HCHG RX REV CODE 258: Performed by: INTERNAL MEDICINE

## 2021-06-12 PROCEDURE — 700111 HCHG RX REV CODE 636 W/ 250 OVERRIDE (IP)

## 2021-06-12 PROCEDURE — 90935 HEMODIALYSIS ONE EVALUATION: CPT

## 2021-06-12 RX ORDER — HEPARIN SODIUM 1000 [USP'U]/ML
3700 INJECTION, SOLUTION INTRAVENOUS; SUBCUTANEOUS
Status: DISCONTINUED | OUTPATIENT
Start: 2021-06-12 | End: 2021-06-17 | Stop reason: HOSPADM

## 2021-06-12 RX ORDER — ATORVASTATIN CALCIUM 80 MG/1
80 TABLET, FILM COATED ORAL EVERY EVENING
Status: DISCONTINUED | OUTPATIENT
Start: 2021-06-12 | End: 2021-06-13

## 2021-06-12 RX ORDER — HEPARIN SODIUM 1000 [USP'U]/ML
INJECTION, SOLUTION INTRAVENOUS; SUBCUTANEOUS
Status: COMPLETED
Start: 2021-06-12 | End: 2021-06-12

## 2021-06-12 RX ORDER — METOCLOPRAMIDE HYDROCHLORIDE 5 MG/ML
5 INJECTION INTRAMUSCULAR; INTRAVENOUS EVERY 8 HOURS
Status: DISCONTINUED | OUTPATIENT
Start: 2021-06-12 | End: 2021-06-14

## 2021-06-12 RX ORDER — ACETAMINOPHEN 500 MG
1000 TABLET ORAL 2 TIMES DAILY
Status: DISCONTINUED | OUTPATIENT
Start: 2021-06-12 | End: 2021-06-14

## 2021-06-12 RX ORDER — LIDOCAINE 50 MG/G
2 PATCH TOPICAL EVERY 24 HOURS
Status: DISCONTINUED | OUTPATIENT
Start: 2021-06-12 | End: 2021-06-17 | Stop reason: HOSPADM

## 2021-06-12 RX ORDER — GABAPENTIN 100 MG/1
100 CAPSULE ORAL 3 TIMES DAILY
Status: DISCONTINUED | OUTPATIENT
Start: 2021-06-12 | End: 2021-06-17 | Stop reason: HOSPADM

## 2021-06-12 RX ORDER — OXYCODONE HYDROCHLORIDE 5 MG/1
5 TABLET ORAL EVERY 4 HOURS PRN
Status: DISCONTINUED | OUTPATIENT
Start: 2021-06-12 | End: 2021-06-17 | Stop reason: HOSPADM

## 2021-06-12 RX ORDER — MORPHINE SULFATE 4 MG/ML
1-2 INJECTION, SOLUTION INTRAMUSCULAR; INTRAVENOUS EVERY 4 HOURS PRN
Status: DISCONTINUED | OUTPATIENT
Start: 2021-06-12 | End: 2021-06-17 | Stop reason: HOSPADM

## 2021-06-12 RX ADMIN — ATORVASTATIN CALCIUM 80 MG: 80 TABLET, FILM COATED ORAL at 21:35

## 2021-06-12 RX ADMIN — POLYETHYLENE GLYCOL 3350 1 PACKET: 17 POWDER, FOR SOLUTION ORAL at 06:14

## 2021-06-12 RX ADMIN — MORPHINE SULFATE 2 MG: 4 INJECTION INTRAVENOUS at 21:53

## 2021-06-12 RX ADMIN — ASPIRIN 81 MG: 81 TABLET, COATED ORAL at 06:13

## 2021-06-12 RX ADMIN — LORAZEPAM 0.5 MG: 2 INJECTION INTRAMUSCULAR; INTRAVENOUS at 15:44

## 2021-06-12 RX ADMIN — HEPARIN SODIUM 3700 UNITS: 1000 INJECTION, SOLUTION INTRAVENOUS; SUBCUTANEOUS at 09:50

## 2021-06-12 RX ADMIN — MICROFIBRILLAR COLLAGEN HEMOSTAT POWDER 1 EACH: POWDER at 11:40

## 2021-06-12 RX ADMIN — METOCLOPRAMIDE 5 MG: 5 INJECTION, SOLUTION INTRAMUSCULAR; INTRAVENOUS at 19:58

## 2021-06-12 RX ADMIN — Medication 2000 UNITS: at 06:13

## 2021-06-12 RX ADMIN — MORPHINE SULFATE 2 MG: 4 INJECTION INTRAVENOUS at 15:04

## 2021-06-12 RX ADMIN — OMEPRAZOLE 40 MG: 20 CAPSULE, DELAYED RELEASE ORAL at 06:17

## 2021-06-12 RX ADMIN — DOCUSATE SODIUM 50 MG AND SENNOSIDES 8.6 MG 1 TABLET: 8.6; 5 TABLET, FILM COATED ORAL at 06:13

## 2021-06-12 RX ADMIN — METOCLOPRAMIDE 10 MG: 5 INJECTION, SOLUTION INTRAMUSCULAR; INTRAVENOUS at 06:13

## 2021-06-12 RX ADMIN — OXYCODONE HYDROCHLORIDE AND ACETAMINOPHEN 500 MG: 500 TABLET ORAL at 06:14

## 2021-06-12 RX ADMIN — LEVOTHYROXINE SODIUM 150 MCG: 0.15 TABLET ORAL at 06:14

## 2021-06-12 RX ADMIN — METOCLOPRAMIDE 10 MG: 5 INJECTION, SOLUTION INTRAMUSCULAR; INTRAVENOUS at 11:27

## 2021-06-12 RX ADMIN — SODIUM CHLORIDE 250 ML: 9 INJECTION, SOLUTION INTRAVENOUS at 00:41

## 2021-06-12 RX ADMIN — ERYTHROMYCIN LACTOBIONATE 500 MG: 500 INJECTION, POWDER, LYOPHILIZED, FOR SOLUTION INTRAVENOUS at 11:27

## 2021-06-12 RX ADMIN — ERYTHROMYCIN LACTOBIONATE 500 MG: 500 INJECTION, POWDER, LYOPHILIZED, FOR SOLUTION INTRAVENOUS at 17:37

## 2021-06-12 RX ADMIN — CYANOCOBALAMIN TAB 500 MCG 500 MCG: 500 TAB at 06:13

## 2021-06-12 RX ADMIN — POTASSIUM CHLORIDE 20 MEQ: 1500 TABLET, EXTENDED RELEASE ORAL at 06:14

## 2021-06-12 RX ADMIN — LIDOCAINE 2 PATCH: 50 PATCH TOPICAL at 23:01

## 2021-06-12 RX ADMIN — PRASUGREL 10 MG: 10 TABLET, FILM COATED ORAL at 06:14

## 2021-06-12 ASSESSMENT — ENCOUNTER SYMPTOMS
DIZZINESS: 0
FOCAL WEAKNESS: 0
HEARTBURN: 0
STRIDOR: 0
FLANK PAIN: 0
BLOOD IN STOOL: 0
CONSTIPATION: 0
HEADACHES: 0
COUGH: 0
DIAPHORESIS: 0
NECK PAIN: 0
SORE THROAT: 0
HALLUCINATIONS: 0
SHORTNESS OF BREATH: 0
SEIZURES: 0
NERVOUS/ANXIOUS: 0
WEIGHT LOSS: 1
FEVER: 0
WHEEZING: 0
NAUSEA: 1
BLURRED VISION: 0
LOSS OF CONSCIOUSNESS: 0
DIARRHEA: 0
CHILLS: 0
FALLS: 0
WEAKNESS: 1
VOMITING: 1
DEPRESSION: 0
MYALGIAS: 0
BACK PAIN: 0
SPEECH CHANGE: 0
ABDOMINAL PAIN: 0
SHORTNESS OF BREATH: 1
SENSORY CHANGE: 0
PALPITATIONS: 0
NERVOUS/ANXIOUS: 1

## 2021-06-12 ASSESSMENT — PAIN DESCRIPTION - PAIN TYPE
TYPE: SURGICAL PAIN
TYPE: ACUTE PAIN
TYPE: ACUTE PAIN;SURGICAL PAIN
TYPE: SURGICAL PAIN
TYPE: ACUTE PAIN
TYPE: SURGICAL PAIN;ACUTE PAIN
TYPE: ACUTE PAIN;CHRONIC PAIN

## 2021-06-12 ASSESSMENT — FIBROSIS 4 INDEX
FIB4 SCORE: 2.84
FIB4 SCORE: 2.84

## 2021-06-12 NOTE — PROGRESS NOTES
[facility] -  PROGRESS NOTE               Author: Cj Rivas M.D.  Collaborating Physician: Dr. Rivas  Date & Time: 6/12/2021  9:32 AM     HPI:  65 yo M PMH ESRD CCPD, HTN, anemia of CKD, CKD-MBD, HLD, and CAD who presents to ED with CC as above.  He has chronic intermittent N/V that he's been battling for a long time.  It leads to wide fluctuations in his po intake.  He also has severe CAD and non-cardiac atherosclerosis.  Recently he feel she has lost 45 lbs in the past month due to the N/V.  He's had a CTA in past that has revealed SMA steosis, but report was not available here at this institution.  Repeat imaging ordered by admit MD and he was admitted for further evaluation.  Has no abd pain and otherwise denies any associated F/C/CP/SOB.  No melena, hematochezia, hematemesis.  No HA, visual changes.    DAILY NEPHROLOGY SUMMARY:  6/05 - Consult done  6/06 - NAEO, +N/V; Unable to keep majority of food down  6/07 - sitting up in bed, still having poor intake vomited this am, PD UF: 461ml, had UGI: WNL, had large BM this am   6/08: sitting up in bed, feels miserable, weak and nauseated, for EGD today, discussed case with Dr. Rivas, dont believe PD is contributing factor as pt BUN is only mildly elevated and pt has been on PD for > 1 year, if GI workup neg could consider transitioning to HD to see if helps elevate symptoms, tolerated PD with UF: 450ml  6/09: Ambulating in room. C/o continued N/V and dehydration. S/p EGD w/bx's of stomach/duodenum.  318 mL net UF. GES today.  6/10: Nauseous with small amount of emesis this am. Does not feel he can tolerate food with GES this am. Primary RN at . 473 Net UF CCPD.   6/11: Unable to complete GES due to nausea. Continues to have nausea.   6/12; again unable to complete GES due to nausea; had a permacath placed but hemodialysis plan was held as became hypotensive and rapid response was called, received IV fluid boluses for  "resuscitation, cardiology was consulted, seen on hemodialysis this a.m., blood pressure better, is very weak      REVIEW OF SYSTEMS:    10 point ROS reviewed and is as per HPI/daily summary or otherwise negative    PMH/PSH/SH/FH: Reviewed and unchanged since admission note  CURRENT MEDICATIONS: Reviewed from admission to present day    VS:  /59   Pulse 81   Temp 36.6 °C (97.9 °F) (Temporal)   Resp 16   Ht 1.778 m (5' 10\")   Wt 53.9 kg (118 lb 13.3 oz)   SpO2 100%   BMI 17.05 kg/m²   Physical Exam  Nursing note reviewed.   Constitutional:       General: He is not in acute distress.     Appearance: Normal appearance. He is ill-appearing. He is not diaphoretic.      Comments: Cachectic    HENT:      Head: Normocephalic and atraumatic.   Neck:      Trachea: No tracheal tenderness.   Cardiovascular:      Rate and Rhythm: Normal rate.      Heart sounds: Murmur heard.   No friction rub. No gallop.       Comments: No edema  Pulmonary:      Effort: Pulmonary effort is normal. No respiratory distress.   Abdominal:      General: There is no distension.      Comments: PD Cath in place   Musculoskeletal:         General: No deformity.      Comments: L BKA w/prosthesis   Skin:     General: Skin is warm and dry.      Coloration: Skin is not jaundiced.      Findings: No rash.   Neurological:      Mental Status: He is alert and oriented to person, place, and time.      Motor: No atrophy.   Psychiatric:         Behavior: Behavior normal.     Fluids:  In: -   Out: 613     LABS:  Recent Labs     06/11/21  0634 06/11/21  1341 06/12/21  0059   SODIUM 131* 132* 135   POTASSIUM 4.0 4.1 3.9   CHLORIDE 92* 94* 101   CO2 26 24 19*   GLUCOSE 89 76 78   BUN 63* 64* 62*   CREATININE 11.38* 12.60* 10.55*   CALCIUM 8.5 8.3* 7.0*       IMPRESSION:  # ESRD   - Etiology likely 2/2 HTN   -was on CCPD now switched to HD this admission  #Cachexia, unexplained 40 pound weight loss, intractable nausea vomiting -  - Followed by GI and has had " extensive work up, upper GI: WNL , EGD 6/8    - Bx stomach and duodenum done - Path pending    -EGD essentially normal; no evidence of stricture, no erosive gastritis   -Unable to complete gastric emptying study x3   -On IV reglan and IV erythromycin x3 doses   - Considering outpatient Smart pill study  # HTN now hypotensive   -hold blood pressure medicines   #cardiomyopathy, chronic systolic heart failure, severe CAD, severe MR   -EF of 30% and severe MR.     - Needs cath pe cardiology once INR is under 1.5.  # Anemia of CKD   - Goal Hgb 10-11   - Stable  # CKD-MBD   - managed at outpt clinic    - hold phos binder for now with N/V  # Hypokalemia, improved    - KCL IV x 1 6/6   - continue PO KCL 20meq daily   # Hyponatremia, mild   - UF with HD as tolerated      PLAN:  -Switched from CCPD to hemodialysis to address uremia and see if this improves his GI symptoms -severe nausea and vomiting  - S/P tunneled cath placement 6/11/21; HD #1 today   -No more CCPD  -Pharmacy managing INR ; hold Coumadin until INR below 1.5 so that cardiology can proceed with heart cath as recommended by Dr. Altman from cardiology   - no dietary protein restrictions; Regular diet ok  - Dose all meds per ESRD  - No fluid restrictions at this time  - Nepro with meals  - hold phos binders   - Okay to bring Liquacel Protein supplement from home   - continue PO KCL daily

## 2021-06-12 NOTE — CARE PLAN
The patient is Watcher - Medium risk of patient condition declining or worsening    Shift Goals  Clinical Goals: adequate po intake. decrease nausea  Patient Goals: rest    Progress made toward(s) clinical / shift goals:        Problem: Knowledge Deficit - Standard  Goal: Patient and family/care givers will demonstrate understanding of plan of care, disease process/condition, diagnostic tests and medications  Outcome: Progressing     Problem: Hemodynamics  Goal: Patient's hemodynamics, fluid balance and neurologic status will be stable or improve  Outcome: Progressing     Problem: Fluid Volume  Goal: Fluid volume balance will be maintained  Outcome: Progressing     Problem: Fall Risk  Goal: Patient will remain free from falls  Outcome: Progressing       Patient is not progressing towards the following goals:

## 2021-06-12 NOTE — CONSULTS
Chart reviewed and patient attempted to be seen but not present.  Elevated troponin likely due to ESRD on HD, EF of 30% and severe MR.  He will need a cardiac catheterization eventually but his INR precludes this a this time.  Please hold his antioag and contact cardiology once his INR is under 1.5.

## 2021-06-12 NOTE — PROGRESS NOTES
Inpatient Anticoagulation Service Note    Date: 6/12/2021    Reason for Anticoagulation:  (Documented hx of arterial thrombosis )   Target INR: 2.0 to 3.0  [id number] VASc Score: 2  HAS-BLED Score: 4   Hemoglobin Value: (!) 9.7  Hematocrit Value: (!) 30.1  Lab Platelet Value: (!) 128    INR from last 7 days     Date/Time INR Value    06/12/21 00:59:01  (!) 5.31    06/11/21 0634  (!) 2.6    06/10/21 04:42:01  (!) 3.23    06/09/21 0702  (!) 2.98    06/08/21 0550  (!) 3.31    06/07/21 06:25:01  (!) 3.33    06/06/21 07:30:01  (!) 2.73        Dose from last 7 days     Date/Time Dose (mg)    06/12/21 0900  0    06/11/21 1621  1    06/10/21 1249  0    06/09/21 1324  1.25    06/07/21 1206  1.25    06/06/21 1246  2.5    06/05/21 1212  2.5        Average Dose (mg):  (Documented warfarin home dose:2.5mg po daily. )  Significant Interactions: Antibiotics, Antiplatelet Medications, Aspirin, Proton Pump Inhibitor, Statin, Thyroid Medications, Other (Comments) (erythromycin)  Bridge Therapy: No     Reversal Agent Administered: Not Applicable    Comments:   67 y/o M w/ PHMx ESRD, CAD s/p multiple stents, valvular heart disease, HTN, HLD presented 6/4/2021 to [facility] with complaint of nausea, vomiting poor oral intake and weight loss of 45 pounds over the past month; then subsequently transferred to [facility] w/ possible SMA stenosis.  Takes Effient in addition to warfarin PTA for his vascular disease. INR therapeutic on admit.  06/04/21 CT: AP: No acute abdominal or pelvic findings.      Plan:  Warfarin 0mg po tonight , INR in AM   Education Material Provided?: No  Pharmacist suggested discharge dosing: Warfarin 1-1.5 mg po daily , follow up with anticoagulation clinic after discharge.      Godwin Joya, PharmD, BCPS

## 2021-06-12 NOTE — ASSESSMENT & PLAN NOTE
Multimodal pain management - Lidocaine patches, Neurontin, Oyxcodone and IV Morphine prn, stop Tylenol

## 2021-06-12 NOTE — PROGRESS NOTES
[facility] Medicine Daily Progress Note    Date of Service  6/12/2021    Chief Complaint  66 y.o. male presented 6/4/2021 with nausea and vomiting    Hospital Course  Admitted with nausea and vomiting, weight loss.  He had a CT abdomen and pelvis done in an outside facility which showed possible SMA stenosis.  CT scan of the abdomen pelvis here was noted to be negative.  Gastroenterology was consulted case, EGD was done which showed gastric polyps, nonerosive gastritis.  Patient was unable to do a gastric emptying scan.  Patient also with known history of end-stage renal disease on peritoneal dialysis.  It was felt that this may be contributing to his nausea and vomiting.  A right internal jugular hemodialysis catheter was placed on 6/11/2021, and hemodialysis has been initiated.  Also apparently developed chest pain, his troponin was markedly elevated at 1500, cardiology has been consulted on the case.  Known history of CAD with stents, and chronic systolic CHF.    Interval Problem Update  Nausea and vomiting -for HIDA scan  Chest pain - described as burning   HTN - sbp 105  Prolonged QT -discussed case with Gastroenterology, will decrease IV Reglan dose  CHF - EF 30%, probnp [id number]    Updates given and plan of care discussed with patient's spouse.    Consultants/Specialty  Nephrology  Gastroenterology  Cardiology    Code Status  Full Code    Disposition  TBD    Review of Systems  Review of Systems   Constitutional: Positive for weight loss. Negative for chills, diaphoresis, fever and malaise/fatigue.   HENT: Negative for hearing loss, nosebleeds and sore throat.    Eyes: Negative for blurred vision.   Respiratory: Negative for cough, shortness of breath and wheezing.    Cardiovascular: Positive for chest pain. Negative for palpitations and leg swelling.   Gastrointestinal: Positive for nausea and vomiting. Negative for abdominal pain, diarrhea and heartburn.   Genitourinary: Negative for dysuria, flank pain and  hematuria.   Musculoskeletal: Negative for back pain, joint pain, myalgias and neck pain.   Skin: Negative for rash.   Neurological: Positive for weakness. Negative for dizziness, sensory change, speech change, focal weakness and headaches.   Psychiatric/Behavioral: Negative for depression. The patient is nervous/anxious.         Physical Exam  Temp:  [35.9 °C (96.6 °F)-36.6 °C (97.9 °F)] 35.9 °C (96.6 °F)  Pulse:  [66-94] 94  Resp:  [10-20] 16  BP: ()/(43-60) 105/58  SpO2:  [97 %-100 %] 100 %    Physical Exam  Vitals and nursing note reviewed.   Constitutional:       Appearance: He is ill-appearing.      Comments: Cachetic   HENT:      Head: Normocephalic and atraumatic.      Mouth/Throat:      Mouth: Mucous membranes are moist.   Eyes:      Extraocular Movements: Extraocular movements intact.      Conjunctiva/sclera: Conjunctivae normal.      Pupils: Pupils are equal, round, and reactive to light.   Cardiovascular:      Rate and Rhythm: Normal rate and regular rhythm.      Heart sounds: Murmur heard.     Pulmonary:      Effort: Pulmonary effort is normal.      Breath sounds: Rales present.   Abdominal:      General: Bowel sounds are normal. There is no distension.      Palpations: Abdomen is soft.      Tenderness: There is no abdominal tenderness. There is no guarding or rebound.      Comments: PD catheter    Musculoskeletal:      Right lower leg: No edema.      Left lower leg: No edema.      Comments: Left BKA    Skin:     General: Skin is warm and dry.   Neurological:      General: No focal deficit present.      Mental Status: He is alert and oriented to person, place, and time.      Cranial Nerves: No cranial nerve deficit.   Psychiatric:         Mood and Affect: Mood is anxious.         Fluids    Intake/Output Summary (Last 24 hours) at 6/12/2021 1508  Last data filed at 6/12/2021 1000  Gross per 24 hour   Intake --   Output 100 ml   Net -100 ml       Laboratory  Recent Labs     06/10/21  1318  06/11/21  0634 06/12/21  0059   WBC 7.2 11.1* 10.7   RBC 3.18* 3.19* 2.92*   HEMOGLOBIN 10.2* 10.6* 9.7*   HEMATOCRIT 32.3* 32.9* 30.1*   .6* 103.1* 103.1*   MCH 32.1 33.2* 33.2*   MCHC 31.6* 32.2* 32.2*   RDW 61.4* 61.8* 62.8*   PLATELETCT 128* 152* 128*   MPV 10.8 11.1 10.9     Recent Labs     06/11/21  0634 06/11/21  1341 06/12/21  0059   SODIUM 131* 132* 135   POTASSIUM 4.0 4.1 3.9   CHLORIDE 92* 94* 101   CO2 26 24 19*   GLUCOSE 89 76 78   BUN 63* 64* 62*   CREATININE 11.38* 12.60* 10.55*   CALCIUM 8.5 8.3* 7.0*     Recent Labs     06/10/21  0442 06/11/21  0634 06/12/21  0059   INR 3.23* 2.60* 5.31*         Recent Labs     06/12/21  0750   TRIGLYCERIDE 83   HDL 36*   LDL 30       Imaging  DX-CHEST-PORTABLE (1 VIEW)   Final Result      No acute cardiopulmonary abnormality.      IR-ROSALES,GROSHONG PLACEMENT >5   Final Result      1. ULTRASOUND AND FLUOROSCOPIC GUIDED PLACEMENT OF A Right INTERNAL JUGULAR 14.5 Syriac HemoSplit TUNNELED DIALYSIS CATHETER.      2. THE HEMODIALYSIS CATHETER MAY BE USED IMMEDIATELY AS CLINICALLY INDICATED. FLUSHES PER PROTOCOL.      3. FOCAL TORTUOSITY AND NARROWING AT THE DISTAL RIGHT INTERNAL JUGULAR VEIN-RIGHT BRACHIOCEPHALIC JUNCTION.      DX-UPPER GI-SERIES WITH KUB   Final Result      1.  Unremarkable upper GI exam.         EC-ECHOCARDIOGRAM COMPLETE W/O CONT   Final Result      CT-CHEST (THORAX) W/O   Final Result      1.  Abnormal lung parenchyma      2.  Specifically, there are multifocal bilateral airspace process, 14 mm right middle lobe groundglass density, posterior aspect right lower lobe 12 mm area of spiculation and probably underlying emphysema.      3.  Pulmonary findings are nonspecific and could be inflammation, infection, or postinflammatory scarring. Neoplasm is less likely.      4.  Dense aortic and branch vessel atherosclerotic plaque      5.  Pulmonary artery hypertension      6.  Very small bilateral pleural effusion      Fleischner Society pulmonary  nodule recommendations:      Ground Glass and Part Solid: No routine follow-up      Comments: In certain suspicious nodules less than 6 mm, consider follow-up at 2 and 4 years. If solid component(s) or growth develops, consider resection.      Note: These recommendations do not apply to lung cancer screening, patients with immunosuppression, or patients with known primary cancer.      Fleischner Society 2017 Guidelines for Management of Incidentally Detected Pulmonary Nodules in Adults      OUTSIDE IMAGES-US ABDOMEN   Final Result      OUTSIDE IMAGES-DX CHEST   Final Result      CT-ABDOMEN-PELVIS WITH   Final Result   Addendum 1 of 1   On further review, the SMA and celiac axis are noted to be patent.    Contrast is difficult to confirm within the diminutive and markedly    atherosclerotic renal arteries.      Final      1.  No acute abdominal or pelvic findings.   2.  Bilaterally small atrophic appearing kidneys consistent with renal failure, with a dialysis catheter noted in the pelvic cavity.   3.  Colonic diverticulosis without diverticulitis.      NM-BILIARY (HIDA) SCAN WITH CCK    (Results Pending)        Assessment/Plan  * Intractable nausea and vomiting- (present on admission)  Assessment & Plan  EGD - Gastric polyps, Non-erosive gastritis. 6/8/2021  IV Erythromycin  Decrease IV Reglan to 5 mg TID  For HIDA scan      Chest pain  Assessment & Plan  Multimodal pain management - scheduled Tylenol, Lidocaine patches, start Neurontin, Oyxcodone and IV Morphine prn    Elevated troponin- (present on admission)  Assessment & Plan  Effient  Cardiology consulted    Frailty syndrome in geriatric patient- (present on admission)  Assessment & Plan  Consult Palliative care        ESRD (end stage renal disease) on dialysis (HCC)- (present on admission)  Assessment & Plan  Previously on PD  Right Internal Jugular Tunneled Hemodialysis Catheter Placement. 6/11/2021  HD per Nephrology      Thrombocytopenia (HCC)- (present on  admission)  Assessment & Plan  Follow cbc    Anemia- (present on admission)  Assessment & Plan  Follow cbc    Supratherapeutic INR  Assessment & Plan  Hold Coumadin  Monitor for signs of bleeding    Hyponatremia- (present on admission)  Assessment & Plan  Follow bmp    Hypokalemia- (present on admission)  Assessment & Plan  Kdur, follow bmp    Prolonged QT interval- (present on admission)  Assessment & Plan  EKG 6/12/2021 QTc - 598  Decrease IV Reglan dose  Telemetry monitoring      Systolic CHF, chronic (HCC)- (present on admission)  Assessment & Plan  Holding Coreg, Isordil    PVD (peripheral vascular disease) (HCC)- (present on admission)  Assessment & Plan  s/p left BKA  Coumadin    Non-rheumatic mitral regurgitation- (present on admission)  Assessment & Plan  Severe     CAD (coronary artery disease)- (present on admission)  Assessment & Plan  History of stents  Effient, Lipitor, Ranexa  Holding Coreg and Isordil due to low blood pressure      Essential hypertension- (present on admission)  Assessment & Plan  Hold Coreg, Isordil    Anemia in chronic kidney disease (CKD)- (present on admission)  Assessment & Plan  Follow cbc    Anxiety- (present on admission)  Assessment & Plan  Xanax prn    Severe protein-calorie malnutrition (HCC)- (present on admission)  Assessment & Plan  Nutrition consult    Superior mesenteric artery stenosis (HCC)- (present on admission)  Assessment & Plan  CT negative    C. difficile colitis- (present on admission)  Assessment & Plan  History of    Status post below knee amputation of left lower extremity- (present on admission)  Assessment & Plan  PT and OT    Mixed hyperlipidemia- (present on admission)  Assessment & Plan  Lipitor    Hypothyroid- (present on admission)  Assessment & Plan  Synthroid, check TSH       VTE prophylaxis: Coumadin

## 2021-06-12 NOTE — PROGRESS NOTES
Inpatient Anticoagulation Service Note    Date: 6/11/2021  Reason for Anticoagulation:  (PVD, Documented hx of arterial thrombosis )   [id number] VASc Score: 2  HAS-BLED Score: 4    Hemoglobin Value: (!) 10.6  Hematocrit Value: (!) 32.9  Lab Platelet Value: (!) 152  Target INR: 2.0 to 3.0    INR from last 7 days     Date/Time INR Value    06/11/21 0634  (!) 2.6    06/10/21 04:42:01  (!) 3.23    06/09/21 0702  (!) 2.98    06/08/21 0550  (!) 3.31    06/07/21 06:25:01  (!) 3.33    06/06/21 07:30:01  (!) 2.73    06/05/21 0744  (!) 2.31        Dose from last 7 days     Date/Time Dose (mg)    06/11/21 1621  1    06/10/21 1249  0    06/09/21 1324  1.25    06/07/21 1206  1.25    06/06/21 1246  2.5    06/05/21 1212  2.5    06/04/21 2152  2.5        Average Dose (mg):  (Documented warfarin home dose:2.5mg po daily. )  Significant Interactions: Aspirin, Antiplatelet Medications, Proton Pump Inhibitor, Statin, Thyroid Medications, Other (Comments) (erythromycin)  Bridge Therapy: No   Reversal Agent Administered: Not Applicable    Comments: INR trending down after holding a dose yesterday. new DDI with erythromycin, very poor PO intake, went for HD cath placement today, ok to resume warfarin per MD. Will give a lower dose today to prevent INR from trending down further. H/H low but stable.     Plan: warfarin 1 mg PO Qday  Education Material Provided?: No  Pharmacist suggested discharge dosing:  tentatively plan on continuing home dose of 2.5mg po daily with repeat INR within 2-3 days of discharge.      Triny Hall, RebecaD

## 2021-06-12 NOTE — CARE PLAN
Problem: Hemodynamics  Goal: Patient's hemodynamics, fluid balance and neurologic status will be stable or improve  Outcome: Not Progressing  Note: Pt continuing with hypotensive pressure. Bolus ordered, Rapid called.       Shift Goals  Clinical Goals: adequate po intake. decrease nausea  Patient Goals: rest    Progress made toward(s) clinical / shift goals:  Nausea controlled    Patient is not progressing towards the following goals: Hemodynamics

## 2021-06-12 NOTE — PROGRESS NOTES
Pt vitals taken pt BP 86/54, assessed cath site noticed bright red blood oozing and applied direct pressure. Pt began to c/o shortness of breath and substernal chest pressure. Rapid nurse and Dr. Cespedes called to bedside. Additional orders obtained. 250cc bolus given, BP 93/51 pulse 85 SPO2 100% on 2L. Pt A&Ox4

## 2021-06-12 NOTE — PROGRESS NOTES
"  Gastroenterology (GIC) Progress Note     Author: SURESH Granda  Date & Time Created: 6/12/2021 12:56 PM    Chief Complaint:  Nausea and vomiting    Interval History:  6/9/2021: Feels the same, still with nausea and vomiting. Denied abdominal pain.  Stools are semi-solid but not liquid.  Denied hematochezia or melena.  Denied further modifying factors, associated symptoms or timing issues.    6/10/2021: Stable, slightly less vomiting. No abdominal pain. Gastric emptying study to be done today.    6/11/2021: Still vomiting. Failed GES yesterday due to vomiting. To get Hemodialysis port today. HIDA scan has been ordered.  Denied further modifying factors, associated symptoms or timing issues.    6/12/2021: Chest pain and shortness of breath overnight.  Noted to have elevated troponin and BNP.  Transferred to the telemetry floor with mild hypotension.  Underwent hemodialysis catheter placement yesterday with continued mild oozing of the cath site.  Underwent hemodialysis this morning.  Nausea and vomiting has improved since yesterday.  HIDA scan still has not been done.     History of Present Illness per consult note:    \"He is a 66-year-old male patient seen in consultation for nausea, vomiting, weight loss.  The patient was admitted on 6/4/2021 with complaints of nausea and vomiting with poor oral intake and weight loss.  The patient states that over the last 4 to 6 weeks he has had problems with intermittent nausea, vomiting that can occur randomly.  He states that if he vomits, often it will appear liquid and bilious in color.  However, at times he can regurgitate liquid or food from his stomach.  He cannot accurately tell me how often this occurs despite several questions asked in different ways about his vomiting.  It does sound like at times he can have food that sticks in his midesophagus when swallowing that he can regurgitate such as solid food, but this is not consistent.  He does not seem to have " "problems with liquids or the swallowing mechanism.  He states he is lost 35 to 45 pounds over the last month to month and a half.  He denies abdominal pain, fevers, chills, melena, hematochezia.  He denies GERD or heartburn symptoms.  The patient had similar symptoms of nausea and vomiting 2019 seen by Dr. Butch Hunter with GI consultants.  EGD performed by Dr. Scott Arciniega at that time demonstrated hiatal hernia and no other abnormalities.  Prior to that the patient did have what sounds to be bleeding esophageal and gastric ulcers, although I do not have primary data of this.  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  He also underwent previous left below the knee amputation related to his vascular disease.  He also at one point had imaging summary that suggested possible superior mesenteric artery stenosis, but the patient tells me that vascular surgery saw him and felt that he did not have this condition and no intervention was done at that time.     Labs and imaging reviewed.  CBC consistent with RBC 12.0, hematocrit 22.0, RDW 63.1, platelets 151.  Sed rate is normal 6/4/2021.  CMP with sodium 132, potassium 2.8, chloride 89, creatinine 12.70.  LFT 6/4/2021 are normal.  INR today 3.33  on Coumadin.  CT abdomen and pelvis with contrast on 6/4/2021 demonstrated no acute abdominal or pelvic findings.  SMA and celiac axis noted to be patent, although contrast is difficult to confirm within the diminutive and markedly atherosclerotic renal arteries.  Upper GI series with KUB demonstrated normal-appearing esophagus without stricture, esophagitis, or reflux.  Stomach and duodenum also appeared normal.\"       Review of Systems:  Review of Systems   Constitutional: Positive for weight loss. Negative for chills and fever.   HENT: Negative for sore throat.    Respiratory: " Positive for shortness of breath. Negative for cough and stridor.    Cardiovascular: Positive for chest pain. Negative for palpitations.   Gastrointestinal: Positive for nausea and vomiting. Negative for blood in stool, constipation, diarrhea and melena.   Genitourinary: Negative for flank pain and hematuria.   Musculoskeletal: Negative for falls and neck pain.   Skin: Negative for itching and rash.   Neurological: Negative for seizures and loss of consciousness.   Psychiatric/Behavioral: Negative for hallucinations. The patient is not nervous/anxious.    All other systems reviewed and are negative.      Physical Exam:  Physical Exam  Vitals and nursing note reviewed. Exam conducted with a chaperone present.   Constitutional:       General: He is not in acute distress.     Appearance: Normal appearance. He is ill-appearing. He is not toxic-appearing or diaphoretic.   HENT:      Head: Normocephalic and atraumatic.      Mouth/Throat:      Mouth: Mucous membranes are moist.      Pharynx: Oropharynx is clear. No posterior oropharyngeal erythema.   Eyes:      General: No scleral icterus.     Extraocular Movements: Extraocular movements intact.      Conjunctiva/sclera: Conjunctivae normal.      Pupils: Pupils are equal, round, and reactive to light.   Cardiovascular:      Rate and Rhythm: Normal rate and regular rhythm.      Pulses: Normal pulses.      Heart sounds: Normal heart sounds.   Pulmonary:      Effort: Pulmonary effort is normal. No respiratory distress.      Breath sounds: Normal breath sounds. No stridor.   Abdominal:      General: Abdomen is flat. Bowel sounds are normal. There is no distension.      Palpations: Abdomen is soft.      Tenderness: There is no abdominal tenderness. There is no rebound.   Musculoskeletal:         General: Deformity present. Normal range of motion.      Cervical back: Normal range of motion and neck supple.      Comments: Left BKA   Skin:     General: Skin is warm and dry.       Coloration: Skin is not jaundiced or pale.   Neurological:      General: No focal deficit present.      Mental Status: He is alert and oriented to person, place, and time.      Coordination: Coordination normal.   Psychiatric:         Thought Content: Thought content normal.         Judgment: Judgment normal.         Labs:          Recent Labs     06/10/21  0442 06/10/21  0442 06/11/21  0634 21  1341 21  1546 21  0059   SODIUM 129*   < > 131* 132*  --  135   POTASSIUM 3.4*   < > 4.0 4.1  --  3.9   CHLORIDE 89*   < > 92* 94*  --  101   CO2 26   < > 26 24  --  19*   BUN 62*   < > 63* 64*  --  62*   CREATININE 11.84*   < > 11.38* 12.60*  --  10.55*   MAGNESIUM  --   --   --  2.5 2.5  --    PHOSPHORUS 4.9*  --  4.9* 5.1*  --   --    CALCIUM 8.6   < > 8.5 8.3*  --  7.0*    < > = values in this interval not displayed.     Recent Labs     211 21   ALTSGPT  --   --  118*   ASTSGOT  --   --  59*   ALKPHOSPHAT  --   --  93   TBILIRUBIN  --   --  0.6   GLUCOSE 89 76 78     Recent Labs     06/10/21  0442 06/11/21  0634 21   RBC 3.18* 3.19* 2.92*   HEMOGLOBIN 10.2* 10.6* 9.7*   HEMATOCRIT 32.3* 32.9* 30.1*   PLATELETCT 128* 152* 128*   PROTHROMBTM 34.0* 27.0* 47.1*   INR 3.23* 2.60* 5.31*     Recent Labs     06/10/21  0442 06/11/21  0634 21   WBC 7.2 11.1* 10.7   NEUTSPOLYS 67.40 74.80*  --    LYMPHOCYTES 20.70* 14.80*  --    MONOCYTES 9.90 8.30  --    EOSINOPHILS 1.30 1.30  --    BASOPHILS 0.30 0.30  --    ASTSGOT  --   --  59*   ALTSGPT  --   --  118*   ALKPHOSPHAT  --   --  93   TBILIRUBIN  --   --  0.6     Hemodynamics:  Temp (24hrs), Av.4 °C (97.5 °F), Min:35.9 °C (96.6 °F), Max:36.6 °C (97.9 °F)  Temperature: 35.9 °C (96.6 °F)  Pulse  Av.8  Min: 57  Max: 105   Blood Pressure : 105/58     Respiratory:    Respiration: 16, Pulse Oximetry: 100 %           Fluids:    Intake/Output Summary (Last 24 hours) at 2021 0906  Last data filed at  6/9/2021 0715  Gross per 24 hour   Intake 125 ml   Output 358 ml   Net -233 ml        GI/Nutrition:  Orders Placed This Encounter   Procedures   • Diet NPO     Standing Status:   Standing     Number of Occurrences:   1     Order Specific Question:   Restrict to:     Answer:   Sips with Medications [3]     Medical Decision Making, by Problem:  Active Hospital Problems    Diagnosis    • *Failure to thrive in adult [R62.7]    • Systolic CHF, chronic ([facility]) [I50.22]    • Superior mesenteric artery stenosis ([facility]) [K55.1]    • Severe protein-calorie malnutrition ([facility]) [E43]    • PVD (peripheral vascular disease) ([facility]) [I73.9]    • Anxiety [F41.9]    • ACP (advance care planning) [Z71.89]    • Non-rheumatic mitral regurgitation [I34.0]    • Hypothyroid [E03.9]    • CAD (coronary artery disease) [I25.10]    • Mixed hyperlipidemia [E78.2]    • Essential hypertension [I10]    • Anemia in chronic kidney disease (CKD) [N18.9, D63.1]    • ESRD on peritoneal dialysis ([facility]) [N18.6, Z99.2]    • C. difficile colitis [A04.72]    • Intractable nausea and vomiting [R11.2]    • Atherosclerosis of right lower extremity with rest pain ([facility]) [I70.221]    • Status post below knee amputation of left lower extremity [Z89.512]    • Testicular cancer ([facility]) [C62.90]      Problems:  1.  Nausea and vomiting, no evidence of stricture by EGD; cortisol was normal on 6/4/2021  2.  Non-erosive gastritis, biopsies negative for active inflammation or H. Pylori  3.  Weight loss, duodenal biopsy negative for celiac sprue  4.  Protein calorie malnutrition  5.  Gastric polyps pathology consistent with fundic gland polyps  6.  Peripheral vascular disease status post previous bypass surgery and left below the knee amputation currently on Coumadin  7.  End-stage renal disease on peritoneal dialysis now on hemodialysis  8.  History of possible superior mesenteric artery stenosis-no current CT evidence of this  9.  Hyperlipidemia  10.  Hypothyroidism  11.   Coronary artery disease-elevated troponin last night.    12.  Macrocytic anemia in chronic kidney disease  13.  COVID negative on 6/4  14.  Complex patient with multiple medical comorbidities with increased complexity medical decision making    Recommendations:  1.  Continue prilosec to 40mg QD  2.    Follow-up results of HIDA scan.  If this shows severe delayed filling or significantly low ejection fraction, can consider surgical consultation for elective laparoscopic cholecystectomy  3.  If nausea and vomiting persist, and HIDA scan is normal, recommend outpatient follow-up with GI consultants for possible outpatient smart pill study.  Would need patency capsule prior to smart pill.  4.  Antiemetics per primary team     **  GI WILL SIGN OFF / STAND BY - PLEASE CALL IF ANY CONCERNS  **       Quality-Core Measures

## 2021-06-12 NOTE — PROGRESS NOTES
Josh from Lab called with critical result of Troponin 1502 at 0048. Dr. Cespedes notified of critical lab result at 0050.

## 2021-06-12 NOTE — CODE DOCUMENTATION
Primary RN was holding pressure on patients oozing HD cath site. Pt began c/o chest pain and shortness of breath. Rapid response MD already at bedside a few rooms down and updated by primary RN. This RN arrived and pt still c/o chest pain and posterior neck pain, xray ordered and on way, ekg at bedside. EKG shows no changes from prior ekg. Pt laid flat for hypotension, states pain improving with position change. Lab at bedside.

## 2021-06-12 NOTE — PROGRESS NOTES
Requested to bedside by overnight nurse for new onset SOB. Pt is a 67yo M w/ ESRD, CAD (w/ stents x6), valvular heart disease, HTn, HLD, and CHF. Initially admitted on 6/4/21 as transfer from [facility] for 45lb weight loss, nausea, vomiting, and failure to thrive. Yesterday, pt with HD cath placement RUQ chest. Pt also with RRT called yesterday for hypotension following procedure and was given 500cc bolus with improvement of bp from 80/46 to 90/52. Request for physician at bedside tonight due to complaints of SOB. On arrival, pt saturating well on 2L NC. Was noted to have some worsening hypotension of 86/54. On questioning, pt does admit to some substernal chest pain that is 4-5/10 in intensity and radiates to the back. He describes the pain as constant and feels like a burning sensation. Pt reports that he has history of MI in the past and follows w/ Dr. Romero from [facility]. He additionally reports that he has had 6 stents in the past, most recently in December 2020 where he had 2 placed. He reports that this burning chest pain is similar to the chest pain he had in the past when he had his MI. He reports fatigue, but denies any fever, chills, cough, n/v at this time.     Physical exam:  Vitals: BP 86/54, 100% on 2l NC. Other vitals unremarkable  Constitutional: appears fatigue, mild distress, malnourished  ENT: Grossly normal  Cardiac: Murmur, normal rate/rythm  PUlm: Shallow breaths, but clear to ascultation  Abd: Peritoneal cath noted  MSK: L BKA. HD cath RUQ chest  Skin: Grossly normal  Neuro: AAOx4, no appreciable focal neuro deficits    - CBC, CMP, Trop, BNP, Ddimer ordered  - CXR performed w/o obvious acute findings  - order ECG  - c/w Midodrine therapy  - will give additional 250cc NS bolus and monitor BP and respiratory status  - holding off on analgesics for chest pain due to hypotension    Update: 3:02am  Troponin 1502.   - have requested Cardiology consult from Dr. Garza.  Awaiting input.   - pt already on Aspirin 81mg and Atorvastatin 40mg. Will increase Atorvastatin 40mg -> 80mg  --- Will hold further aspirin for now as pt with elevated INR  - INR elevated at 5.31, therefore holding off on AC. Also, trop elevated may be related due persistent hypotension.   - TTE performed on 6/6, hold off on repeat  - Trend troponin  - A1c and lipid panel

## 2021-06-12 NOTE — PROGRESS NOTES
Pt Permcath site oozing, direct pressure and sand bags used, controlled after 20 minutes, consult with rapid nurse recommends keeping dressing in place to help clotting. Pt is still hypotensive 91/52, A&Ox4, denies shortness of breath. Dr. Cespedes notified.

## 2021-06-12 NOTE — PROGRESS NOTES
Received bedside report from RN, pt care assumed, VSS; BP soft, pt assessment complete. Pt AAOx4, no c/o pain at this time. No signs of acute distress noted at this time. POC discussed with pt and verbalizes no questions. Pt denies any additional needs at this time. Bed in lowest position, pt educated on fall risk and verbalized understanding, call light within reach, hourly rounding initiated.

## 2021-06-12 NOTE — PROGRESS NOTES
[facility] Services Progress Note    Hemodialysis treatment ordered today per Dr. Rivas x 2 hours. Treatment initiated at 0745 and ended at 0945.    Pt tolerated treatment; see paper flow sheets for details.    Net UF 0 mL as ordered by Dr Rivas.    Post tx, CVC flushed with saline then locked with heparin 1000 units/mL per designated amount in each wing then clamped and capped. Aspirate heparin prior to next CVC use.    Report given to Primary RN.

## 2021-06-13 PROBLEM — R79.89 ELEVATED LFTS: Status: ACTIVE | Noted: 2021-06-13

## 2021-06-13 PROBLEM — K82.8 DYSKINESIA OF GALLBLADDER: Status: ACTIVE | Noted: 2021-06-13

## 2021-06-13 LAB
ALBUMIN SERPL BCP-MCNC: 2.6 G/DL (ref 3.2–4.9)
ALBUMIN/GLOB SERPL: 1.4 G/DL
ALP SERPL-CCNC: 476 U/L (ref 30–99)
ALT SERPL-CCNC: 934 U/L (ref 2–50)
ANION GAP SERPL CALC-SCNC: 17 MMOL/L (ref 7–16)
ANISOCYTOSIS BLD QL SMEAR: ABNORMAL
AST SERPL-CCNC: 652 U/L (ref 12–45)
BASOPHILS # BLD AUTO: 0.9 % (ref 0–1.8)
BASOPHILS # BLD: 0.09 K/UL (ref 0–0.12)
BILIRUB SERPL-MCNC: 0.9 MG/DL (ref 0.1–1.5)
BUN SERPL-MCNC: 58 MG/DL (ref 8–22)
CALCIUM SERPL-MCNC: 8.4 MG/DL (ref 8.5–10.5)
CHLORIDE SERPL-SCNC: 97 MMOL/L (ref 96–112)
CO2 SERPL-SCNC: 20 MMOL/L (ref 20–33)
CREAT SERPL-MCNC: 10.3 MG/DL (ref 0.5–1.4)
EOSINOPHIL # BLD AUTO: 0.09 K/UL (ref 0–0.51)
EOSINOPHIL NFR BLD: 0.9 % (ref 0–6.9)
ERYTHROCYTE [DISTWIDTH] IN BLOOD BY AUTOMATED COUNT: 65.5 FL (ref 35.9–50)
GLOBULIN SER CALC-MCNC: 1.9 G/DL (ref 1.9–3.5)
GLUCOSE SERPL-MCNC: 44 MG/DL (ref 65–99)
HCT VFR BLD AUTO: 32.8 % (ref 42–52)
HGB BLD-MCNC: 10.2 G/DL (ref 14–18)
INR PPP: 2.84 (ref 0.87–1.13)
LYMPHOCYTES # BLD AUTO: 0.89 K/UL (ref 1–4.8)
LYMPHOCYTES NFR BLD: 8.8 % (ref 22–41)
MACROCYTES BLD QL SMEAR: ABNORMAL
MANUAL DIFF BLD: NORMAL
MCH RBC QN AUTO: 33.3 PG (ref 27–33)
MCHC RBC AUTO-ENTMCNC: 31.1 G/DL (ref 33.7–35.3)
MCV RBC AUTO: 107.2 FL (ref 81.4–97.8)
MONOCYTES # BLD AUTO: 0.18 K/UL (ref 0–0.85)
MONOCYTES NFR BLD AUTO: 1.8 % (ref 0–13.4)
MORPHOLOGY BLD-IMP: NORMAL
NEUTROPHILS # BLD AUTO: 8.85 K/UL (ref 1.82–7.42)
NEUTROPHILS NFR BLD: 87.6 % (ref 44–72)
NRBC # BLD AUTO: 0 K/UL
NRBC BLD-RTO: 0 /100 WBC
PLATELET # BLD AUTO: 118 K/UL (ref 164–446)
PLATELET BLD QL SMEAR: NORMAL
PMV BLD AUTO: 11.5 FL (ref 9–12.9)
POTASSIUM SERPL-SCNC: 4.9 MMOL/L (ref 3.6–5.5)
PROT SERPL-MCNC: 4.5 G/DL (ref 6–8.2)
PROTHROMBIN TIME: 29 SEC (ref 12–14.6)
RBC # BLD AUTO: 3.06 M/UL (ref 4.7–6.1)
RBC BLD AUTO: PRESENT
SODIUM SERPL-SCNC: 134 MMOL/L (ref 135–145)
WBC # BLD AUTO: 10.1 K/UL (ref 4.8–10.8)

## 2021-06-13 PROCEDURE — 85027 COMPLETE CBC AUTOMATED: CPT

## 2021-06-13 PROCEDURE — A9270 NON-COVERED ITEM OR SERVICE: HCPCS | Performed by: NURSE PRACTITIONER

## 2021-06-13 PROCEDURE — 5A1D70Z PERFORMANCE OF URINARY FILTRATION, INTERMITTENT, LESS THAN 6 HOURS PER DAY: ICD-10-PCS | Performed by: INTERNAL MEDICINE

## 2021-06-13 PROCEDURE — 99233 SBSQ HOSP IP/OBS HIGH 50: CPT | Performed by: FAMILY MEDICINE

## 2021-06-13 PROCEDURE — 90935 HEMODIALYSIS ONE EVALUATION: CPT

## 2021-06-13 PROCEDURE — 700102 HCHG RX REV CODE 250 W/ 637 OVERRIDE(OP): Performed by: FAMILY MEDICINE

## 2021-06-13 PROCEDURE — 700105 HCHG RX REV CODE 258: Performed by: INTERNAL MEDICINE

## 2021-06-13 PROCEDURE — 99222 1ST HOSP IP/OBS MODERATE 55: CPT | Performed by: INTERNAL MEDICINE

## 2021-06-13 PROCEDURE — 700111 HCHG RX REV CODE 636 W/ 250 OVERRIDE (IP): Performed by: FAMILY MEDICINE

## 2021-06-13 PROCEDURE — 80053 COMPREHEN METABOLIC PANEL: CPT

## 2021-06-13 PROCEDURE — 700111 HCHG RX REV CODE 636 W/ 250 OVERRIDE (IP): Mod: JG | Performed by: INTERNAL MEDICINE

## 2021-06-13 PROCEDURE — 770020 HCHG ROOM/CARE - TELE (206)

## 2021-06-13 PROCEDURE — 85610 PROTHROMBIN TIME: CPT

## 2021-06-13 PROCEDURE — 700102 HCHG RX REV CODE 250 W/ 637 OVERRIDE(OP): Performed by: STUDENT IN AN ORGANIZED HEALTH CARE EDUCATION/TRAINING PROGRAM

## 2021-06-13 PROCEDURE — 700111 HCHG RX REV CODE 636 W/ 250 OVERRIDE (IP)

## 2021-06-13 PROCEDURE — A9270 NON-COVERED ITEM OR SERVICE: HCPCS | Performed by: INTERNAL MEDICINE

## 2021-06-13 PROCEDURE — A9270 NON-COVERED ITEM OR SERVICE: HCPCS | Performed by: FAMILY MEDICINE

## 2021-06-13 PROCEDURE — 85007 BL SMEAR W/DIFF WBC COUNT: CPT

## 2021-06-13 PROCEDURE — 700102 HCHG RX REV CODE 250 W/ 637 OVERRIDE(OP): Performed by: INTERNAL MEDICINE

## 2021-06-13 PROCEDURE — 700102 HCHG RX REV CODE 250 W/ 637 OVERRIDE(OP): Performed by: NURSE PRACTITIONER

## 2021-06-13 PROCEDURE — A9270 NON-COVERED ITEM OR SERVICE: HCPCS | Performed by: STUDENT IN AN ORGANIZED HEALTH CARE EDUCATION/TRAINING PROGRAM

## 2021-06-13 RX ORDER — HEPARIN SODIUM 1000 [USP'U]/ML
INJECTION, SOLUTION INTRAVENOUS; SUBCUTANEOUS
Status: COMPLETED
Start: 2021-06-13 | End: 2021-06-13

## 2021-06-13 RX ORDER — WARFARIN SODIUM 1 MG/1
0.5 TABLET ORAL
Status: COMPLETED | OUTPATIENT
Start: 2021-06-13 | End: 2021-06-13

## 2021-06-13 RX ORDER — HEPARIN SODIUM 1000 [USP'U]/ML
1700 INJECTION, SOLUTION INTRAVENOUS; SUBCUTANEOUS
Status: DISCONTINUED | OUTPATIENT
Start: 2021-06-13 | End: 2021-06-14

## 2021-06-13 RX ADMIN — HEPARIN SODIUM 3700 UNITS: 1000 INJECTION, SOLUTION INTRAVENOUS; SUBCUTANEOUS at 14:14

## 2021-06-13 RX ADMIN — OXYCODONE 5 MG: 5 TABLET ORAL at 07:31

## 2021-06-13 RX ADMIN — LEVOTHYROXINE SODIUM 150 MCG: 0.15 TABLET ORAL at 05:08

## 2021-06-13 RX ADMIN — Medication 2000 UNITS: at 05:07

## 2021-06-13 RX ADMIN — MORPHINE SULFATE 2 MG: 4 INJECTION INTRAVENOUS at 16:08

## 2021-06-13 RX ADMIN — ERYTHROMYCIN LACTOBIONATE 500 MG: 500 INJECTION, POWDER, LYOPHILIZED, FOR SOLUTION INTRAVENOUS at 00:44

## 2021-06-13 RX ADMIN — ERYTHROMYCIN LACTOBIONATE 500 MG: 500 INJECTION, POWDER, LYOPHILIZED, FOR SOLUTION INTRAVENOUS at 05:15

## 2021-06-13 RX ADMIN — HEPARIN SODIUM 1700 UNITS: 1000 INJECTION, SOLUTION INTRAVENOUS; SUBCUTANEOUS at 12:19

## 2021-06-13 RX ADMIN — ACETAMINOPHEN 1000 MG: 500 TABLET ORAL at 00:47

## 2021-06-13 RX ADMIN — PRASUGREL 10 MG: 10 TABLET, FILM COATED ORAL at 05:08

## 2021-06-13 RX ADMIN — METOCLOPRAMIDE 5 MG: 5 INJECTION, SOLUTION INTRAMUSCULAR; INTRAVENOUS at 11:34

## 2021-06-13 RX ADMIN — OXYCODONE 5 MG: 5 TABLET ORAL at 20:04

## 2021-06-13 RX ADMIN — POTASSIUM CHLORIDE 20 MEQ: 1500 TABLET, EXTENDED RELEASE ORAL at 05:07

## 2021-06-13 RX ADMIN — OXYCODONE HYDROCHLORIDE AND ACETAMINOPHEN 500 MG: 500 TABLET ORAL at 05:07

## 2021-06-13 RX ADMIN — CYANOCOBALAMIN TAB 500 MCG 500 MCG: 500 TAB at 05:07

## 2021-06-13 RX ADMIN — OXYCODONE 5 MG: 5 TABLET ORAL at 15:05

## 2021-06-13 RX ADMIN — OMEPRAZOLE 40 MG: 20 CAPSULE, DELAYED RELEASE ORAL at 05:06

## 2021-06-13 RX ADMIN — ACETAMINOPHEN 1000 MG: 500 TABLET ORAL at 13:00

## 2021-06-13 RX ADMIN — ERYTHROMYCIN LACTOBIONATE 500 MG: 500 INJECTION, POWDER, LYOPHILIZED, FOR SOLUTION INTRAVENOUS at 12:00

## 2021-06-13 RX ADMIN — GABAPENTIN 100 MG: 100 CAPSULE ORAL at 11:44

## 2021-06-13 RX ADMIN — OXYCODONE 5 MG: 5 TABLET ORAL at 11:39

## 2021-06-13 RX ADMIN — WARFARIN SODIUM 0.5 MG: 1 TABLET ORAL at 20:04

## 2021-06-13 RX ADMIN — METOCLOPRAMIDE 5 MG: 5 INJECTION, SOLUTION INTRAMUSCULAR; INTRAVENOUS at 03:24

## 2021-06-13 ASSESSMENT — ENCOUNTER SYMPTOMS
BACK PAIN: 0
FEVER: 0
SHORTNESS OF BREATH: 0
COUGH: 0
HEADACHES: 0
DIAPHORESIS: 0
DIARRHEA: 0
ABDOMINAL PAIN: 0
MYALGIAS: 0
PALPITATIONS: 0
VOMITING: 0
CHILLS: 0
HEARTBURN: 0
FOCAL WEAKNESS: 0
DEPRESSION: 0
NECK PAIN: 0
WEAKNESS: 1
SORE THROAT: 0
SPEECH CHANGE: 0
BLURRED VISION: 0
NAUSEA: 1
WEIGHT LOSS: 1
FLANK PAIN: 0
DIZZINESS: 0
SENSORY CHANGE: 0
WHEEZING: 0
NERVOUS/ANXIOUS: 0

## 2021-06-13 ASSESSMENT — PAIN SCALES - WONG BAKER
WONGBAKER_NUMERICALRESPONSE: HURTS JUST A LITTLE BIT
WONGBAKER_NUMERICALRESPONSE: HURTS JUST A LITTLE BIT

## 2021-06-13 ASSESSMENT — PATIENT HEALTH QUESTIONNAIRE - PHQ9
SUM OF ALL RESPONSES TO PHQ9 QUESTIONS 1 AND 2: 0
1. LITTLE INTEREST OR PLEASURE IN DOING THINGS: NOT AT ALL
2. FEELING DOWN, DEPRESSED, IRRITABLE, OR HOPELESS: NOT AT ALL

## 2021-06-13 ASSESSMENT — PAIN DESCRIPTION - PAIN TYPE
TYPE: ACUTE PAIN;CHRONIC PAIN
TYPE: ACUTE PAIN;CHRONIC PAIN

## 2021-06-13 NOTE — CARE PLAN
The patient is Watcher - Medium risk of patient condition declining or worsening    Shift Goals  Clinical Goals: Remain hemodynamically stable, prevent bleeding, balance F & Es, safety  Patient Goals: Rest, decreased nausea  Family Goals: NA    Progress made toward(s) clinical / shift goals:    Problem: Hemodynamics  Goal: Patient's hemodynamics, fluid balance and neurologic status will be stable or improve  Outcome: Progressing     Problem: Fluid Volume  Goal: Fluid volume balance will be maintained  Outcome: Progressing     Problem: Fall Risk  Goal: Patient will remain free from falls  Outcome: Progressing       Patient is not progressing towards the following goals: Nutritional intake below required need. Goal to consume / increase dietary intake. Progressing slower than expected.

## 2021-06-13 NOTE — PROGRESS NOTES
Hospital Medicine Daily Progress Note    Date of Service  6/13/2021    Chief Complaint  66 y.o. male presented 6/4/2021 with nausea and vomiting    Hospital Course  Admitted with nausea and vomiting, weight loss.  He had a CT abdomen and pelvis done in an outside facility which showed possible SMA stenosis.  CT scan of the abdomen pelvis here was noted to be negative.  Gastroenterology was consulted case, EGD was done which showed gastric polyps, nonerosive gastritis.  Patient was unable to do a gastric emptying scan.  Patient also with known history of end-stage renal disease on peritoneal dialysis.  It was felt that this may be contributing to his nausea and vomiting.  A right internal jugular hemodialysis catheter was placed on 6/11/2021, and hemodialysis has been initiated.  Also apparently developed chest pain, his troponin was markedly elevated at 1500, cardiology has been consulted on the case.  Known history of CAD with stents, and chronic systolic CHF.    Interval Problem Update  Nausea and vomiting - less nausea, no vomting, HIDA scan showed gallbladder dyskinesia, no RUQ pain or tenderness, LFTs elevated  Chest pain - none currently  HTN - sbp   CHF - EF 30%    Updates given and plan of care discussed with patient's spouse.    Consultants/Specialty  Nephrology  Gastroenterology  Cardiology    Code Status  Full Code    Disposition  TBD    Review of Systems  Review of Systems   Constitutional: Positive for weight loss. Negative for chills, diaphoresis, fever and malaise/fatigue.   HENT: Negative for hearing loss, nosebleeds and sore throat.    Eyes: Negative for blurred vision.   Respiratory: Negative for cough, shortness of breath and wheezing.    Cardiovascular: Negative for chest pain, palpitations and leg swelling.   Gastrointestinal: Positive for nausea. Negative for abdominal pain, diarrhea, heartburn and vomiting.   Genitourinary: Negative for dysuria, flank pain and hematuria.    Musculoskeletal: Negative for back pain, joint pain, myalgias and neck pain.   Skin: Negative for rash.   Neurological: Positive for weakness. Negative for dizziness, sensory change, speech change, focal weakness and headaches.   Psychiatric/Behavioral: Negative for depression. The patient is not nervous/anxious.         Physical Exam  Temp:  [36.1 °C (97 °F)-36.3 °C (97.3 °F)] 36.2 °C (97.1 °F)  Pulse:  [73-84] 73  Resp:  [16-18] 16  BP: ()/(40-52) 81/40  SpO2:  [98 %-100 %] 100 %    Physical Exam  Vitals and nursing note reviewed.   Constitutional:       Appearance: He is ill-appearing.      Comments: Cachetic   HENT:      Head: Normocephalic and atraumatic.      Mouth/Throat:      Mouth: Mucous membranes are moist.   Eyes:      Extraocular Movements: Extraocular movements intact.      Conjunctiva/sclera: Conjunctivae normal.      Pupils: Pupils are equal, round, and reactive to light.   Cardiovascular:      Rate and Rhythm: Normal rate and regular rhythm.      Heart sounds: Murmur heard.     Pulmonary:      Effort: Pulmonary effort is normal.      Breath sounds: Rales present.   Abdominal:      General: Bowel sounds are normal. There is no distension.      Palpations: Abdomen is soft.      Tenderness: There is no abdominal tenderness. There is no guarding or rebound.      Comments: PD catheter    Musculoskeletal:      Right lower leg: No edema.      Left lower leg: No edema.      Comments: Left BKA    Skin:     General: Skin is warm and dry.   Neurological:      General: No focal deficit present.      Mental Status: He is alert and oriented to person, place, and time.      Cranial Nerves: No cranial nerve deficit.   Psychiatric:         Mood and Affect: Mood is anxious.         Fluids    Intake/Output Summary (Last 24 hours) at 6/13/2021 1347  Last data filed at 6/12/2021 2030  Gross per 24 hour   Intake 30 ml   Output --   Net 30 ml       Laboratory  Recent Labs     06/11/21  0634 06/12/21  0059  06/13/21  0925   WBC 11.1* 10.7 10.1   RBC 3.19* 2.92* 3.06*   HEMOGLOBIN 10.6* 9.7* 10.2*   HEMATOCRIT 32.9* 30.1* 32.8*   .1* 103.1* 107.2*   MCH 33.2* 33.2* 33.3*   MCHC 32.2* 32.2* 31.1*   RDW 61.8* 62.8* 65.5*   PLATELETCT 152* 128* 118*   MPV 11.1 10.9 11.5     Recent Labs     06/11/21  1341 06/12/21  0059 06/13/21  0925   SODIUM 132* 135 134*   POTASSIUM 4.1 3.9 4.9   CHLORIDE 94* 101 97   CO2 24 19* 20   GLUCOSE 76 78 44*   BUN 64* 62* 58*   CREATININE 12.60* 10.55* 10.30*   CALCIUM 8.3* 7.0* 8.4*     Recent Labs     06/11/21  0634 06/12/21  0059 06/13/21  0925   INR 2.60* 5.31* 2.84*         Recent Labs     06/12/21  0750   TRIGLYCERIDE 83   HDL 36*   LDL 30       Imaging  NM-BILIARY (HIDA) SCAN WITH CCK   Final Result         1. No evidence of acute cholecystitis.      2. Low gallbladder ejection fraction with no response to CCK. This could relate to biliary dyskinesia.         DX-CHEST-PORTABLE (1 VIEW)   Final Result      No acute cardiopulmonary abnormality.      IR-ROSALES,GROSHONG PLACEMENT >5   Final Result      1. ULTRASOUND AND FLUOROSCOPIC GUIDED PLACEMENT OF A Right INTERNAL JUGULAR 14.5 [place] HemoSplit TUNNELED DIALYSIS CATHETER.      2. THE HEMODIALYSIS CATHETER MAY BE USED IMMEDIATELY AS CLINICALLY INDICATED. FLUSHES PER PROTOCOL.      3. FOCAL TORTUOSITY AND NARROWING AT THE DISTAL RIGHT INTERNAL JUGULAR VEIN-RIGHT BRACHIOCEPHALIC JUNCTION.      DX-UPPER GI-SERIES WITH KUB   Final Result      1.  Unremarkable upper GI exam.         EC-ECHOCARDIOGRAM COMPLETE W/O CONT   Final Result      CT-CHEST (THORAX) W/O   Final Result      1.  Abnormal lung parenchyma      2.  Specifically, there are multifocal bilateral airspace process, 14 mm right middle lobe groundglass density, posterior aspect right lower lobe 12 mm area of spiculation and probably underlying emphysema.      3.  Pulmonary findings are nonspecific and could be inflammation, infection, or postinflammatory scarring. Neoplasm  is less likely.      4.  Dense aortic and branch vessel atherosclerotic plaque      5.  Pulmonary artery hypertension      6.  Very small bilateral pleural effusion      Fleischner Society pulmonary nodule recommendations:      Ground Glass and Part Solid: No routine follow-up      Comments: In certain suspicious nodules less than 6 mm, consider follow-up at 2 and 4 years. If solid component(s) or growth develops, consider resection.      Note: These recommendations do not apply to lung cancer screening, patients with immunosuppression, or patients with known primary cancer.      Fleischner Society 2017 Guidelines for Management of Incidentally Detected Pulmonary Nodules in Adults      OUTSIDE IMAGES-US ABDOMEN   Final Result      OUTSIDE IMAGES-DX CHEST   Final Result      CT-ABDOMEN-PELVIS WITH   Final Result   Addendum 1 of 1   On further review, the SMA and celiac axis are noted to be patent.    Contrast is difficult to confirm within the diminutive and markedly    atherosclerotic renal arteries.      Final      1.  No acute abdominal or pelvic findings.   2.  Bilaterally small atrophic appearing kidneys consistent with renal failure, with a dialysis catheter noted in the pelvic cavity.   3.  Colonic diverticulosis without diverticulitis.           Assessment/Plan  * Intractable nausea and vomiting- (present on admission)  Assessment & Plan  EGD - Gastric polyps, Non-erosive gastritis. 6/8/2021  IV Erythromycin  Decreased IV Reglan to 5 mg TID      Chest pain  Assessment & Plan  Multimodal pain management - scheduled Tylenol, Lidocaine patches, Neurontin, Oyxcodone and IV Morphine prn    Elevated troponin- (present on admission)  Assessment & Plan  Effient  Cardiology following    Frailty syndrome in geriatric patient- (present on admission)  Assessment & Plan  Consulted Palliative care      ESRD (end stage renal disease) on dialysis (HCC)- (present on admission)  Assessment & Plan  Previously on PD  Right  Internal Jugular Tunneled Hemodialysis Catheter Placement. 6/11/2021  HD per Nephrology      Elevated LFTs- (present on admission)  Assessment & Plan  Follow cmp  US - Mildly hydropic gallbladder. No sonographic signs of acute cholecystitis  HIDA scan - No evidence of acute cholecystitis. Low gallbladder ejection fraction with no response to CCK. This could relate to biliary dyskinesia.  Hepatic congestion?    Dyskinesia of gallbladder- (present on admission)  Assessment & Plan  May need Surgical consult if with persistent Nausea and Vomiting    Thrombocytopenia (HCC)- (present on admission)  Assessment & Plan  Follow cbc    Anemia- (present on admission)  Assessment & Plan  Follow cbc    Supratherapeutic INR  Assessment & Plan  Coumadin per pharmacy  Monitor for signs of bleeding    Hyponatremia- (present on admission)  Assessment & Plan  Follow bmp    Hypokalemia- (present on admission)  Assessment & Plan  Stop Kdur, follow bmp    Prolonged QT interval- (present on admission)  Assessment & Plan  EKG 6/12/2021 QTc - 598  Decreased IV Reglan dose  Telemetry monitoring  Check EKG tomorrow      Systolic CHF, chronic (HCC)- (present on admission)  Assessment & Plan  Holding Coreg, Isordil    PVD (peripheral vascular disease) (HCC)- (present on admission)  Assessment & Plan  s/p left BKA  Coumadin    Non-rheumatic mitral regurgitation- (present on admission)  Assessment & Plan  Severe     CAD (coronary artery disease)- (present on admission)  Assessment & Plan  History of stents  Effient, Lipitor, Ranexa  Holding Coreg and Isordil due to low blood pressure  Holding ASA      Essential hypertension- (present on admission)  Assessment & Plan  Hold Coreg, Isordil    Anemia in chronic kidney disease (CKD)- (present on admission)  Assessment & Plan  Follow cbc    Anxiety- (present on admission)  Assessment & Plan  Xanax prn    Severe protein-calorie malnutrition (HCC)- (present on admission)  Assessment & Plan  Nutrition  consult    Superior mesenteric artery stenosis (HCC)- (present on admission)  Assessment & Plan  CT negative    C. difficile colitis- (present on admission)  Assessment & Plan  History of    Status post below knee amputation of left lower extremity- (present on admission)  Assessment & Plan  PT and OT    Mixed hyperlipidemia- (present on admission)  Assessment & Plan  Lipitor    Hypothyroid- (present on admission)  Assessment & Plan  Synthroid  TSH pending       VTE prophylaxis: Coumadin

## 2021-06-13 NOTE — PROGRESS NOTES
Hemodialysis ordered by Dr. Rivas. Treatment started 1207 and ended at 1437. Pt stable, vss, no c/o post tx. See flow sheets for details. Net UF 1.0 L. Reported to MARLENY Tellez RN. Nursing time charged 2 hrs for waiting for transport to bring pt to dialysis room.

## 2021-06-13 NOTE — ASSESSMENT & PLAN NOTE
Follow cmp  US - Mildly hydropic gallbladder. No sonographic signs of acute cholecystitis  HIDA scan - No evidence of acute cholecystitis. Low gallbladder ejection fraction with no response to CCK. This could relate to biliary dyskinesia.  Hepatic congestion, on HD  monitor

## 2021-06-13 NOTE — PROGRESS NOTES
[facility] -  PROGRESS NOTE               Author: Cj Rivas M.D.  Collaborating Physician: Dr. Rivas  Date & Time: 6/13/2021  11:51 AM     HPI:  67 yo M PMH ESRD CCPD, HTN, anemia of CKD, CKD-MBD, HLD, and CAD who presents to ED with CC as above.  He has chronic intermittent N/V that he's been battling for a long time.  It leads to wide fluctuations in his po intake.  He also has severe CAD and non-cardiac atherosclerosis.  Recently he feel she has lost 45 lbs in the past month due to the N/V.  He's had a CTA in past that has revealed SMA steosis, but report was not available here at this institution.  Repeat imaging ordered by admit MD and he was admitted for further evaluation.  Has no abd pain and otherwise denies any associated F/C/CP/SOB.  No melena, hematochezia, hematemesis.  No HA, visual changes.    DAILY NEPHROLOGY SUMMARY:  6/05 - Consult done  6/06 - NAEO, +N/V; Unable to keep majority of food down  6/07 - sitting up in bed, still having poor intake vomited this am, PD UF: 461ml, had UGI: WNL, had large BM this am   6/08: sitting up in bed, feels miserable, weak and nauseated, for EGD today, discussed case with Dr. Rivas, dont believe PD is contributing factor as pt BUN is only mildly elevated and pt has been on PD for > 1 year, if GI workup neg could consider transitioning to HD to see if helps elevate symptoms, tolerated PD with UF: 450ml  6/09: Ambulating in room. C/o continued N/V and dehydration. S/p EGD w/bx's of stomach/duodenum.  318 mL net UF. GES today.  6/10: Nauseous with small amount of emesis this am. Does not feel he can tolerate food with GES this am. Primary RN at . 473 Net UF CCPD.   6/11: Unable to complete GES due to nausea. Continues to have nausea.   6/12; again unable to complete GES due to nausea; had a permacath placed but hemodialysis plan was held as became hypotensive and rapid response was called, received IV fluid boluses for  "resuscitation, cardiology was consulted, seen on hemodialysis this a.m., blood pressure better, is very weak  6/13: Had first hemodialysis treatment yesterday, tolerated well, nausea slightly better still feels very weak and has poor appetite      REVIEW OF SYSTEMS:    10 point ROS reviewed and is as per HPI/daily summary or otherwise negative    PMH/PSH/SH/FH: Reviewed and unchanged since admission note  CURRENT MEDICATIONS: Reviewed from admission to present day    VS:  BP (!) 81/40 Comment: RN notified   Pulse 73   Temp 36.2 °C (97.1 °F) (Temporal)   Resp 16   Ht 1.778 m (5' 10\")   Wt 52.3 kg (115 lb 4.8 oz)   SpO2 100%   BMI 16.54 kg/m²   Physical Exam  Nursing note reviewed.   Constitutional:       General: He is not in acute distress.     Appearance: Normal appearance. He is ill-appearing. He is not diaphoretic.      Comments: Cachectic    HENT:      Head: Normocephalic and atraumatic.   Neck:      Trachea: No tracheal tenderness.   Cardiovascular:      Rate and Rhythm: Normal rate.      Heart sounds: Murmur heard.   No friction rub. No gallop.       Comments: No edema  Pulmonary:      Effort: Pulmonary effort is normal. No respiratory distress.   Abdominal:      General: There is no distension.      Comments: PD Cath in place   Musculoskeletal:         General: No deformity.      Comments: L BKA w/prosthesis   Skin:     General: Skin is warm and dry.      Coloration: Skin is not jaundiced.      Findings: No rash.   Neurological:      Mental Status: He is alert and oriented to person, place, and time.      Motor: No atrophy.   Psychiatric:         Behavior: Behavior normal.     Fluids:  In: 530 [P.O.:30; Dialysis:500]  Out: 600     LABS:  Recent Labs     06/11/21  1341 06/12/21  0059 06/13/21  0925   SODIUM 132* 135 134*   POTASSIUM 4.1 3.9 4.9   CHLORIDE 94* 101 97   CO2 24 19* 20   GLUCOSE 76 78 44*   BUN 64* 62* 58*   CREATININE 12.60* 10.55* 10.30*   CALCIUM 8.3* 7.0* 8.4*       IMPRESSION:  # " ESRD   - Etiology likely 2/2 HTN   -was on CCPD now switched to HD this admission  #Cachexia, unexplained 40 pound weight loss, intractable nausea vomiting -  - Followed by GI and has had extensive work up, upper GI: WNL , EGD 6/8    - Bx stomach and duodenum done - Path pending    -EGD essentially normal; no evidence of stricture, no erosive gastritis   -Unable to complete gastric emptying study x3   -On IV reglan and IV erythromycin x3 doses   - Considering outpatient Smart pill study  # HTN now hypotensive   -hold blood pressure medicines   #cardiomyopathy, chronic systolic heart failure, severe CAD, severe MR   -EF of 30% and severe MR.     - Needs cath pe cardiology once INR is under 1.5.  # Anemia of CKD   - Goal Hgb 10-11   - Stable  # CKD-MBD   - managed at outpt clinic    - hold phos binder for now with N/V  # Hypokalemia, improved    -Was on PD so hypokalemia expected    -K better , since not on PD anymore will DC KCl   # Hyponatremia, mild   -Better, monitoring  #Coagulopathy on warfarin     PLAN:  -Switched from CCPD to hemodialysis to address uremia and see if this improves his GI symptoms -severe nausea and vomiting  - S/P tunneled cath placement 6/11/21; HD #1 yesterday, HD #2 today  -No more CCPD from here on  -Discontinue p.o. KCl since not on PD anymore and potassium much better  -Pharmacy managing INR ; hold Coumadin until INR below 1.5 so that cardiology can proceed with heart cath as recommended by Dr. Altman from cardiology   - no dietary protein restrictions; Regular diet ok  - Dose all meds per ESRD  - No fluid restrictions at this time  - Nepro with meals  - hold phos binders since phosphorus very low  - Okay to bring Liquacel Protein supplement from home     Spoke with Dr. Skinny Cantrell on Friday and his outpatient unit knows that he will be transitioned to outpatient hemodialysis on discharge     thank you and will follow with you      - continue PO KCL daily

## 2021-06-13 NOTE — PROGRESS NOTES
Cardiology Consult Note:    Thomas Garza M.D.  Date & Time note created:    2021   1:29 PM     Referring MD:  Dr. Del Castillo    Patient ID:   Name:             Francis Purvis   YOB: 1955  Age:                 66 y.o.  male   MRN:               [id number]                                                             Reason for Consult:      Pos trop    History of Present Illness:    66-year-old male with a long complex medical history which started back in the 1970s when he had testicular cancer and underwent whole body radiation to the tune of 4000 mGy.  Since then he has suffered with issues around valvular disease and coronary disease likely secondary to radiation-induced atherosclerosis.  He went into renal failure years ago when he had a graft placed in his aorta from the ascending aorta all the way down through the descending aorta.  He was crossclamped and he said at that point his kidneys .  He has been on dialysis.  He is followed by cardiology in [facility] and reports having sick stents placed.  He was admitted here for chronic nausea and vomiting.  This led to weight loss and issues with some fatigue.  We were called due to an elevated BNP as well as a elevated troponin.  His most recent echocardiogram here showed an EF of 30% but we do not have 1 from the outside facility.  He denies chest pain or pressure.    Review of Systems:      Constitutional: Denies fevers, Denies weight changes  Eyes: Denies changes in vision, no eye pain  Ears/Nose/Throat/Mouth: Denies nasal congestion or sore throat   Cardiovascular: no chest pain, no palpitations   Respiratory: no shortness of breath , Denies cough  Gastrointestinal/Hepatic: Denies abdominal pain, nausea, vomiting, diarrhea, constipation or GI bleeding   Genitourinary: Denies dysuria or frequency  Musculoskeletal/Rheum: Denies  joint pain and swelling, noedema  Skin: Denies rash  Neurological: Denies headache, confusion,  memory loss or focal weakness/parasthesias  Psychiatric: denies mood disorder   Endocrine: Claribel thyroid problems  Heme/Oncology/Lymph Nodes: Denies enlarged lymph nodes, denies brusing or known bleeding disorder  All other systems were reviewed and are negative (AMA/CMS criteria)                Past Medical History:   Past Medical History:   Diagnosis Date   • Arterial thrombosis (HCC)     recurrent   • Arterial vascular disease     result of chemotherapy   • Colonic polyp    • DDD (degenerative disc disease), lumbosacral 2018   • Degenerative joint disease (DJD) of hip 2018   • Familial hyperlipidemia    • Hypogonadism male    • Hypothyroid    • S/P below knee amputation (HCC)     left   • Testicular cancer (HCC)      Active Hospital Problems    Diagnosis    • Dyskinesia of gallbladder [K82.8]    • Elevated troponin [R77.8]    • Chest pain [R07.9]    • Hypokalemia [E87.6]    • Hyponatremia [E87.1]    • Supratherapeutic INR [R79.1]    • Anemia [D64.9]    • Thrombocytopenia (HCC) [D69.6]    • Prolonged QT interval [R94.31]    • Systolic CHF, chronic (HCC) [I50.22]    • Superior mesenteric artery stenosis (HCC) [K55.1]    • Frailty syndrome in geriatric patient [R54]    • Severe protein-calorie malnutrition (HCC) [E43]    • PVD (peripheral vascular disease) (HCC) [I73.9]    • Anxiety [F41.9]    • Non-rheumatic mitral regurgitation [I34.0]    • Hypothyroid [E03.9]    • CAD (coronary artery disease) [I25.10]    • Mixed hyperlipidemia [E78.2]    • Essential hypertension [I10]    • Anemia in chronic kidney disease (CKD) [N18.9, D63.1]    • ESRD (end stage renal disease) on dialysis (HCC) [N18.6, Z99.2]    • C. difficile colitis [A04.72]    • Intractable nausea and vomiting [R11.2]    • Status post below knee amputation of left lower extremity [Z89.512]        Past Surgical History:  Past Surgical History:   Procedure Laterality Date   • PB UPPER GI ENDOSCOPY,DIAGNOSIS N/A 6/8/2021    Procedure: GASTROSCOPY-WITH POSS  BIOPSY.;  Surgeon: Antonio Calixto M.D.;  Location: SURGERY SAME DAY [facility];  Service: Gastroenterology   • PB UPPER GI ENDOSCOPY,BIOPSY N/A 6/8/2021    Procedure: GASTROSCOPY, WITH BIOPSY;  Surgeon: Antonio Calixto M.D.;  Location: SURGERY SAME DAY [facility] [place];  Service: Gastroenterology   • PB UP GI ENDOSCOPY,REMV TUMOR,SNARE N/A 6/8/2021    Procedure: GASTROSCOPY, WITH POLYPECTOMY;  Surgeon: Antonio Calixto M.D.;  Location: SURGERY SAME DAY [facility];  Service: Gastroenterology   • AMPUTATION, BELOW THE KNEE     • ORCHIECTOMY BILATERAL     • TONSILLECTOMY         Hospital Medications:  Current Facility-Administered Medications   Medication Dose   • heparin injection 1,700 Units  1,700 Units   • atorvastatin (LIPITOR) tablet 80 mg  80 mg   • heparin injection 3,700 Units  3,700 Units   • oxyCODONE immediate-release (ROXICODONE) tablet 5 mg  5 mg   • morphine (pf) 4 mg/mL injection 1-2 mg  1-2 mg   • acetaminophen (TYLENOL) tablet 1,000 mg  1,000 mg   • gabapentin (NEURONTIN) capsule 100 mg  100 mg   • lidocaine (LIDODERM) 5 % 2 Patch  2 Patch   • metoclopramide (REGLAN) injection 5 mg  5 mg   • dexamethasone (DECADRON) injection 4 mg  4 mg   • LORazepam (ATIVAN) injection 0.5 mg  0.5 mg   • MD Alert...Warfarin per Pharmacy     • erythromycin lactobionate (ERYTHROCIN) 500 mg in  mL IVPB  500 mg   • bisacodyl (DULCOLAX) suppository 10 mg  10 mg   • senna-docusate (PERICOLACE or SENOKOT S) 8.6-50 MG per tablet 1 tablet  1 tablet   • polyethylene glycol/lytes (MIRALAX) PACKET 1 Packet  1 Packet   • lactated ringers infusion (BOLUS)  500 mL   • enalaprilat (VASOTEC) injection 1.25 mg  1.25 mg   • labetalol (NORMODYNE/TRANDATE) injection 10 mg  10 mg   • guaiFENesin dextromethorphan (ROBITUSSIN DM) 100-10 MG/5ML syrup 10 mL  10 mL   • albuterol inhaler 1-2 Puff  1-2 Puff   • ascorbic acid (Vitamin C) tablet 500 mg  500 mg   • [Held by provider] carvedilol (COREG) tablet 6.25 mg  6.25 mg   • cyanocobalamin (VITAMIN  B-12) tablet 500 mcg  500 mcg   • fluticasone (FLONASE) nasal spray 100 mcg  2 Spray   • [Held by provider] isosorbide dinitrate (ISORDIL) tablet 30 mg  30 mg   • levothyroxine (SYNTHROID) tablet 150 mcg  150 mcg   • prasugrel (EFFIENT) tablet 10 mg  10 mg   • ranolazine (RANEXA) 500 MG SR tablet TB12 500 mg  500 mg   • vitamin D (cholecalciferol) tablet 2,000 Units  2,000 Units   • ALPRAZolam (XANAX) tablet 0.25 mg  0.25 mg         Current Outpatient Medications:  Facility-Administered Medications Prior to Admission   Medication Dose Route Frequency Provider Last Rate Last Admin   • testosterone cypionate (DEPO-TESTOSTERONE) injection 200 mg  200 mg Intramuscular Q30 DAYS Kali Liang M.D.   200 mg at 04/01/21 1412     Medications Prior to Admission   Medication Sig Dispense Refill Last Dose   • carvedilol (COREG) 6.25 MG Tab TAKE 1 TABLET BY MOUTH TWICE A DAY   6/3/2021 at Unknown time   • warfarin (COUMADIN) 2.5 MG Tab Take 1 tablet by mouth every day. 90 tablet 3 6/3/2021 at 2000   • ondansetron (ZOFRAN ODT) 4 MG TABLET DISPERSIBLE One tablet under the tongue every six hours 30 tablet 2 6/4/2021 at Unknown time   • aspirin EC (ECOTRIN) 81 MG Tablet Delayed Response Take 81 mg by mouth every day.   6/3/2021 at 2000   • ranolazine (RANEXA) 500 MG TABLET SR 12 HR Take 500 mg by mouth 2 times a day.   6/4/2021 at 0800   • Dexlansoprazole 60 MG CAPSULE DELAYED RELEASE delayed-release capsule Take 60 mg by mouth 2 times a day.   5/20/2021   • nitroglycerin (NITROSTAT) 0.4 MG SL Tab Place 0.4 mg under the tongue.   5/3/2021   • isosorbide dinitrate (ISORDIL) 30 MG Tab Take 1 tablet by mouth 2 times a day. 180 tablet 3 6/4/2021 at Unknown time   • ALPRAZolam (XANAX) 0.5 MG Tab take 1 tablet by mouth at bedtime if needed for sleep or anxiety 30 tablet 0 6/3/2021 at 2000   • Ferrous Sulfate 50 MG Tab CR Take 1 tablet by mouth every day. (Patient not taking: Reported on 6/3/2021)      • prasugrel (EFFIENT) 10 MG Tab  Take 10 mg by mouth every day.   6/3/2021 at Unknown time   • oxyCODONE immediate-release (ROXICODONE) 5 MG Tab Take 5 mg by mouth.   6/3/2021 at 2000   • albuterol 108 (90 Base) MCG/ACT Aero Soln inhalation aerosol inhale 1 to 2 puffs by mouth every 6 hours if needed for shortness of breath 8.5 g 3 5/4/2021   • levothyroxine (SYNTHROID) 150 MCG Tab TAKE 1 TABLET BY MOUTH EVERY MORNING ON AN EMPTY STOMACH. 100 Tab 3 6/4/2021 at 0600   • atorvastatin (LIPITOR) 40 MG Tab Take 1 Tab by mouth every day. 100 Tab 3 6/3/2021 at 2000   • Calcium Acetate, Phos Binder, 667 MG Cap take 2 capsules by mouth four times a day WITH MEALS AND SNACKS  1 5/13/2021   • vitamin D (CHOLECALCIFEROL) 1000 UNIT Tab Take 2,000 Units by mouth every day.   6/3/2021 at Unknown time   • VITAMIN E PO Take  by mouth. Dose unknown   6/3/2021 at Unknown time   • ascorbic acid (ASCORBIC ACID) 500 MG Tab Take 500 mg by mouth every day.   6/3/2021 at 2000   • cyanocobalamin (VITAMIN B-12) 500 MCG Tab Take 500 mcg by mouth every day.   6/4/2021 at Unknown time   • loratadine (CLARITIN) 10 MG Tab Take 1 Tab by mouth every day. 30 Tab 3 5/4/2021   • fluticasone (FLONASE) 50 MCG/ACT nasal spray Spray 2 Sprays in nose every day. Each Nostril 16 g 11 6/3/2021 at Unknown time       Medication Allergy:  Allergies   Allergen Reactions   • Amoxicillin-Pot Clavulanate Unspecified     body cramps  Body cramps   • Augmentin Unspecified     Body cramps   • Codeine Unspecified     HA  Tolerated Morphine 09/2020   • Promethazine Unspecified     muscle spasms       Family History:  Family History   Problem Relation Age of Onset   • Heart Failure Father    • Heart Failure Mother    • Osteoporosis Mother    • Osteoporosis Sister    • Osteoporosis Sister    • Osteoporosis Sister    • Heart Attack Other    • Cancer Other        Social History:  Social History     Socioeconomic History   • Marital status:      Spouse name: Not on file   • Number of children: 2   •  "Years of education: Not on file   • Highest education level: Not on file   Occupational History   • Occupation: retired     Employer: retired   Tobacco Use   • Smoking status: Former Smoker     Packs/day: 1.00     Types: Cigarettes     Quit date: 1994     Years since quittin.0   • Smokeless tobacco: Never Used   Vaping Use   • Vaping Use: Never used   Substance and Sexual Activity   • Alcohol use: Not Currently     Alcohol/week: 1.5 oz     Types: 3 Cans of beer per week     Comment: occassional use   • Drug use: No   • Sexual activity: Yes   Other Topics Concern   • Not on file   Social History Narrative   • Not on file     Social Determinants of Health     Financial Resource Strain:    • Difficulty of Paying Living Expenses:    Food Insecurity:    • Worried About Running Out of Food in the Last Year:    • Ran Out of Food in the Last Year:    Transportation Needs:    • Lack of Transportation (Medical):    • Lack of Transportation (Non-Medical):    Physical Activity:    • Days of Exercise per Week:    • Minutes of Exercise per Session:    Stress:    • Feeling of Stress :    Social Connections:    • Frequency of Communication with Friends and Family:    • Frequency of Social Gatherings with Friends and Family:    • Attends Jainism Services:    • Active Member of Clubs or Organizations:    • Attends Club or Organization Meetings:    • Marital Status:    Intimate Partner Violence:    • Fear of Current or Ex-Partner:    • Emotionally Abused:    • Physically Abused:    • Sexually Abused:          Physical Exam:  Vitals/ General Appearance:   Weight/BMI: Body mass index is 16.54 kg/m².  BP (!) 81/40   Pulse 73   Temp 36.2 °C (97.1 °F) (Temporal)   Resp 16   Ht 1.778 m (5' 10\")   Wt 52.3 kg (115 lb 4.8 oz)   SpO2 100%   Vitals:    21 2109 21 2252 21 0325 21 0725   BP: (!) 96/50 (!) 99/52 (!) 97/48 (!) 81/40   Pulse: 80 79 74 73   Resp: 17 17 16 16   Temp: 36.1 °C (97 °F) 36.1 °C (97 " °F) 36.3 °C (97.3 °F) 36.2 °C (97.1 °F)   TempSrc: Temporal Temporal Temporal Temporal   SpO2: 98% 98% 99% 100%   Weight: 52.3 kg (115 lb 4.8 oz)      Height:         Oxygen Therapy:  Pulse Oximetry: 100 %, O2 (LPM): 1, O2 Delivery Device: Silicone Nasal Cannula    Constitutional:   Well developed, Well nourished, No acute distress  HENMT:  Normocephalic, Atraumatic, Oropharynx moist mucous membranes, No oral exudates, Nose normal.  No thyromegaly.  Eyes:  EOMI, Conjunctiva normal, No discharge.  Neck:  Normal range of motion, No cervical tenderness,  no JVD.  Cardiovascular:  Normal heart rate, Normal rhythm, No murmurs, No rubs, No gallops.   Extremitites with intact distal pulses, no cyanosis, or edema.  Lungs:  Normal breath sounds, breath sounds clear to auscultation bilaterally,  no crackles, no wheezing.   Abdomen: Bowel sounds normal, Soft, No tenderness, No guarding, No rebound, No masses, No hepatosplenomegaly.  Skin: Warm, Dry, No erythema, No rash, no induration.  Neurologic: Alert & oriented x 3, No focal deficits noted, cranial nerves II through X are grossly intact.  Psychiatric: Affect normal, Judgment normal, Mood normal.      MDM (Data Review):     Records reviewed and summarized in current documentation    Lab Data Review:  Recent Results (from the past 24 hour(s))   CBC WITH DIFFERENTIAL    Collection Time: 06/13/21  9:25 AM   Result Value Ref Range    WBC 10.1 4.8 - 10.8 K/uL    RBC 3.06 (L) 4.70 - 6.10 M/uL    Hemoglobin 10.2 (L) 14.0 - 18.0 g/dL    Hematocrit 32.8 (L) 42.0 - 52.0 %    .2 (H) 81.4 - 97.8 fL    MCH 33.3 (H) 27.0 - 33.0 pg    MCHC 31.1 (L) 33.7 - 35.3 g/dL    RDW 65.5 (H) 35.9 - 50.0 fL    Platelet Count 118 (L) 164 - 446 K/uL    MPV 11.5 9.0 - 12.9 fL    Neutrophils-Polys 87.60 (H) 44.00 - 72.00 %    Lymphocytes 8.80 (L) 22.00 - 41.00 %    Monocytes 1.80 0.00 - 13.40 %    Eosinophils 0.90 0.00 - 6.90 %    Basophils 0.90 0.00 - 1.80 %    Nucleated RBC 0.00 /100 WBC     Neutrophils (Absolute) 8.85 (H) 1.82 - 7.42 K/uL    Lymphs (Absolute) 0.89 (L) 1.00 - 4.80 K/uL    Monos (Absolute) 0.18 0.00 - 0.85 K/uL    Eos (Absolute) 0.09 0.00 - 0.51 K/uL    Baso (Absolute) 0.09 0.00 - 0.12 K/uL    NRBC (Absolute) 0.00 K/uL    Anisocytosis 1+     Macrocytosis 1+    Comp Metabolic Panel    Collection Time: 06/13/21  9:25 AM   Result Value Ref Range    Sodium 134 (L) 135 - 145 mmol/L    Potassium 4.9 3.6 - 5.5 mmol/L    Chloride 97 96 - 112 mmol/L    Co2 20 20 - 33 mmol/L    Anion Gap 17.0 (H) 7.0 - 16.0    Glucose 44 (LL) 65 - 99 mg/dL    Bun 58 (H) 8 - 22 mg/dL    Creatinine 10.30 (HH) 0.50 - 1.40 mg/dL    Calcium 8.4 (L) 8.5 - 10.5 mg/dL    AST(SGOT) 652 (H) 12 - 45 U/L    ALT(SGPT) 934 (H) 2 - 50 U/L    Alkaline Phosphatase 476 (H) 30 - 99 U/L    Total Bilirubin 0.9 0.1 - 1.5 mg/dL    Albumin 2.6 (L) 3.2 - 4.9 g/dL    Total Protein 4.5 (L) 6.0 - 8.2 g/dL    Globulin 1.9 1.9 - 3.5 g/dL    A-G Ratio 1.4 g/dL   Prothrombin Time    Collection Time: 06/13/21  9:25 AM   Result Value Ref Range    PT 29.0 (H) 12.0 - 14.6 sec    INR 2.84 (H) 0.87 - 1.13   DIFFERENTIAL MANUAL    Collection Time: 06/13/21  9:25 AM   Result Value Ref Range    Manual Diff Status PERFORMED    PERIPHERAL SMEAR REVIEW    Collection Time: 06/13/21  9:25 AM   Result Value Ref Range    Peripheral Smear Review see below    PLATELET ESTIMATE    Collection Time: 06/13/21  9:25 AM   Result Value Ref Range    Plt Estimation Decreased    MORPHOLOGY    Collection Time: 06/13/21  9:25 AM   Result Value Ref Range    RBC Morphology Present    ESTIMATED GFR    Collection Time: 06/13/21  9:25 AM   Result Value Ref Range    GFR If  6 (A) >60 mL/min/1.73 m 2    GFR If Non African American 5 (A) >60 mL/min/1.73 m 2       Imaging/Procedures Review:    Chest Xray:  Reviewed    EKG:   Dated 6/12/2021 personally viewed under myself showing normal sinus rhythm with a nonspecific ST segment changes.    ECHO:  Dated 6/4/2021  personally viewed interpret myself showing an LV systolic function of 30% with severe mitral regurgitation moderate AI.    MDM (Assessment and Plan):     Active Hospital Problems    Diagnosis    • Dyskinesia of gallbladder [K82.8]    • Elevated troponin [R77.8]    • Chest pain [R07.9]    • Hypokalemia [E87.6]    • Hyponatremia [E87.1]    • Supratherapeutic INR [R79.1]    • Anemia [D64.9]    • Thrombocytopenia (HCC) [D69.6]    • Prolonged QT interval [R94.31]    • Systolic CHF, chronic (HCC) [I50.22]    • Superior mesenteric artery stenosis (HCC) [K55.1]    • Frailty syndrome in geriatric patient [R54]    • Severe protein-calorie malnutrition (HCC) [E43]    • PVD (peripheral vascular disease) ([facility]) [I73.9]    • Anxiety [F41.9]    • Non-rheumatic mitral regurgitation [I34.0]    • Hypothyroid [E03.9]    • CAD (coronary artery disease) [I25.10]    • Mixed hyperlipidemia [E78.2]    • Essential hypertension [I10]    • Anemia in chronic kidney disease (CKD) [N18.9, D63.1]    • ESRD (end stage renal disease) on dialysis (HCC) [N18.6, Z99.2]    • C. difficile colitis [A04.72]    • Intractable nausea and vomiting [R11.2]    • Status post below knee amputation of left lower extremity [Z89.512]      66-year-old male with peripheral vascular disease and severe radiation-induced atherosclerosis of his chest with valvular disease likely caused by the same thing.  He has elevated BNP as well as elevated troponins likely secondary to all of the above.  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  I do not feel that his positive troponin at this point represents true ACS.  I would continue to treat his dialysis and elevated BNP.  I will have him follow-up as outpatient cardiologist.

## 2021-06-13 NOTE — PROGRESS NOTES
Inpatient Anticoagulation Service Note    Date: 6/13/2021    Reason for Anticoagulation:  (Documented hx of arterial thrombosis )   Target INR: 2.0 to 3.0  [id number] VASc Score: 2  HAS-BLED Score: 4   Hemoglobin Value: (!) 10.2  Hematocrit Value: (!) 32.8  Lab Platelet Value: (!) 118    INR from last 7 days     Date/Time INR Value    06/13/21 09:25:01  (!) 2.84    06/12/21 00:59:01  (!) 5.31    06/11/21 0634  (!) 2.6    06/10/21 04:42:01  (!) 3.23    06/09/21 0702  (!) 2.98    06/08/21 0550  (!) 3.31    06/07/21 06:25:01  (!) 3.33        Dose from last 7 days     Date/Time Dose (mg)    06/13/21 0800  0.5    06/12/21 0900  0    06/11/21 1702  --    06/11/21 1621  1    06/10/21 1249  0    06/09/21 1324  1.25    06/07/21 1206  1.25        Average Dose (mg):  (Documented warfarin home dose:2.5mg po daily. )  Significant Interactions: Antibiotics, Antiplatelet Medications, Aspirin, Proton Pump Inhibitor, Statin, Thyroid Medications, Other (Comments) (erythromycin)  Bridge Therapy: No     Reversal Agent Administered: Not Applicable    Comments:   67 y/o M w/ PHMx ESRD, CAD s/p multiple stents, valvular heart disease, HTN, HLD presented 6/4/2021 to [facility] with complaint of nausea, vomiting poor oral intake and weight loss of 45 pounds over the past month; then subsequently transferred to [facility] w/ possible SMA stenosis.  Takes Effient in addition to warfarin PTA for his vascular disease. INR therapeutic on admit.  06/04/21 CT: AP: No acute abdominal or pelvic findings.      Plan:  Warfarin 0.5 mg po tonight , INR in AM   Education Material Provided?: No  Pharmacist suggested discharge dosing: Warfarin 0.5-1 mg po daily , follow up with anticoagulation clinic after discharge.      Godwin Joya, PharmD, BCPS

## 2021-06-13 NOTE — PROGRESS NOTES
12 hour CC      Monitor Summary  Sinus Rhythm 80-90  R-O-F PVC, Coup, Big, Trip  .15/.17/.51

## 2021-06-13 NOTE — PROGRESS NOTES
Bedside report received and patient care assumed. Pt is resting in bed, A&Ox4, with no complaints of pain, and is on 1L. Tele box on. All fall precautions are in place, belongings at bedside table.  Pt was updated on POC, no questions or concerns. Pt educated on use of call light for assistance.

## 2021-06-14 ENCOUNTER — APPOINTMENT (OUTPATIENT)
Dept: RADIOLOGY | Facility: MEDICAL CENTER | Age: 66
DRG: 291 | End: 2021-06-14
Attending: FAMILY MEDICINE
Payer: MEDICARE

## 2021-06-14 LAB
ALBUMIN SERPL BCP-MCNC: 2.6 G/DL (ref 3.2–4.9)
ALBUMIN/GLOB SERPL: 1.3 G/DL
ALP SERPL-CCNC: 524 U/L (ref 30–99)
ALT SERPL-CCNC: 1011 U/L (ref 2–50)
ANION GAP SERPL CALC-SCNC: 14 MMOL/L (ref 7–16)
AST SERPL-CCNC: 496 U/L (ref 12–45)
BASOPHILS # BLD AUTO: 0.2 % (ref 0–1.8)
BASOPHILS # BLD: 0.02 K/UL (ref 0–0.12)
BILIRUB SERPL-MCNC: 0.9 MG/DL (ref 0.1–1.5)
BUN SERPL-MCNC: 36 MG/DL (ref 8–22)
CALCIUM SERPL-MCNC: 8.4 MG/DL (ref 8.5–10.5)
CHLORIDE SERPL-SCNC: 98 MMOL/L (ref 96–112)
CO2 SERPL-SCNC: 23 MMOL/L (ref 20–33)
CREAT SERPL-MCNC: 6.73 MG/DL (ref 0.5–1.4)
EKG IMPRESSION: NORMAL
EOSINOPHIL # BLD AUTO: 0.07 K/UL (ref 0–0.51)
EOSINOPHIL NFR BLD: 0.7 % (ref 0–6.9)
ERYTHROCYTE [DISTWIDTH] IN BLOOD BY AUTOMATED COUNT: 63.7 FL (ref 35.9–50)
GLOBULIN SER CALC-MCNC: 2 G/DL (ref 1.9–3.5)
GLUCOSE SERPL-MCNC: 69 MG/DL (ref 65–99)
HCT VFR BLD AUTO: 33.1 % (ref 42–52)
HGB BLD-MCNC: 10.1 G/DL (ref 14–18)
IMM GRANULOCYTES # BLD AUTO: 0.09 K/UL (ref 0–0.11)
IMM GRANULOCYTES NFR BLD AUTO: 0.9 % (ref 0–0.9)
INR PPP: 2.53 (ref 0.87–1.13)
LYMPHOCYTES # BLD AUTO: 1.31 K/UL (ref 1–4.8)
LYMPHOCYTES NFR BLD: 12.7 % (ref 22–41)
MCH RBC QN AUTO: 32.5 PG (ref 27–33)
MCHC RBC AUTO-ENTMCNC: 30.5 G/DL (ref 33.7–35.3)
MCV RBC AUTO: 106.4 FL (ref 81.4–97.8)
MONOCYTES # BLD AUTO: 0.84 K/UL (ref 0–0.85)
MONOCYTES NFR BLD AUTO: 8.2 % (ref 0–13.4)
NEUTROPHILS # BLD AUTO: 7.95 K/UL (ref 1.82–7.42)
NEUTROPHILS NFR BLD: 77.3 % (ref 44–72)
NRBC # BLD AUTO: 0 K/UL
NRBC BLD-RTO: 0 /100 WBC
PLATELET # BLD AUTO: 130 K/UL (ref 164–446)
PMV BLD AUTO: 11.7 FL (ref 9–12.9)
POTASSIUM SERPL-SCNC: 4.6 MMOL/L (ref 3.6–5.5)
PROT SERPL-MCNC: 4.6 G/DL (ref 6–8.2)
PROTHROMBIN TIME: 26.5 SEC (ref 12–14.6)
RBC # BLD AUTO: 3.11 M/UL (ref 4.7–6.1)
SODIUM SERPL-SCNC: 135 MMOL/L (ref 135–145)
TROPONIN T SERPL-MCNC: 2607 NG/L (ref 6–19)
TSH SERPL DL<=0.005 MIU/L-ACNC: 0.39 UIU/ML (ref 0.38–5.33)
WBC # BLD AUTO: 10.3 K/UL (ref 4.8–10.8)

## 2021-06-14 PROCEDURE — 84484 ASSAY OF TROPONIN QUANT: CPT

## 2021-06-14 PROCEDURE — 93005 ELECTROCARDIOGRAM TRACING: CPT | Performed by: FAMILY MEDICINE

## 2021-06-14 PROCEDURE — 85610 PROTHROMBIN TIME: CPT

## 2021-06-14 PROCEDURE — 700111 HCHG RX REV CODE 636 W/ 250 OVERRIDE (IP)

## 2021-06-14 PROCEDURE — 90935 HEMODIALYSIS ONE EVALUATION: CPT

## 2021-06-14 PROCEDURE — A9270 NON-COVERED ITEM OR SERVICE: HCPCS | Performed by: INTERNAL MEDICINE

## 2021-06-14 PROCEDURE — 85025 COMPLETE CBC W/AUTO DIFF WBC: CPT

## 2021-06-14 PROCEDURE — 700102 HCHG RX REV CODE 250 W/ 637 OVERRIDE(OP): Performed by: FAMILY MEDICINE

## 2021-06-14 PROCEDURE — A9270 NON-COVERED ITEM OR SERVICE: HCPCS | Performed by: FAMILY MEDICINE

## 2021-06-14 PROCEDURE — 84443 ASSAY THYROID STIM HORMONE: CPT

## 2021-06-14 PROCEDURE — 70450 CT HEAD/BRAIN W/O DYE: CPT | Mod: ME

## 2021-06-14 PROCEDURE — 700111 HCHG RX REV CODE 636 W/ 250 OVERRIDE (IP): Performed by: FAMILY MEDICINE

## 2021-06-14 PROCEDURE — 93010 ELECTROCARDIOGRAM REPORT: CPT | Performed by: INTERNAL MEDICINE

## 2021-06-14 PROCEDURE — A9270 NON-COVERED ITEM OR SERVICE: HCPCS | Performed by: STUDENT IN AN ORGANIZED HEALTH CARE EDUCATION/TRAINING PROGRAM

## 2021-06-14 PROCEDURE — 36415 COLL VENOUS BLD VENIPUNCTURE: CPT

## 2021-06-14 PROCEDURE — 307059 PAD,EAR PROTECTOR: Performed by: FAMILY MEDICINE

## 2021-06-14 PROCEDURE — 700102 HCHG RX REV CODE 250 W/ 637 OVERRIDE(OP): Performed by: STUDENT IN AN ORGANIZED HEALTH CARE EDUCATION/TRAINING PROGRAM

## 2021-06-14 PROCEDURE — 700101 HCHG RX REV CODE 250: Performed by: FAMILY MEDICINE

## 2021-06-14 PROCEDURE — 80053 COMPREHEN METABOLIC PANEL: CPT

## 2021-06-14 PROCEDURE — 99232 SBSQ HOSP IP/OBS MODERATE 35: CPT | Performed by: PHYSICIAN ASSISTANT

## 2021-06-14 PROCEDURE — 770020 HCHG ROOM/CARE - TELE (206)

## 2021-06-14 PROCEDURE — 700102 HCHG RX REV CODE 250 W/ 637 OVERRIDE(OP): Performed by: INTERNAL MEDICINE

## 2021-06-14 PROCEDURE — 5A1D70Z PERFORMANCE OF URINARY FILTRATION, INTERMITTENT, LESS THAN 6 HOURS PER DAY: ICD-10-PCS | Performed by: INTERNAL MEDICINE

## 2021-06-14 RX ORDER — WARFARIN SODIUM 1 MG/1
1 TABLET ORAL DAILY
Status: DISCONTINUED | OUTPATIENT
Start: 2021-06-14 | End: 2021-06-15

## 2021-06-14 RX ORDER — HEPARIN SODIUM 1000 [USP'U]/ML
INJECTION, SOLUTION INTRAVENOUS; SUBCUTANEOUS
Status: COMPLETED
Start: 2021-06-14 | End: 2021-06-14

## 2021-06-14 RX ORDER — HEPARIN SODIUM 1000 [USP'U]/ML
2000 INJECTION, SOLUTION INTRAVENOUS; SUBCUTANEOUS
Status: DISCONTINUED | OUTPATIENT
Start: 2021-06-14 | End: 2021-06-17 | Stop reason: HOSPADM

## 2021-06-14 RX ADMIN — ALPRAZOLAM 0.25 MG: 0.25 TABLET ORAL at 00:24

## 2021-06-14 RX ADMIN — MORPHINE SULFATE 1 MG: 4 INJECTION INTRAVENOUS at 21:12

## 2021-06-14 RX ADMIN — METOCLOPRAMIDE 5 MG: 5 INJECTION, SOLUTION INTRAMUSCULAR; INTRAVENOUS at 03:24

## 2021-06-14 RX ADMIN — Medication 2000 UNITS: at 06:22

## 2021-06-14 RX ADMIN — LEVOTHYROXINE SODIUM 150 MCG: 0.15 TABLET ORAL at 06:22

## 2021-06-14 RX ADMIN — HEPARIN SODIUM 2000 UNITS: 1000 INJECTION, SOLUTION INTRAVENOUS; SUBCUTANEOUS at 13:40

## 2021-06-14 RX ADMIN — WARFARIN SODIUM 1 MG: 1 TABLET ORAL at 19:03

## 2021-06-14 RX ADMIN — CYANOCOBALAMIN TAB 500 MCG 500 MCG: 500 TAB at 06:21

## 2021-06-14 RX ADMIN — HEPARIN SODIUM 3700 UNITS: 1000 INJECTION, SOLUTION INTRAVENOUS; SUBCUTANEOUS at 16:47

## 2021-06-14 RX ADMIN — LIDOCAINE 2 PATCH: 50 PATCH TOPICAL at 00:23

## 2021-06-14 RX ADMIN — OXYCODONE 5 MG: 5 TABLET ORAL at 03:24

## 2021-06-14 RX ADMIN — RANOLAZINE 500 MG: 500 TABLET, FILM COATED, EXTENDED RELEASE ORAL at 08:53

## 2021-06-14 RX ADMIN — PRASUGREL 10 MG: 10 TABLET, FILM COATED ORAL at 06:22

## 2021-06-14 RX ADMIN — METOCLOPRAMIDE 5 MG: 5 INJECTION, SOLUTION INTRAMUSCULAR; INTRAVENOUS at 11:17

## 2021-06-14 RX ADMIN — OXYCODONE HYDROCHLORIDE AND ACETAMINOPHEN 500 MG: 500 TABLET ORAL at 06:22

## 2021-06-14 RX ADMIN — RANOLAZINE 500 MG: 500 TABLET, FILM COATED, EXTENDED RELEASE ORAL at 19:03

## 2021-06-14 ASSESSMENT — PAIN DESCRIPTION - PAIN TYPE
TYPE: ACUTE PAIN
TYPE: ACUTE PAIN;CHRONIC PAIN
TYPE: ACUTE PAIN
TYPE: ACUTE PAIN
TYPE: ACUTE PAIN;CHRONIC PAIN
TYPE: ACUTE PAIN
TYPE: ACUTE PAIN;CHRONIC PAIN

## 2021-06-14 ASSESSMENT — PAIN SCALES - WONG BAKER: WONGBAKER_NUMERICALRESPONSE: DOESN'T HURT AT ALL

## 2021-06-14 NOTE — PROGRESS NOTES
Bedside report received and patient care assumed. Pt is resting in bed, A&OX4, with  complaints of generalized body and CP pain 6/10, and is on 4L via NC. Tele box on. All fall precautions are in place, belongings at bedside table.  Pt was updated on POC, no questions or concerns. Pt educated on use of call light for assistance.

## 2021-06-14 NOTE — CARE PLAN
The patient is Watcher - Medium risk of patient condition declining or worsening    Shift Goals  Clinical Goals: Increase dietary intake, Manage pain, Improve F&E balance  Patient Goals: Rest, pain control   Family Goals: NA    Progress made toward(s) clinical / shift goals:    Problem: Hemodynamics  Goal: Patient's hemodynamics, fluid balance and neurologic status will be stable or improve  Outcome: Progressing     Problem: Urinary - Renal Perfusion  Goal: Ability to achieve and maintain adequate renal perfusion and functioning will improve  Outcome: Not Progressing     Problem: Fall Risk  Goal: Patient will remain free from falls  Outcome: Progressing       Patient is not progressing towards the following goals:NA      Problem: Urinary - Renal Perfusion  Goal: Ability to achieve and maintain adequate renal perfusion and functioning will improve  Outcome: Not Progressing

## 2021-06-14 NOTE — PROGRESS NOTES
[facility] -  PROGRESS NOTE               Author: Arsalan Lawson M.D.   Date & Time: 6/14/2021  9:41 AM     HPI:  65 yo M PMH ESRD CCPD, HTN, anemia of CKD, CKD-MBD, HLD, and CAD who presents to ED with CC as above.  He has chronic intermittent N/V that he's been battling for a long time.  It leads to wide fluctuations in his po intake.  He also has severe CAD and non-cardiac atherosclerosis.  Recently he feel she has lost 45 lbs in the past month due to the N/V.  He's had a CTA in past that has revealed SMA steosis, but report was not available here at this institution.  Repeat imaging ordered by admit MD and he was admitted for further evaluation.  Has no abd pain and otherwise denies any associated F/C/CP/SOB.  No melena, hematochezia, hematemesis.  No HA, visual changes.    DAILY NEPHROLOGY SUMMARY:  6/05 - Consult done  6/06 - NAEO, +N/V; Unable to keep majority of food down  6/07 - sitting up in bed, still having poor intake vomited this am, PD UF: 461ml, had UGI: WNL, had large BM this am   6/08: sitting up in bed, feels miserable, weak and nauseated, for EGD today, discussed case with Dr. Rivas, dont believe PD is contributing factor as pt BUN is only mildly elevated and pt has been on PD for > 1 year, if GI workup neg could consider transitioning to HD to see if helps elevate symptoms, tolerated PD with UF: 450ml  6/09: Ambulating in room. C/o continued N/V and dehydration. S/p EGD w/bx's of stomach/duodenum.  318 mL net UF. GES today.  6/10: Nauseous with small amount of emesis this am. Does not feel he can tolerate food with GES this am. Primary RN at . 473 Net UF CCPD.   6/11: Unable to complete GES due to nausea. Continues to have nausea.   6/12; again unable to complete GES due to nausea; had a permacath placed but hemodialysis plan was held as became hypotensive and rapid response was called, received IV fluid boluses for resuscitation, cardiology was consulted, seen on  "hemodialysis this a.m., blood pressure better, is very weak  6/13: Had first hemodialysis treatment yesterday, tolerated well, nausea slightly better still feels very weak and has poor appetite  6/14: No new overnight renal events. HD tolerated well with net UF of 1.0 L    REVIEW OF SYSTEMS:    10 point ROS reviewed and is as per HPI/daily summary or otherwise negative    PMH/PSH/SH/FH: Reviewed and unchanged since admission note  CURRENT MEDICATIONS: Reviewed from admission to present day    VS:  /57   Pulse 85   Temp 36.1 °C (97 °F) (Temporal)   Resp 18   Ht 1.778 m (5' 10\")   Wt 52.3 kg (115 lb 4.8 oz)   SpO2 97%   BMI 16.54 kg/m²   Physical Exam  Nursing note reviewed.   Constitutional:       General: He is not in acute distress.     Appearance: Normal appearance. He is ill-appearing. He is not diaphoretic.      Comments: Cachectic    HENT:      Head: Normocephalic and atraumatic.   Neck:      Trachea: No tracheal tenderness.   Cardiovascular:      Rate and Rhythm: Normal rate.      Heart sounds: Murmur heard.   No friction rub. No gallop.       Comments: No edema  Pulmonary:      Effort: Pulmonary effort is normal. No respiratory distress.   Abdominal:      General: There is no distension.      Comments: PD Cath in place   Musculoskeletal:         General: No deformity.      Comments: L BKA w/prosthesis   Skin:     General: Skin is warm and dry.      Coloration: Skin is not jaundiced.      Findings: No rash.   Neurological:      Mental Status: He is alert and oriented to person, place, and time.      Motor: No atrophy.   Psychiatric:         Behavior: Behavior normal.     Fluids:  In: 740 [P.O.:240; Dialysis:500]  Out: 1500     LABS:  Recent Labs     06/12/21  0059 06/13/21  0925 06/14/21  0241   SODIUM 135 134* 135   POTASSIUM 3.9 4.9 4.6   CHLORIDE 101 97 98   CO2 19* 20 23   GLUCOSE 78 44* 69   BUN 62* 58* 36*   CREATININE 10.55* 10.30* 6.73*   CALCIUM 7.0* 8.4* 8.4*       IMPRESSION:  # " ESRD   - Etiology likely 2/2 HTN   -was on CCPD now switched to HD this admission  #Cachexia, unexplained 40 pound weight loss, intractable nausea vomiting -  - Followed by GI and has had extensive work up, upper GI: WNL , EGD 6/8    -Bx stomach and duodenum done - Path pending    -EGD essentially normal; no evidence of stricture, no erosive gastritis   -Unable to complete gastric emptying study x3   -On IV reglan and IV erythromycin x3 doses   -Considering outpatient Smart pill study  # HTN now hypotensive   -hold blood pressure medicines   #cardiomyopathy, chronic systolic heart failure, severe CAD, severe MR   -EF of 30% and severe MR.     -Needs cath pe cardiology once INR is under 1.5.  # Anemia of CKD   - Goal Hgb 10-11   - Stable  # CKD-MBD   - managed at outpt clinic    - hold phos binder for now with N/V  # Hypokalemia, improved    -Was on PD so hypokalemia expected   # Hyponatremia, mild   -Better, monitoring  #Coagulopathy on warfarin     PLAN:  -Switched from CCPD to hemodialysis to address uremia and see if this improves his GI symptoms -severe nausea and vomiting  -S/P tunneled cath placement 6/11/21; HD #3 today  -No more CCPD from here on  -No need for p.o. KCl since not on PD anymore   -no dietary protein restrictions; Regular diet ok  -Dose all meds per ESRD  -No fluid restrictions at this time  -Nepro with meals  -hold phos binders since phosphorus very low  -Okay to bring Liquacel Protein supplement from home       thank you and will follow with you

## 2021-06-14 NOTE — PROGRESS NOTES
[facility] Cardiology Follow-up Note    Date of Service:    6/14/2021      Name:   Francis Purvis   YOB: 1955  Age:   66 y.o.  male   MRN:   [id number]      Chief Complaint: CHF, valvular disease    Primary Cardiologist:  [facility], Dr. Nick Chirinos    HPI:  Mr Purvis is a 67 y/o fellow with PMH including testicular CA s/p whole body radiation in the 1970s, hypothyroidism, Dyslipidemia, Essential hypertension, ESRD was on PD.    He has an extensive CV hx including mod-severe mitral regurgitation, CAD, ICMO with EF 35% after inferior STEMI in 2/2021 and PCI to the RCA then LM stent on 4/30/2021 followed by PTCA to the mRCA 5/1/21 - [facility] at [facility]  He developed LBBB after his procedure on 5/2/2021 EKG from [facility].  He has PAD s/p Left BKA, s/p aortobifem bypass, has porcelain aorta.  He is on chronic warfarin therpay for PAD.      He presents with intractable N/V with a 45 lb weight loss.  Cardiology was consulted to due to elevated BNP and troponin (1502, 2128).    Interim Events:  He states he has been feeling better, less nauseated.  No vomiting in several days.  He is still not eating much, states he associates food with vomiting.   Denies chest pain or burning (which he was having prior to stent in 4/2021)  No LE swelling    Started on hemodialysis via R subclavian catheter.    ROS  Constitutional:  + fatigue.  Weight loss.  Respiratory:  Denies shortness of breath, no cough.  Cardiovascular:  No chest pain.  no lower extremity edema.  Denies orthopnea or PND.  : denies polyuria, no dysuria.  GI:  Denies nausea/vomiting.  No abdominal distention.  Neuro:  Denies dizziness, syncope.  Hem/lymph: Denies easy bleeding/bruising.      All other review of systems reviewed and negative.    Past medical, surgical, social, and family history reviewed and unchanged from admission except as noted in HPI.    Medications: Reviewed in MAR  Current Facility-Administered Medications   Medication  Dose Frequency Provider Last Rate Last Admin   • heparin injection 1,700 Units  1,700 Units DIALYSIS PRN Cj Rivas M.D.   1,700 Units at 06/13/21 1219   • heparin injection 3,700 Units  3,700 Units DIALYSIS PRN Cj Rivas M.D.   3,700 Units at 06/13/21 1414   • oxyCODONE immediate-release (ROXICODONE) tablet 5 mg  5 mg Q4HRS PRN Fito Ball M.D.   5 mg at 06/14/21 0324   • morphine (pf) 4 mg/mL injection 1-2 mg  1-2 mg Q4HRS PRN Fito Ball M.D.   2 mg at 06/13/21 1608   • gabapentin (NEURONTIN) capsule 100 mg  100 mg TID Fito Ball M.D.   100 mg at 06/13/21 1144   • lidocaine (LIDODERM) 5 % 2 Patch  2 Patch Q24HR Fito Ball M.D.   2 Patch at 06/14/21 0023   • metoclopramide (REGLAN) injection 5 mg  5 mg Q8HR Fito Ball M.D.   5 mg at 06/14/21 1117   • dexamethasone (DECADRON) injection 4 mg  4 mg Q6HRS PRN Tejinder Benson M.D.       • LORazepam (ATIVAN) injection 0.5 mg  0.5 mg Q4HRS PRN Tejinder Benson M.D.   0.5 mg at 06/12/21 1544   • MD Alert...Warfarin per Pharmacy   PHARMACY TO DOSE Tejinder Benson M.D.       • bisacodyl (DULCOLAX) suppository 10 mg  10 mg DAILY Demetrius Junior M.D.   10 mg at 06/07/21 0527   • senna-docusate (PERICOLACE or SENOKOT S) 8.6-50 MG per tablet 1 tablet  1 tablet BID Demetrius Junior M.D.   1 tablet at 06/12/21 0613   • polyethylene glycol/lytes (MIRALAX) PACKET 1 Packet  1 Packet DAILY Demetrius Junior M.D.   1 Packet at 06/12/21 0614   • lactated ringers infusion (BOLUS)  500 mL Once PRN Reyes Kaye M.D.       • enalaprilat (VASOTEC) injection 1.25 mg  1.25 mg Q6HRS PRN Reyes Kaye M.D.       • labetalol (NORMODYNE/TRANDATE) injection 10 mg  10 mg Q4HRS PRN Reyes Kaye M.D.       • guaiFENesin dextromethorphan (ROBITUSSIN DM) 100-10 MG/5ML syrup 10 mL  10 mL Q6HRS PRN Reyes Kaye M.D.       • albuterol inhaler 1-2 Puff  1-2 Puff Q6HRS PRN Reyes Kaye M.D.       • ascorbic acid (Vitamin C) tablet 500 mg  500 mg DAILY  "Reyes Kaye M.D.   500 mg at 06/14/21 0622   • [Held by provider] carvedilol (COREG) tablet 6.25 mg  6.25 mg BID WITH MEALS Reyes Kaye M.D.   6.25 mg at 06/11/21 0810   • cyanocobalamin (VITAMIN B-12) tablet 500 mcg  500 mcg DAILY Reyes Kaye M.D.   500 mcg at 06/14/21 0621   • fluticasone (FLONASE) nasal spray 100 mcg  2 Spray DAILY Reyes Kaye M.D.       • [Held by provider] isosorbide dinitrate (ISORDIL) tablet 30 mg  30 mg BID Reyes Kaye M.D.   30 mg at 06/10/21 1810   • levothyroxine (SYNTHROID) tablet 150 mcg  150 mcg QAM Reyes Kaye M.D.   150 mcg at 06/14/21 0622   • prasugrel (EFFIENT) tablet 10 mg  10 mg DAILY Reyes Kaye M.D.   10 mg at 06/14/21 0622   • ranolazine (RANEXA) 500 MG SR tablet TB12 500 mg  500 mg BID Reyes Kaye M.D.   500 mg at 06/14/21 0853   • vitamin D (cholecalciferol) tablet 2,000 Units  2,000 Units DAILY Reyes Kaye M.D.   2,000 Units at 06/14/21 0622   • ALPRAZolam (XANAX) tablet 0.25 mg  0.25 mg HS PRN Scott Ghosh M.D.   0.25 mg at 06/14/21 0024   Last reviewed on 6/9/2021  4:03 AM by Laenne Ordaz R.N.    Allergies   Allergen Reactions   • Amoxicillin-Pot Clavulanate Unspecified     body cramps  Body cramps   • Augmentin Unspecified     Body cramps   • Codeine Unspecified     HA  Tolerated Morphine 09/2020   • Promethazine Unspecified     muscle spasms       Physical Exam  Body mass index is 16.54 kg/m². /51   Pulse 88   Temp 36.1 °C (97 °F) (Temporal)   Resp 18   Ht 1.778 m (5' 10\")   Wt 52.3 kg (115 lb 4.8 oz)   SpO2 95%    Vitals:    06/14/21 0410 06/14/21 0708 06/14/21 0715 06/14/21 1104   BP: 116/53 113/57  113/51   Pulse: 83 87 85 88   Resp: 17 18  18   Temp: 36.4 °C (97.5 °F) 36.1 °C (97 °F)  36.1 °C (97 °F)   TempSrc: Temporal Temporal  Temporal   SpO2: 95% 97%  95%   Weight:       Height:        Oxygen Therapy:  Pulse Oximetry: 95 %, O2 (LPM): 4, O2 Delivery Device: Silicone Nasal Cannula    General: no apparent distress, " cachetic.  Neck: no JVD   Chest:  R subclavian central line catheter in place.  Lungs: normal effort,  With bibasilar crackles, no wheezing or rhonchi  Heart: normal rate, regular rhythm, + 2/6 systolic murmur at LSB, no rub  EXT: no lower extremity edema  Neurological: No focal deficits, no facial asymmetry.  Normal speech  Psychiatric: Appropriate affect, alert and oriented x 3  Skin: Warm extremities, no rash    Labs (personally reviewed):     Lab Results   Component Value Date/Time    SODIUM 135 06/14/2021 02:41 AM    POTASSIUM 4.6 06/14/2021 02:41 AM    CHLORIDE 98 06/14/2021 02:41 AM    CO2 23 06/14/2021 02:41 AM    GLUCOSE 69 06/14/2021 02:41 AM    BUN 36 (H) 06/14/2021 02:41 AM    CREATININE 6.73 (HH) 06/14/2021 02:41 AM    BUNCREATRAT 17 04/06/2016 07:11 AM     Lab Results   Component Value Date/Time    ALKPHOSPHAT 524 (H) 06/14/2021 02:41 AM    ASTSGOT 496 (H) 06/14/2021 02:41 AM    ALTSGPT 1011 (H) 06/14/2021 02:41 AM    TBILIRUBIN 0.9 06/14/2021 02:41 AM      Lab Results   Component Value Date/Time    CHOLSTRLTOT 83 (L) 06/12/2021 07:50 AM    LDL 30 06/12/2021 07:50 AM    HDL 36 (A) 06/12/2021 07:50 AM    TRIGLYCERIDE 83 06/12/2021 07:50 AM         Cardiac Imaging and Procedures Review:      Personal Telemetry Review:  SR in the 80s    Echo 6/6/2021:  CONCLUSIONS  No prior study is available for comparison.   Severely reduced left ventricular systolic function.  Left ventricular ejection fraction is visually estimated to be 30%.  Severe mitral regurgitation.  Moderate aortic insufficiency.  Mild tricuspid regurgitation.  Estimated right ventricular systolic pressure is 45 mmHg.    [facility] 4/30/2021 outside hosp:  Left main stent     [facility] 5/1/2021 outside hosp:  PTCA of the mid RCA, unable to deploy stent    [facility] 2/23/21 [facility]:  Acute instent thrombosis of the RCA stent with STEMI.      Assessment and Medical Decision Making:    Elevated troponin and BNP  -   Does not appear to be having ACS, no symptoms  of CP.  -   LBBB, but not new - appears to be present since 5/2/2021.  I cannot see the EKG from [facility] from that date, but his QRSd is reported as 150.  -   I suspect troponin elevation due to ESRD and known CAD      CAD  -   Recent inferior STEMI 1/2021 s/p RCA PCI at [facility]  -   LM stent and PTCA to the RCA at [facility] 4/30-5/1/2021  -   Continue Effient and Ranexa.  -   Currently not on BB, likely due to hypotension (esepcially yesterday BP were in the 90s systolic)  May resume if BP will tolerate.  -   Can also resume is isosorbide at some point if he will tolerate.    LBBB since 5/2/2021  -    See EKG from [facility] in care everywhere.    Ischemic CMO EF 30% with acute on chronic systolic HF, difficult to determine NYHA classification given his limited ability for activity.  -    Volume mgmt via HD  -    Had been on GDMT with coreg 6.25 at home, was not on ACEI/ARB/ARNI due to his renal failure, but may be a candidate at this time if ok with nephro and his BP will tolerate.    Warfarin therapy  -    INR 2.53 today.    Chronic anemia  -   Likely from chronic diseae  -   H/H stable, hgb in the 9-10 range.    Thrombocytopenia.  -   Stable, plt 130     ESRD transition from PD to HD.  -   via R subclav central line  -   BUN, Cr improved today    PAD   -   S/p L BKA  -   S/p aortobifem bypass  -   On chronic warfarin      Will discuss case with Dr. Castro, please see her attestation for other recommendations.      Ruby Buck PA-C  [facility]

## 2021-06-14 NOTE — PROGRESS NOTES
Inpatient Anticoagulation Service Note    Date: 2021  Reason for Anticoagulation: Other (Comments) (arterial thrombosis)   [id number] VASc Score: 2  HAS-BLED Score: 4    Hemoglobin Value: (!) 10.1  Hematocrit Value: (!) 33.1  Lab Platelet Value: (!) 130  Target INR: 2.0 to 3.0    INR from last 7 days     Date/Time INR Value    21 02:41:01  (!) 2.53    21 09:25:01  (!) 2.84    21 00:59:01  (!) 5.31    21 0634  (!) 2.6    06/10/21 04:42:01  (!) 3.23    21 0702  (!) 2.98    21 0550  (!) 3.31        Dose from last 7 days     Date/Time Dose (mg)    21 1333  1    21 0800  0.5    21 0900  0    21 1702  --    21 1621  1    06/10/21 1249  0    21 1324  1.25        Average Dose (mg): 2.5  Significant Interactions: Corticosteroids, Antiplatelet Medications, Thyroid Medications, Aspirin  Bridge Therapy: No (If less than 5 days and overlap therapy discontinued -- document reason (i.e. Bleed Risk))  INR Value Greater than 2 Prior to Discontinuation of Parenteral Anticoagulation: Not Applicable (If still on overlap therapy, if No -- document reason (i.e. Bleed Risk))    Reversal Agent Administered: Not Applicable  Comments: Patient continuing warfarin for arterial thrombosis. INR currently therapeutic. CBC within normal limits, no signs of bleeding. DDI as noted above. Previous warfarin doses recieved. INR goal of 2-3. Continue therapy with 1 mg warfarin. Obtain INR 6/15 to determine next dose.    Plan: Warfarin 1 mg PO tonight. Obtain INR on 6/15 AM to determine next dose  Education Material Provided?: No (home medication)  Pharmacist suggested discharge dosin.5-1 mg warfarin PO daily. Follow up with anticoagulation clinic post discharge.     Nell Burger, Pharmacy Intern

## 2021-06-14 NOTE — THERAPY
Missed Therapy     Patient Name: Francis Purvis  Age:  66 y.o., Sex:  male  Medical Record #: [id number]  Today's Date: 6/14/2021    Attempted PT treatment. Spoke with RN, pt with up trending trops now at 2128. Will hold PT until pt is medically safe for mobility. Frequency decreased to 1x/wk to monitor.     Valencia Zabala PT, DPT

## 2021-06-14 NOTE — CONSULTS
"Reason for PC Consult: Advance Care Planning    Consulted by: Dr. Hudson    Assessment:  General: 65 y/o male from [facility] n/v/weight loss and with concerns for SMA thrombosis. CT demonstrated no thrombosis. Pt with known ESRD on PD (for about 16 months), vascular disease s/p L BKA, CAD with stent placement, CHF with EF 30% and severe MR, C.Diff. Recommendation for cath lab once INR below 1.5; currently 2.53 today. Pt with elevated liver enzymes today as well. Temp cath placed for hemodialysis while hospitalized. EGD completed showing gastric polyps and non-erosive gastritis.     Social: Pt lives with his wife Casa in [place]. He has 2 kids, a son and dtr, in [place]. His wife has 2 daughters in [place]. He was mostly independent with ADLs prior to this event and tells PC he was \"mowing the lawn.\"    Consults: cardiology, nephrology, GI    Dyspnea: No-    Last BM: 06/13/21-    Pain: No-    Depression: No-    Dementia: No;       Spiritual:  Is Baptism or spirituality important for coping with this illness? No-    Has a  or spiritual provider visit been requested? No    Palliative Performance Scale: 50%    Advance Directive: None- he tells PC he and his wife are working on this. He is aware PC can help with any medical directive questions if needed.   DPOA: No-  NOK is his wife Casa  POLST: No-      Code Status: Full- discussed at length and he states that \"I would want to be resuscitated if something happened.\" He is aware that the time may come when resuscitation is not beneficial, at which time the MD would have such discussion with him.     Outcome:  PC RN met with Tk at bedside and explained the role of PC to help with discussions about the current medical situation and goals moving forward. He provided this RN with his medical history and events leading up to this admission. He endorses the N/V which wiped him out. He states prior to this beginning, he had a good appetite and felt that he ate " "well. He tells PC that he was independent in function and handling household chores like mowing the lawn without difficulty. He notes having been on dialysis for about \"16 months\" without issues. Tk has a good understanding of his current situation and plan as it pertains to dialysis and his cardiac function.    Explored pt's values, beliefs, and preferences in order to identify GOC. He tells PC that he and his wife are working on medical/financial POA papers and also that he would want her to be his decision maker should he be unable. PC queried any discussions they've had about his wishes and he tells PC that \"I would want to be resuscitated if something happened.\" He tells PC that if he were ever on a ventilator and unable to liberate, he would not want to continue on in that manner and would prefer to die naturally than remain on machines. PC highlighted the quality of life being important and he agreed. PC assessed other sources of support and he tells PC \"my wife and I feel we talk directly to God\" and denied a  visit for extra support.     Tk denied any questions/needs. While talking to Tk, PC RN provided therapeutic communication, including open ended questions, reflective listening, and normalization of thought and feelings throughout encounter, as well as reinforced his goals of care. PC contact information provided to Tk and encouraged to call with any questions or concerns.     Recommendations: I do not recommend an ethics or hospice consult at this time because pt not interested.    Updated: MD/RN    Plan: follow and support    Thank you for allowing Palliative Care to support this patient and family. Contact [phone] for additional assistance, change in patient status, or with any questions/concerns.   "

## 2021-06-15 LAB
ALBUMIN SERPL BCP-MCNC: 2.5 G/DL (ref 3.2–4.9)
ALBUMIN/GLOB SERPL: 1.2 G/DL
ALP SERPL-CCNC: 518 U/L (ref 30–99)
ALT SERPL-CCNC: 1554 U/L (ref 2–50)
ANION GAP SERPL CALC-SCNC: 15 MMOL/L (ref 7–16)
AST SERPL-CCNC: 924 U/L (ref 12–45)
BASOPHILS # BLD AUTO: 0.4 % (ref 0–1.8)
BASOPHILS # BLD: 0.04 K/UL (ref 0–0.12)
BILIRUB SERPL-MCNC: 1 MG/DL (ref 0.1–1.5)
BUN SERPL-MCNC: 28 MG/DL (ref 8–22)
CALCIT SERPL-MCNC: 6.4 PG/ML (ref 0–7.5)
CALCIUM SERPL-MCNC: 8.4 MG/DL (ref 8.5–10.5)
CHLORIDE SERPL-SCNC: 98 MMOL/L (ref 96–112)
CO2 SERPL-SCNC: 22 MMOL/L (ref 20–33)
CREAT SERPL-MCNC: 4.79 MG/DL (ref 0.5–1.4)
EKG IMPRESSION: NORMAL
EOSINOPHIL # BLD AUTO: 0.05 K/UL (ref 0–0.51)
EOSINOPHIL NFR BLD: 0.5 % (ref 0–6.9)
ERYTHROCYTE [DISTWIDTH] IN BLOOD BY AUTOMATED COUNT: 64.1 FL (ref 35.9–50)
GLOBULIN SER CALC-MCNC: 2.1 G/DL (ref 1.9–3.5)
GLUCOSE SERPL-MCNC: 87 MG/DL (ref 65–99)
HCT VFR BLD AUTO: 34.5 % (ref 42–52)
HGB BLD-MCNC: 11 G/DL (ref 14–18)
IMM GRANULOCYTES # BLD AUTO: 0.07 K/UL (ref 0–0.11)
IMM GRANULOCYTES NFR BLD AUTO: 0.7 % (ref 0–0.9)
INR PPP: 3.48 (ref 0.87–1.13)
LYMPHOCYTES # BLD AUTO: 1.35 K/UL (ref 1–4.8)
LYMPHOCYTES NFR BLD: 14 % (ref 22–41)
MCH RBC QN AUTO: 33.6 PG (ref 27–33)
MCHC RBC AUTO-ENTMCNC: 31.9 G/DL (ref 33.7–35.3)
MCV RBC AUTO: 105.5 FL (ref 81.4–97.8)
MONOCYTES # BLD AUTO: 0.94 K/UL (ref 0–0.85)
MONOCYTES NFR BLD AUTO: 9.7 % (ref 0–13.4)
NEUTROPHILS # BLD AUTO: 7.2 K/UL (ref 1.82–7.42)
NEUTROPHILS NFR BLD: 74.7 % (ref 44–72)
NRBC # BLD AUTO: 0.03 K/UL
NRBC BLD-RTO: 0.3 /100 WBC
PLATELET # BLD AUTO: 127 K/UL (ref 164–446)
PMV BLD AUTO: 11.5 FL (ref 9–12.9)
POTASSIUM SERPL-SCNC: 4.4 MMOL/L (ref 3.6–5.5)
PROT SERPL-MCNC: 4.6 G/DL (ref 6–8.2)
PROTHROMBIN TIME: 33.9 SEC (ref 12–14.6)
RBC # BLD AUTO: 3.27 M/UL (ref 4.7–6.1)
SODIUM SERPL-SCNC: 135 MMOL/L (ref 135–145)
WBC # BLD AUTO: 9.7 K/UL (ref 4.8–10.8)

## 2021-06-15 PROCEDURE — 770020 HCHG ROOM/CARE - TELE (206)

## 2021-06-15 PROCEDURE — A9270 NON-COVERED ITEM OR SERVICE: HCPCS | Performed by: STUDENT IN AN ORGANIZED HEALTH CARE EDUCATION/TRAINING PROGRAM

## 2021-06-15 PROCEDURE — 700102 HCHG RX REV CODE 250 W/ 637 OVERRIDE(OP): Performed by: PHYSICIAN ASSISTANT

## 2021-06-15 PROCEDURE — A9270 NON-COVERED ITEM OR SERVICE: HCPCS | Performed by: INTERNAL MEDICINE

## 2021-06-15 PROCEDURE — 85610 PROTHROMBIN TIME: CPT

## 2021-06-15 PROCEDURE — A9270 NON-COVERED ITEM OR SERVICE: HCPCS | Performed by: PHYSICIAN ASSISTANT

## 2021-06-15 PROCEDURE — 80053 COMPREHEN METABOLIC PANEL: CPT

## 2021-06-15 PROCEDURE — 700111 HCHG RX REV CODE 636 W/ 250 OVERRIDE (IP): Performed by: FAMILY MEDICINE

## 2021-06-15 PROCEDURE — 99233 SBSQ HOSP IP/OBS HIGH 50: CPT | Performed by: INTERNAL MEDICINE

## 2021-06-15 PROCEDURE — 700111 HCHG RX REV CODE 636 W/ 250 OVERRIDE (IP): Performed by: HOSPITALIST

## 2021-06-15 PROCEDURE — 85025 COMPLETE CBC W/AUTO DIFF WBC: CPT

## 2021-06-15 PROCEDURE — 700102 HCHG RX REV CODE 250 W/ 637 OVERRIDE(OP): Performed by: STUDENT IN AN ORGANIZED HEALTH CARE EDUCATION/TRAINING PROGRAM

## 2021-06-15 PROCEDURE — 93010 ELECTROCARDIOGRAM REPORT: CPT | Performed by: INTERNAL MEDICINE

## 2021-06-15 PROCEDURE — A9270 NON-COVERED ITEM OR SERVICE: HCPCS | Performed by: FAMILY MEDICINE

## 2021-06-15 PROCEDURE — 36415 COLL VENOUS BLD VENIPUNCTURE: CPT

## 2021-06-15 PROCEDURE — 700102 HCHG RX REV CODE 250 W/ 637 OVERRIDE(OP): Performed by: INTERNAL MEDICINE

## 2021-06-15 PROCEDURE — 99232 SBSQ HOSP IP/OBS MODERATE 35: CPT | Performed by: INTERNAL MEDICINE

## 2021-06-15 PROCEDURE — 700101 HCHG RX REV CODE 250: Performed by: FAMILY MEDICINE

## 2021-06-15 PROCEDURE — 700102 HCHG RX REV CODE 250 W/ 637 OVERRIDE(OP): Performed by: FAMILY MEDICINE

## 2021-06-15 RX ADMIN — LEVOTHYROXINE SODIUM 150 MCG: 0.15 TABLET ORAL at 06:23

## 2021-06-15 RX ADMIN — OXYCODONE 5 MG: 5 TABLET ORAL at 00:43

## 2021-06-15 RX ADMIN — OXYCODONE HYDROCHLORIDE AND ACETAMINOPHEN 500 MG: 500 TABLET ORAL at 06:23

## 2021-06-15 RX ADMIN — PRASUGREL 10 MG: 10 TABLET, FILM COATED ORAL at 06:23

## 2021-06-15 RX ADMIN — LIDOCAINE 2 PATCH: 50 PATCH TOPICAL at 00:43

## 2021-06-15 RX ADMIN — MORPHINE SULFATE 2 MG: 4 INJECTION INTRAVENOUS at 16:23

## 2021-06-15 RX ADMIN — OXYCODONE 5 MG: 5 TABLET ORAL at 15:29

## 2021-06-15 RX ADMIN — OXYCODONE 5 MG: 5 TABLET ORAL at 20:17

## 2021-06-15 RX ADMIN — Medication 2000 UNITS: at 06:23

## 2021-06-15 RX ADMIN — ALPRAZOLAM 0.25 MG: 0.25 TABLET ORAL at 15:28

## 2021-06-15 RX ADMIN — GABAPENTIN 100 MG: 100 CAPSULE ORAL at 06:23

## 2021-06-15 RX ADMIN — NITROGLYCERIN 0.5 INCH: 20 OINTMENT TOPICAL at 18:00

## 2021-06-15 RX ADMIN — LORAZEPAM 0.5 MG: 2 INJECTION INTRAMUSCULAR; INTRAVENOUS at 20:17

## 2021-06-15 RX ADMIN — ALPRAZOLAM 0.25 MG: 0.25 TABLET ORAL at 00:43

## 2021-06-15 ASSESSMENT — ENCOUNTER SYMPTOMS
ABDOMINAL PAIN: 0
DIAPHORESIS: 0
WEIGHT LOSS: 1
FEVER: 0
DEPRESSION: 0
NERVOUS/ANXIOUS: 0
NAUSEA: 1
MYALGIAS: 0
SENSORY CHANGE: 0
COUGH: 0
HEARTBURN: 0
WEAKNESS: 1
FOCAL WEAKNESS: 0
PALPITATIONS: 0
DIZZINESS: 0
HEADACHES: 0
SHORTNESS OF BREATH: 0
CHILLS: 0

## 2021-06-15 ASSESSMENT — PAIN DESCRIPTION - PAIN TYPE
TYPE: ACUTE PAIN

## 2021-06-15 ASSESSMENT — FIBROSIS 4 INDEX: FIB4 SCORE: 12.37

## 2021-06-15 NOTE — CARE PLAN
"The patient is Watcher - Medium risk of patient condition declining or worsening    Shift Goals  Clinical Goals: increase dietary intake  Patient Goals: rest  Family Goals: na    Progress made toward(s) clinical / shift goals:    Problem: Knowledge Deficit - Standard  Goal: Patient and family/care givers will demonstrate understanding of plan of care, disease process/condition, diagnostic tests and medications  Outcome: Progressing     Problem: Fall Risk  Goal: Patient will remain free from falls  Outcome: Progressing       Patient is not progressing towards the following goals:      Problem: Urinary - Renal Perfusion  Goal: Ability to achieve and maintain adequate renal perfusion and functioning will improve  Outcome: Not Progressing  Note: Patient is ESRD     Problem: Pain - Standard  Goal: Alleviation of pain or a reduction in pain to the patient’s comfort goal  Outcome: Not Progressing  Note: Patient was experiencing chest pain at the beginning of the shift. Medicated per MAR. MD aware.     Problem: Nutrition  Goal: Patient's nutritional and fluid intake will be adequate or improve  Outcome: Not Progressing  Note: Patient states \" I am not feeling very hungry\"     Problem: Bowel Elimination  Goal: Establish and maintain regular bowel function  Outcome: Not Progressing  Note: Patient experiencing multiple loose bowel movements     "

## 2021-06-15 NOTE — PROGRESS NOTES
Patient complains of chest pain/ pressure. Vitals taken. MD White notified. Orders received. Cardiology paged and updated. Patient received morphine per MAR. Will continue to monitor.

## 2021-06-15 NOTE — PROGRESS NOTES
3hr HD started @ 1343 and completed @ 1644,tx well tolerated,net UF = 1000ml as ordered.VSS post HD,RIJ TDC dressing CDI.Had small soft BM post tx,cleaned,report given to Stefanie De Anda RN.

## 2021-06-15 NOTE — PROGRESS NOTES
[facility] Medicine Daily Progress Note    Date of Service  6/15/2021    Chief Complaint  66 y.o. male presented 6/4/2021 with nausea and vomiting    Hospital Course  Admitted with nausea and vomiting, weight loss.  He had a CT abdomen and pelvis done in an outside facility which showed possible SMA stenosis.  CT scan of the abdomen pelvis here was noted to be negative.  Gastroenterology was consulted case, EGD was done which showed gastric polyps, nonerosive gastritis.  Patient was unable to do a gastric emptying scan.  Patient also with known history of end-stage renal disease on peritoneal dialysis.  It was felt that this may be contributing to his nausea and vomiting.  A right internal jugular hemodialysis catheter was placed on 6/11/2021, and hemodialysis has been initiated.  Also apparently developed chest pain, his troponin was markedly elevated at 1500, cardiology has been consulted on the case.  Known history of CAD with stents, and chronic systolic CHF.    Interval Problem Update  Nausea and vomiting - resolved  Feels better    Chest pain - none currently  HTN - sbp   CHF - EF 30%    Updates given and plan of care discussed with patient's spouse.    Consultants/Specialty  Nephrology  Gastroenterology  Cardiology    Code Status  Full Code    Disposition  TBD    Review of Systems  Review of Systems   Constitutional: Positive for weight loss. Negative for chills, diaphoresis, fever and malaise/fatigue.   HENT: Negative for hearing loss and nosebleeds.    Respiratory: Negative for cough and shortness of breath.    Cardiovascular: Negative for chest pain, palpitations and leg swelling.   Gastrointestinal: Positive for nausea. Negative for abdominal pain and heartburn.   Genitourinary: Negative for dysuria and hematuria.   Musculoskeletal: Negative for joint pain and myalgias.   Skin: Negative for rash.   Neurological: Positive for weakness. Negative for dizziness, sensory change, focal weakness and  headaches.   Psychiatric/Behavioral: Negative for depression. The patient is not nervous/anxious.         Physical Exam  Temp:  [33.7 °C (92.7 °F)-36.8 °C (98.3 °F)] 36 °C (96.8 °F)  Pulse:  [60-90] 84  Resp:  [17-20] 17  BP: (104-118)/(48-60) 105/51  SpO2:  [90 %-100 %] 99 %    Physical Exam  Vitals and nursing note reviewed.   Constitutional:       General: He is not in acute distress.     Appearance: He is ill-appearing. He is not toxic-appearing.      Comments: Cachetic   HENT:      Head: Normocephalic and atraumatic.      Mouth/Throat:      Mouth: Mucous membranes are moist.   Eyes:      Extraocular Movements: Extraocular movements intact.      Conjunctiva/sclera: Conjunctivae normal.      Pupils: Pupils are equal, round, and reactive to light.   Cardiovascular:      Rate and Rhythm: Normal rate and regular rhythm.      Heart sounds: Murmur heard.     Pulmonary:      Effort: Pulmonary effort is normal.      Breath sounds: Rales present.   Abdominal:      General: Bowel sounds are normal. There is no distension.      Palpations: Abdomen is soft.      Tenderness: There is no abdominal tenderness. There is no guarding.      Comments: PD catheter    Musculoskeletal:         General: No swelling or tenderness.      Right lower leg: No edema.      Left lower leg: No edema.      Comments: Left BKA    Skin:     General: Skin is warm and dry.   Neurological:      General: No focal deficit present.      Mental Status: He is alert and oriented to person, place, and time.      Cranial Nerves: No cranial nerve deficit.      Sensory: No sensory deficit.      Motor: Weakness present.      Coordination: Coordination normal.   Psychiatric:         Mood and Affect: Mood is anxious.         Fluids    Intake/Output Summary (Last 24 hours) at 6/15/2021 1336  Last data filed at 6/14/2021 2200  Gross per 24 hour   Intake 860 ml   Output 1500 ml   Net -640 ml       Laboratory  Recent Labs     06/13/21  0996 06/14/21  0243  06/15/21  0248   WBC 10.1 10.3 9.7   RBC 3.06* 3.11* 3.27*   HEMOGLOBIN 10.2* 10.1* 11.0*   HEMATOCRIT 32.8* 33.1* 34.5*   .2* 106.4* 105.5*   MCH 33.3* 32.5 33.6*   MCHC 31.1* 30.5* 31.9*   RDW 65.5* 63.7* 64.1*   PLATELETCT 118* 130* 127*   MPV 11.5 11.7 11.5     Recent Labs     06/13/21  0925 06/14/21  0241 06/15/21  0248   SODIUM 134* 135 135   POTASSIUM 4.9 4.6 4.4   CHLORIDE 97 98 98   CO2 20 23 22   GLUCOSE 44* 69 87   BUN 58* 36* 28*   CREATININE 10.30* 6.73* 4.79*   CALCIUM 8.4* 8.4* 8.4*     Recent Labs     06/13/21 0925 06/14/21 0241 06/15/21  0248   INR 2.84* 2.53* 3.48*               Imaging  CT-HEAD W/O   Final Result      1.  No acute intracranial abnormality      2.  Cerebral volume loss and white matter change      NM-BILIARY (HIDA) SCAN WITH CCK   Final Result         1. No evidence of acute cholecystitis.      2. Low gallbladder ejection fraction with no response to CCK. This could relate to biliary dyskinesia.         DX-CHEST-PORTABLE (1 VIEW)   Final Result      No acute cardiopulmonary abnormality.      IR-ROSALES,GROSHONG PLACEMENT >5   Final Result      1. ULTRASOUND AND FLUOROSCOPIC GUIDED PLACEMENT OF A Right INTERNAL JUGULAR 14.5 [place] HemoSplit TUNNELED DIALYSIS CATHETER.      2. THE HEMODIALYSIS CATHETER MAY BE USED IMMEDIATELY AS CLINICALLY INDICATED. FLUSHES PER PROTOCOL.      3. FOCAL TORTUOSITY AND NARROWING AT THE DISTAL RIGHT INTERNAL JUGULAR VEIN-RIGHT BRACHIOCEPHALIC JUNCTION.      DX-UPPER GI-SERIES WITH KUB   Final Result      1.  Unremarkable upper GI exam.         EC-ECHOCARDIOGRAM COMPLETE W/O CONT   Final Result      CT-CHEST (THORAX) W/O   Final Result      1.  Abnormal lung parenchyma      2.  Specifically, there are multifocal bilateral airspace process, 14 mm right middle lobe groundglass density, posterior aspect right lower lobe 12 mm area of spiculation and probably underlying emphysema.      3.  Pulmonary findings are nonspecific and could be  inflammation, infection, or postinflammatory scarring. Neoplasm is less likely.      4.  Dense aortic and branch vessel atherosclerotic plaque      5.  Pulmonary artery hypertension      6.  Very small bilateral pleural effusion      Fleischner Society pulmonary nodule recommendations:      Ground Glass and Part Solid: No routine follow-up      Comments: In certain suspicious nodules less than 6 mm, consider follow-up at 2 and 4 years. If solid component(s) or growth develops, consider resection.      Note: These recommendations do not apply to lung cancer screening, patients with immunosuppression, or patients with known primary cancer.      Fleischner Society 2017 Guidelines for Management of Incidentally Detected Pulmonary Nodules in Adults      OUTSIDE IMAGES-US ABDOMEN   Final Result      OUTSIDE IMAGES-DX CHEST   Final Result      CT-ABDOMEN-PELVIS WITH   Final Result   Addendum 1 of 1   On further review, the SMA and celiac axis are noted to be patent.    Contrast is difficult to confirm within the diminutive and markedly    atherosclerotic renal arteries.      Final      1.  No acute abdominal or pelvic findings.   2.  Bilaterally small atrophic appearing kidneys consistent with renal failure, with a dialysis catheter noted in the pelvic cavity.   3.  Colonic diverticulosis without diverticulitis.           Assessment/Plan  * Intractable nausea and vomiting- (present on admission)  Assessment & Plan  EGD - Gastric polyps, Non-erosive gastritis. 6/8/2021  Off IV Erythromycin  stop IV Reglan       Elevated LFTs- (present on admission)  Assessment & Plan  Follow cmp  US - Mildly hydropic gallbladder. No sonographic signs of acute cholecystitis  HIDA scan - No evidence of acute cholecystitis. Low gallbladder ejection fraction with no response to CCK. This could relate to biliary dyskinesia.  Hepatic congestion, on HD  monitor    Dyskinesia of gallbladder- (present on admission)  Assessment & Plan  May need  Surgical consult if with persistent Nausea and Vomiting    Thrombocytopenia (HCC)- (present on admission)  Assessment & Plan  Follow cbc    Anemia- (present on admission)  Assessment & Plan  H/h stable    Supratherapeutic INR  Assessment & Plan  Monitor for signs of bleeding    Hyponatremia- (present on admission)  Assessment & Plan  Follow bmp    Hypokalemia- (present on admission)  Assessment & Plan  follow bmp    Chest pain  Assessment & Plan  Multimodal pain management - Lidocaine patches, Neurontin, Oyxcodone and IV Morphine prn, stop Tylenol    Elevated troponin- (present on admission)  Assessment & Plan  Effient  Cardiology has recommended outpatient follow up    Prolonged QT interval- (present on admission)  Assessment & Plan  EKG 6/12/2021 QTc - 598  EKG 6/14/2021 QTc - 600  Telemetry monitoring      Systolic CHF, chronic (HCC)- (present on admission)  Assessment & Plan  Holding Coreg, Isordil    Anxiety- (present on admission)  Assessment & Plan  Xanax prn    PVD (peripheral vascular disease) ([facility])- (present on admission)  Assessment & Plan  s/p left BKA  Coumadin    Severe protein-calorie malnutrition (HCC)- (present on admission)  Assessment & Plan  Nutrition consult    Frailty syndrome in geriatric patient- (present on admission)  Assessment & Plan  Palliative care      Superior mesenteric artery stenosis (HCC)- (present on admission)  Assessment & Plan  CT negative    Non-rheumatic mitral regurgitation- (present on admission)  Assessment & Plan  Severe     CAD (coronary artery disease)- (present on admission)  Assessment & Plan  History of stents  Effient, Ranexa  Holding Coreg and Isordil due to low blood pressure  Holding ASA    6/15 cardiology following, Dr. Matthew Noel current management      Essential hypertension- (present on admission)  Assessment & Plan  Hold Coreg, Isordil    Anemia in chronic kidney disease (CKD)- (present on admission)  Assessment & Plan  Follow cbc    C. difficile  colitis- (present on admission)  Assessment & Plan  History of    ESRD (end stage renal disease) on dialysis (HCC)- (present on admission)  Assessment & Plan  Previously on PD  Right Internal Jugular Tunneled Hemodialysis Catheter Placement. 6/11/2021  HD per Nephrology    6/15 on HD, tolerating well  Nausea improved, cont HD, per nephrology      Status post below knee amputation of left lower extremity- (present on admission)  Assessment & Plan  PT and OT    Mixed hyperlipidemia- (present on admission)  Assessment & Plan  Lipitor    Hypothyroid- (present on admission)  Assessment & Plan  Synthroid       VTE prophylaxis: Coumadin

## 2021-06-15 NOTE — PROGRESS NOTES
Assumed care this pm from day shift RN. Patient holding conversation, patient expressing some indigestion . Patient AxO 4, O2 @ 2L thru NC, VSS. Call bell and personal items within reach, bed locked in low position. Bed exit alarm on . Hourly rounding in place. Tele box in place, monitor room notified.

## 2021-06-15 NOTE — PROGRESS NOTES
[facility] Cardiology Follow-up Note    Date of Service:    6/15/2021      Name:   Francis Purvis   YOB: 1955  Age:   66 y.o.  male   MRN:   [id number]      Chief Complaint: CHF, valvular disease    Primary Cardiologist:  [facility], Dr. Nick Chirinso    HPI:  Mr Purvis is a 65 y/o fellow with PMH including testicular CA s/p whole body radiation in the 1970s, hypothyroidism, Dyslipidemia, Essential hypertension, ESRD was on PD.    He has an extensive CV hx including mod-severe mitral regurgitation, CAD, ICMO with EF 35% after inferior STEMI in 2/2021 and PCI to the RCA then LM stent on 4/30/2021 followed by PTCA to the mRCA 5/1/21 - [facility] at [facility].  He developed LBBB after his procedure on 5/2/2021 EKG from [facility].  He has PAD s/p Left BKA, s/p aortobifem bypass, has porcelain aorta.  He is on chronic warfarin therpay for PAD.      He presents with intractable N/V with a 45 lb weight loss.  Cardiology was consulted to due to elevated BNP and troponin (1502, 2128).    Interim Events:  Had some chest pain overnight.   States felt like his angina from before his last stents.   The pain was relieved by morphine.   His liver enzymes continue to trend up.   Bilirubin normal.   Continues on hemodialysis via R subclavian catheter.    ROS  Constitutional:  + fatigue.  Weight loss.  Respiratory:  Denies shortness of breath, no cough.  Cardiovascular:  No chest pain.  no lower extremity edema.  Denies orthopnea or PND.  : denies polyuria, no dysuria.  GI:  Denies nausea/vomiting.  No abdominal distention.  Neuro:  Denies dizziness, syncope.  Hem/lymph: Denies easy bleeding/bruising.      All other review of systems reviewed and negative.    Past medical, surgical, social, and family history reviewed and unchanged from admission except as noted in HPI.    Medications: Reviewed in MAR  Current Facility-Administered Medications   Medication Dose Frequency Provider Last Rate Last Admin   • MD  Alert...Warfarin per Pharmacy   PHARMACY TO DOSE Fito Ball M.D.       • warfarin (COUMADIN) tablet 1 mg  1 mg DAILY AT 1800 Fito Ball M.D.   1 mg at 06/14/21 1903   • heparin injection 2,000 Units  2,000 Units DIALYSIS PRN Arsalan Lawson M.D.   2,000 Units at 06/14/21 1340   • heparin injection 3,700 Units  3,700 Units DIALYSIS PRN Cj Rivas M.D.   3,700 Units at 06/14/21 1647   • oxyCODONE immediate-release (ROXICODONE) tablet 5 mg  5 mg Q4HRS PRN Fito Ball M.D.   5 mg at 06/15/21 0043   • morphine (pf) 4 mg/mL injection 1-2 mg  1-2 mg Q4HRS PRN Fito Ball M.D.   1 mg at 06/14/21 2112   • gabapentin (NEURONTIN) capsule 100 mg  100 mg TID Fito Ball M.D.   100 mg at 06/15/21 0623   • lidocaine (LIDODERM) 5 % 2 Patch  2 Patch Q24HR Fito Ball M.D.   2 Patch at 06/15/21 0043   • dexamethasone (DECADRON) injection 4 mg  4 mg Q6HRS PRN Tejinder Benson M.D.       • LORazepam (ATIVAN) injection 0.5 mg  0.5 mg Q4HRS PRN Tejinder Benson M.D.   0.5 mg at 06/12/21 1544   • bisacodyl (DULCOLAX) suppository 10 mg  10 mg DAILY Demetrius Junior M.D.   10 mg at 06/07/21 0527   • senna-docusate (PERICOLACE or SENOKOT S) 8.6-50 MG per tablet 1 tablet  1 tablet BID Demetrius Junior M.D.   1 tablet at 06/12/21 0613   • polyethylene glycol/lytes (MIRALAX) PACKET 1 Packet  1 Packet DAILY Demetrius Junior M.D.   1 Packet at 06/12/21 0614   • lactated ringers infusion (BOLUS)  500 mL Once PRN Reyes Kaye M.D.       • enalaprilat (VASOTEC) injection 1.25 mg  1.25 mg Q6HRS PRN Reyes Kaye M.D.       • labetalol (NORMODYNE/TRANDATE) injection 10 mg  10 mg Q4HRS PRN Reyes Kaye M.D.       • guaiFENesin dextromethorphan (ROBITUSSIN DM) 100-10 MG/5ML syrup 10 mL  10 mL Q6HRS PRN Reyes Kaye M.D.       • albuterol inhaler 1-2 Puff  1-2 Puff Q6HRS PRN Reyes Kaye M.D.       • ascorbic acid (Vitamin C) tablet 500 mg  500 mg DAILY Reyes Kaye M.D.   500 mg at 06/15/21 0623   • [Held by  "provider] carvedilol (COREG) tablet 6.25 mg  6.25 mg BID WITH MEALS Reyes Kaye M.D.   6.25 mg at 06/11/21 0810   • cyanocobalamin (VITAMIN B-12) tablet 500 mcg  500 mcg DAILY Reyes Kaye M.D.   500 mcg at 06/14/21 0621   • fluticasone (FLONASE) nasal spray 100 mcg  2 Spray DAILY Reyes Kaye M.D.       • [Held by provider] isosorbide dinitrate (ISORDIL) tablet 30 mg  30 mg BID Reyes Kaye M.D.   30 mg at 06/10/21 1810   • levothyroxine (SYNTHROID) tablet 150 mcg  150 mcg QAM Reyes Kaye M.D.   150 mcg at 06/15/21 0623   • prasugrel (EFFIENT) tablet 10 mg  10 mg DAILY Reyes Kaye M.D.   10 mg at 06/15/21 0623   • ranolazine (RANEXA) 500 MG SR tablet TB12 500 mg  500 mg BID Reyes Kaye M.D.   500 mg at 06/14/21 1903   • vitamin D (cholecalciferol) tablet 2,000 Units  2,000 Units DAILY Reyes Kaye M.D.   2,000 Units at 06/15/21 0623   • ALPRAZolam (XANAX) tablet 0.25 mg  0.25 mg HS PRN Scott Ghosh M.D.   0.25 mg at 06/15/21 0043   Last reviewed on 6/9/2021  4:03 AM by Leanne Ordaz R.N.    Allergies   Allergen Reactions   • Amoxicillin-Pot Clavulanate Unspecified     body cramps  Body cramps   • Augmentin Unspecified     Body cramps   • Codeine Unspecified     HA  Tolerated Morphine 09/2020   • Promethazine Unspecified     muscle spasms       Physical Exam  Body mass index is 16.51 kg/m². /51   Pulse 84   Temp 36 °C (96.8 °F) (Temporal)   Resp 17   Ht 1.778 m (5' 10\")   Wt 52.2 kg (115 lb 1.3 oz)   SpO2 99%    Vitals:    06/15/21 0354 06/15/21 0724 06/15/21 0900 06/15/21 1109   BP: 105/48 112/59  105/51   Pulse: 81 86  84   Resp: 19 17  17   Temp: 36.1 °C (97 °F) 36.4 °C (97.5 °F)  36 °C (96.8 °F)   TempSrc: Temporal Temporal  Temporal   SpO2: 90% 100%  99%   Weight:   52.2 kg (115 lb 1.3 oz)    Height:        Oxygen Therapy:  Pulse Oximetry: 99 %, O2 (LPM): 3, O2 Delivery Device: Silicone Nasal Cannula    General: no apparent distress, cachetic.  Neck: no JVD   Chest:  R subclavian " central line catheter in place.  Lungs: normal effort,  With bibasilar crackles, no wheezing or rhonchi  Heart: normal rate, regular rhythm, + 2/6 systolic murmur at LSB, no rub  EXT: no lower extremity edema  Neurological: No focal deficits, no facial asymmetry.  Normal speech  Psychiatric: Appropriate affect, alert and oriented x 3  Skin: Warm extremities, no rash    Labs (personally reviewed):     Lab Results   Component Value Date/Time    SODIUM 135 06/15/2021 02:48 AM    POTASSIUM 4.4 06/15/2021 02:48 AM    CHLORIDE 98 06/15/2021 02:48 AM    CO2 22 06/15/2021 02:48 AM    GLUCOSE 87 06/15/2021 02:48 AM    BUN 28 (H) 06/15/2021 02:48 AM    CREATININE 4.79 (H) 06/15/2021 02:48 AM    BUNCREATRAT 17 2016 07:11 AM     Lab Results   Component Value Date/Time    ALKPHOSPHAT 518 (H) 06/15/2021 02:48 AM    ASTSGOT 924 (H) 06/15/2021 02:48 AM    ALTSGPT 1554 (H) 06/15/2021 02:48 AM    TBILIRUBIN 1.0 06/15/2021 02:48 AM      Lab Results   Component Value Date/Time    CHOLSTRLTOT 83 (L) 2021 07:50 AM    LDL 30 2021 07:50 AM    HDL 36 (A) 2021 07:50 AM    TRIGLYCERIDE 83 2021 07:50 AM         Cardiac Imaging and Procedures Review:      Personal Telemetry Review:  SR in the 80s    Echo 2021:  CONCLUSIONS  No prior study is available for comparison.   Severely reduced left ventricular systolic function.  Left ventricular ejection fraction is visually estimated to be 30%.  Severe mitral regurgitation.  Moderate aortic insufficiency.  Mild tricuspid regurgitation.  Estimated right ventricular systolic pressure is 45 mmHg.    [facility] 2021 outside hosp:  Left main stent     [facility] 2021 outside hosp:  PTCA of the mid RCA, unable to deploy stent    [facility] 21 [facility]:  Acute instent thrombosis of the RCA stent with STEMI.      Assessment and Medical Decision Makin   Elevated troponin and BNP  -   Troponin continues to trend up a little, had episode of chest pain overnight, relieved  with morphine.  -   LBBB, but not new - appears to be present since 5/2/2021.  I cannot see the EKG from [facility] from that date, but his QRSd is reported as 150.  -   Dr. Castro to discuss case with Dr. Chirinos, patient's primary cardiologist.  -   Essentially, conservative medical therapy for now.  -   Continue Effient, warfarin, no ASA.    -   Continue Ranexa 500 BID.   -   Was getting isordil, but appears to be held (due to hypotension?)    2   CAD  -   Recent inferior STEMI 1/2021 s/p RCA PCI at [facility]  -   LM stent and PTCA to the RCA at [facility] 4/30-5/1/2021  -   Continue Effient and Ranexa.  -   Currently not on BB, likely due to hypotension (esepcially yesterday BP were in the 90s systolic)  May resume if BP will tolerate.  -   Can also resume is isosorbide at some point if he will tolerate.    3   LBBB since 5/2/2021  -    See EKG from [facility] in care everywhere.    4   Ischemic CMO EF 30% with acute on chronic systolic HF, difficult to determine NYHA classification given his limited ability for activity.  -    Volume mgmt via HD  -    Had been on GDMT with coreg 6.25 at home, was not on ACEI/ARB/ARNI due to his renal failure, but may be a candidate at this time if ok with nephro and his BP will tolerate.    5  Warfarin therapy  -    INR 3.48 today.    6  Chronic anemia  -   Likely from chronic diseae  -   H/H stable, hgb in the 9-10 range.    7  Thrombocytopenia.  -   Stable, plt 130     8  ESRD transition from PD to HD.  -   via R subclav central line  -   BUN, Cr improved today    9   PAD   -   S/p L BKA  -   S/p aortobifem bypass  -   On chronic warfarin    10  Transaminitits  -    Bilirubin wnl  -    Gallbladder workup not impressive.   -    Hepatitis panel negative.        Ruby Buck PA-C  [facility]

## 2021-06-15 NOTE — DISCHARGE PLANNING
Care Transition Team Assessment    Information Source  Orientation Level: Oriented X4  Information Given By: Patient     This RN Case Manager spoke with the patient at bedside and obtained the information used in this assessment. Patient verified accuracy of facesheet. Patient lives with his wife in a [place], [place]. Patient uses the [facility] pharmacy on [address] in [place] for short term medications. Prior to current hospitalization, patient was completely independent with ADLS/IADLS. Patient drives his personal vehicle to and from his doctors appointments or has his wife drive him. Patient is retired. Patient's support system include his spouse. Patient has an advanced directive at home on his computer that he has been working on prior to admission. Patient's discharge plan is home with [place] and continuing his dialysis regimen in [place] and [place].     Elopement Risk  Legal Hold: No  Ambulatory or Self Mobile in Wheelchair: No-Not an Elopement Risk  Disoriented: No  Psychiatric Symptoms: None  History of Wandering: No  Elopement this Admit: No  Vocalizing Wanting to Leave: No  Displays Behaviors, Body Language Wanting to Leave: No-Not at Risk for Elopement  Elopement Risk: Not at Risk for Elopement    Interdisciplinary Discharge Planning  Primary Care Physician: Kali Laing MD  Lives with - Patient's Self Care Capacity: Spouse  Patient or legal guardian wants to designate a caregiver: No  Housing / Facility: 1 Story House  Do You Take your Prescribed Medications Regularly: Yes  Able to Return to Previous ADL's: Yes  Mobility Issues: Yes  Prior Services: None  Patient Prefers to be Discharged to: home  Durable Medical Equipment: Home Oxygen  DME Provider / Phone: Can't remember    Discharge Preparedness  What is your plan after discharge?: Home with help  What are your discharge supports?: Spouse  Prior Functional Level: Ambulatory, Independent with Activities of Daily Living,  Independent with Medication Management, Uses Walker    Functional Assesment  Prior Functional Level: Ambulatory, Independent with Activities of Daily Living, Independent with Medication Management, Uses Walker    Finances  Financial Barriers to Discharge: No  Prescription Coverage: Yes    Vision / Hearing Impairment  Right Eye Vision: Impaired, Wears Glasses  Left Eye Vision: Impaired, Wears Glasses    Advance Directive  Advance Directive?:  (at home; currently working on one)    Domestic Abuse  Have you ever been the victim of abuse or violence?: No  Physical Abuse or Sexual Abuse: No  Verbal Abuse or Emotional Abuse: No  Possible Abuse/Neglect Reported to: Not Applicable     Discharge Risks or Barriers  Discharge risks or barriers?: Complex medical needs    Anticipated Discharge Information  Discharge Disposition: Still a Patient (30)  Discharge Address: [address] [place] [id number]  Discharge Contact Phone Number: [phone]

## 2021-06-15 NOTE — PROGRESS NOTES
Inpatient Anticoagulation Service Note    Date: 6/15/2021  Reason for Anticoagulation: Other (Comments) (arterial thrombosis)   [id number] VASc Score: 2  HAS-BLED Score: 4    Hemoglobin Value: (!) 11  Hematocrit Value: (!) 34.5  Lab Platelet Value: (!) 127  Target INR: 2.0 to 3.0    INR from last 7 days     Date/Time INR Value    06/15/21 02:48:01  (!) 3.48    21 02:41:01  (!) 2.53    21 09:25:01  (!) 2.84    21 00:59:01  (!) 5.31    21 0634  (!) 2.6    06/10/21 04:42:01  (!) 3.23    21 0702  (!) 2.98        Dose from last 7 days     Date/Time Dose (mg)    06/15/21 1306  0    21 1333  1    21 0800  0.5    21 0900  0    21 1702  --    21 1621  1    06/10/21 1249  0    21 1324  1.25        Average Dose (mg): 0.5  Significant Interactions: Antiplatelet Medications, Aspirin, Corticosteroids, Thyroid Medications  Bridge Therapy: No   INR Value Greater than 2 Prior to Discontinuation of Parenteral Anticoagulation: Not Applicable    Reversal Agent Administered: Not Applicable  Comments: Patient continued on warfarin for arterial thrombosis. INR is supratherapeutic today. H/H still low but increased from yesterday. Platelets low but stable. Black, tarry stool on  and , no other signs of bleeding. DDI as noted above. Poor diet. Previous warfarin doses recieved. INR goal 2-3.    Plan:  Hold warfarin. 0 mg today. Obtain INR on 16 AM to determine next dose.    Education Material Provided?: No (home medication)  Pharmacist suggested discharge dosin-1 mg warfarin PO daily. Follow up with anticoagulation clinic post discharge     Nell Burger, Pharmacy Intern

## 2021-06-15 NOTE — PROGRESS NOTES
[facility] -  PROGRESS NOTE               Author: Arsalan Lawson M.D.   Date & Time: 6/15/2021  10:16 AM     HPI:  67 yo M PMH ESRD CCPD, HTN, anemia of CKD, CKD-MBD, HLD, and CAD who presents to ED with CC as above.  He has chronic intermittent N/V that he's been battling for a long time.  It leads to wide fluctuations in his po intake.  He also has severe CAD and non-cardiac atherosclerosis.  Recently he feel she has lost 45 lbs in the past month due to the N/V.  He's had a CTA in past that has revealed SMA steosis, but report was not available here at this institution.  Repeat imaging ordered by admit MD and he was admitted for further evaluation.  Has no abd pain and otherwise denies any associated F/C/CP/SOB.  No melena, hematochezia, hematemesis.  No HA, visual changes.    DAILY NEPHROLOGY SUMMARY:  6/05 - Consult done  6/06 - NAEO, +N/V; Unable to keep majority of food down  6/07 - sitting up in bed, still having poor intake vomited this am, PD UF: 461ml, had UGI: WNL, had large BM this am   6/08: sitting up in bed, feels miserable, weak and nauseated, for EGD today, discussed case with Dr. Rivas, dont believe PD is contributing factor as pt BUN is only mildly elevated and pt has been on PD for > 1 year, if GI workup neg could consider transitioning to HD to see if helps elevate symptoms, tolerated PD with UF: 450ml  6/09: Ambulating in room. C/o continued N/V and dehydration. S/p EGD w/bx's of stomach/duodenum.  318 mL net UF. GES today.  6/10: Nauseous with small amount of emesis this am. Does not feel he can tolerate food with GES this am. Primary RN at . 473 Net UF CCPD.   6/11: Unable to complete GES due to nausea. Continues to have nausea.   6/12; again unable to complete GES due to nausea; had a permacath placed but hemodialysis plan was held as became hypotensive and rapid response was called, received IV fluid boluses for resuscitation, cardiology was consulted, seen on  "hemodialysis this a.m., blood pressure better, is very weak  6/13: Had first hemodialysis treatment yesterday, tolerated well, nausea slightly better still feels very weak and has poor appetite  6/14: No new overnight renal events. HD tolerated well with net UF of 1.0 L  6/15: No new overnight renal events. S/p HD yesterday and tolerated well.    REVIEW OF SYSTEMS:    10 point ROS reviewed and is as per HPI/daily summary or otherwise negative    PMH/PSH/SH/FH: Reviewed and unchanged since admission note  CURRENT MEDICATIONS: Reviewed from admission to present day    VS:  /59   Pulse 86   Temp 36.4 °C (97.5 °F) (Temporal)   Resp 17   Ht 1.778 m (5' 10\")   Wt 52.2 kg (115 lb 1.3 oz)   SpO2 100%   BMI 16.51 kg/m²   Physical Exam  Nursing note reviewed.   Constitutional:       General: He is not in acute distress.     Appearance: Normal appearance. He is ill-appearing. He is not diaphoretic.      Comments: Cachectic    HENT:      Head: Normocephalic and atraumatic.   Neck:      Trachea: No tracheal tenderness.   Cardiovascular:      Rate and Rhythm: Normal rate.      Heart sounds: Murmur heard.   No friction rub. No gallop.       Comments: No edema  Pulmonary:      Effort: Pulmonary effort is normal. No respiratory distress.   Abdominal:      General: There is no distension.      Comments: PD Cath in place   Musculoskeletal:         General: No deformity.      Comments: L BKA w/prosthesis   Skin:     General: Skin is warm and dry.      Coloration: Skin is not jaundiced.      Findings: No rash.   Neurological:      Mental Status: He is alert and oriented to person, place, and time.      Motor: No atrophy.   Psychiatric:         Behavior: Behavior normal.     Fluids:  In: 860 [P.O.:360; Dialysis:500]  Out: 1500     LABS:  Recent Labs     06/13/21  0925 06/14/21  0241 06/15/21  0248   SODIUM 134* 135 135   POTASSIUM 4.9 4.6 4.4   CHLORIDE 97 98 98   CO2 20 23 22   GLUCOSE 44* 69 87   BUN 58* 36* 28* "   CREATININE 10.30* 6.73* 4.79*   CALCIUM 8.4* 8.4* 8.4*       IMPRESSION:  # ESRD   - Etiology likely 2/2 HTN   -was on CCPD now switched to HD this admission  #Cachexia, unexplained 40 pound weight loss, intractable nausea vomiting - Followed by GI and has had extensive work up, upper GI: WNL , EGD 6/8    -Bx stomach and duodenum done - Path pending    -EGD essentially normal; no evidence of stricture, no erosive gastritis   -Unable to complete gastric emptying study x3   -On IV reglan and IV erythromycin x3 doses  # HTN now hypotensive   -hold blood pressure medicines   #cardiomyopathy, chronic systolic heart failure, severe CAD, severe MR   -EF of 30% and severe MR.     -Needs cath pe cardiology once INR is under 1.5.  # Anemia of CKD   - Goal Hgb 10-11   - Stable  # CKD-MBD   - managed at outpt clinic    - hold phos binder for now with N/V  # Hypokalemia, improved    -Was on PD so hypokalemia expected   # Hyponatremia, mild   -Better, monitoring  #Coagulopathy on warfarin     PLAN:  -Switched from CCPD to hemodialysis to address uremia and see if this improves his GI symptoms -severe nausea and vomiting  -S/P tunneled cath placement 6/11/21; HD #3 yesterday 6/14. No plans for HD today. Will be MWF  -No more CCPD from here on  -No need for p.o. KCl since not on PD anymore   -no dietary protein restrictions; Regular diet ok  -Dose all meds per ESRD  -No fluid restrictions at this time  -Nepro with meals  -hold phos binders since phosphorus very low  -Okay to bring Liquacel Protein supplement from home       thank you and will follow with you

## 2021-06-15 NOTE — CARE PLAN
The patient is Watcher - Medium risk of patient condition declining or worsening    Shift Goals  Clinical Goals: increase dietary intake   Patient Goals: rest  Family Goals: na    Progress made toward(s) clinical / shift goals:    Problem: Knowledge Deficit - Standard  Goal: Patient and family/care givers will demonstrate understanding of plan of care, disease process/condition, diagnostic tests and medications  Outcome: Progressing     Problem: Fluid Volume  Goal: Fluid volume balance will be maintained  Outcome: Progressing     Problem: Respiratory  Goal: Patient will achieve/maintain optimum respiratory ventilation and gas exchange  Outcome: Progressing     Problem: Nutrition  Goal: Patient's nutritional and fluid intake will be adequate or improve  Outcome: Progressing  Goal: Enteral nutrition will be maintained or improve  Outcome: Progressing       Patient is not progressing towards the following goals:

## 2021-06-15 NOTE — DIETARY
Nutrition Services: Update   Day 11 of admit.  Francis Purvis is a 66 y.o. male with admitting DX of vomitting, C diff colitis.    Pt is currently on regular diet. PO generally <25-50% since last RD follow up. Spoke with patient at bedside. Patient reports appetite is improving and nausea reduced. Patient reports he did well with pizza at lunch today. Patient is still consuming 30 mL Liquacel per day to provide an additional 100 kcal, 16 g protein. Patient denied any additional snacks/supplements; however, per RN patient has been enjoying chocolate pudding - added as HS snack as current phos labs WNL; however, will monitor for need to adjust snacks.    Wt 52.5 kg via stand up scale - 6/15 is stable with admission.     Malnutrition Risk: Severe per RD note 6/5.    Recommendations/Plan:  1. Continue liquacel supplement.  2. Add pudding at HS snack - monitor labs, need to adjust snacks.   3. Encourage intake of meals, supplements, snacks.   4. Document intake of all meals as % taken in ADL's to provide interdisciplinary communication across all shifts.   5. Monitor weight.  6. Nutrition rep will continue to see patient for ongoing meal and snack preferences.    RD following

## 2021-06-16 LAB
EKG IMPRESSION: NORMAL
INR PPP: 2.83 (ref 0.87–1.13)
PROTHROMBIN TIME: 28.9 SEC (ref 12–14.6)

## 2021-06-16 PROCEDURE — 99233 SBSQ HOSP IP/OBS HIGH 50: CPT | Performed by: INTERNAL MEDICINE

## 2021-06-16 PROCEDURE — A9270 NON-COVERED ITEM OR SERVICE: HCPCS | Performed by: INTERNAL MEDICINE

## 2021-06-16 PROCEDURE — 700102 HCHG RX REV CODE 250 W/ 637 OVERRIDE(OP): Performed by: NURSE PRACTITIONER

## 2021-06-16 PROCEDURE — 700111 HCHG RX REV CODE 636 W/ 250 OVERRIDE (IP)

## 2021-06-16 PROCEDURE — 700102 HCHG RX REV CODE 250 W/ 637 OVERRIDE(OP): Performed by: STUDENT IN AN ORGANIZED HEALTH CARE EDUCATION/TRAINING PROGRAM

## 2021-06-16 PROCEDURE — 85610 PROTHROMBIN TIME: CPT

## 2021-06-16 PROCEDURE — 5A1D70Z PERFORMANCE OF URINARY FILTRATION, INTERMITTENT, LESS THAN 6 HOURS PER DAY: ICD-10-PCS | Performed by: INTERNAL MEDICINE

## 2021-06-16 PROCEDURE — A9270 NON-COVERED ITEM OR SERVICE: HCPCS | Performed by: FAMILY MEDICINE

## 2021-06-16 PROCEDURE — 36415 COLL VENOUS BLD VENIPUNCTURE: CPT

## 2021-06-16 PROCEDURE — 99232 SBSQ HOSP IP/OBS MODERATE 35: CPT | Performed by: INTERNAL MEDICINE

## 2021-06-16 PROCEDURE — 700102 HCHG RX REV CODE 250 W/ 637 OVERRIDE(OP): Performed by: INTERNAL MEDICINE

## 2021-06-16 PROCEDURE — A9270 NON-COVERED ITEM OR SERVICE: HCPCS | Performed by: STUDENT IN AN ORGANIZED HEALTH CARE EDUCATION/TRAINING PROGRAM

## 2021-06-16 PROCEDURE — 700102 HCHG RX REV CODE 250 W/ 637 OVERRIDE(OP): Performed by: FAMILY MEDICINE

## 2021-06-16 PROCEDURE — 700111 HCHG RX REV CODE 636 W/ 250 OVERRIDE (IP): Performed by: FAMILY MEDICINE

## 2021-06-16 PROCEDURE — 93005 ELECTROCARDIOGRAM TRACING: CPT | Performed by: INTERNAL MEDICINE

## 2021-06-16 PROCEDURE — 700101 HCHG RX REV CODE 250: Performed by: FAMILY MEDICINE

## 2021-06-16 PROCEDURE — A9270 NON-COVERED ITEM OR SERVICE: HCPCS | Performed by: NURSE PRACTITIONER

## 2021-06-16 PROCEDURE — 700102 HCHG RX REV CODE 250 W/ 637 OVERRIDE(OP): Performed by: PHYSICIAN ASSISTANT

## 2021-06-16 PROCEDURE — 92526 ORAL FUNCTION THERAPY: CPT

## 2021-06-16 PROCEDURE — A9270 NON-COVERED ITEM OR SERVICE: HCPCS | Performed by: PHYSICIAN ASSISTANT

## 2021-06-16 PROCEDURE — 93010 ELECTROCARDIOGRAM REPORT: CPT | Performed by: INTERNAL MEDICINE

## 2021-06-16 PROCEDURE — 770020 HCHG ROOM/CARE - TELE (206)

## 2021-06-16 PROCEDURE — 90935 HEMODIALYSIS ONE EVALUATION: CPT

## 2021-06-16 RX ORDER — HEPARIN SODIUM 1000 [USP'U]/ML
INJECTION, SOLUTION INTRAVENOUS; SUBCUTANEOUS
Status: COMPLETED
Start: 2021-06-16 | End: 2021-06-16

## 2021-06-16 RX ORDER — WARFARIN SODIUM 1 MG/1
0.5 TABLET ORAL DAILY
Status: DISCONTINUED | OUTPATIENT
Start: 2021-06-16 | End: 2021-06-17 | Stop reason: HOSPADM

## 2021-06-16 RX ORDER — CALCIUM CARBONATE 500 MG/1
500 TABLET, CHEWABLE ORAL 3 TIMES DAILY PRN
Status: DISCONTINUED | OUTPATIENT
Start: 2021-06-16 | End: 2021-06-17 | Stop reason: HOSPADM

## 2021-06-16 RX ADMIN — ISOSORBIDE DINITRATE 30 MG: 30 TABLET ORAL at 17:42

## 2021-06-16 RX ADMIN — Medication 2000 UNITS: at 05:39

## 2021-06-16 RX ADMIN — LEVOTHYROXINE SODIUM 150 MCG: 0.15 TABLET ORAL at 05:39

## 2021-06-16 RX ADMIN — HEPARIN SODIUM 2000 UNITS: 1000 INJECTION, SOLUTION INTRAVENOUS; SUBCUTANEOUS at 11:47

## 2021-06-16 RX ADMIN — OXYCODONE 5 MG: 5 TABLET ORAL at 21:03

## 2021-06-16 RX ADMIN — POLYETHYLENE GLYCOL 3350 1 PACKET: 17 POWDER, FOR SOLUTION ORAL at 05:40

## 2021-06-16 RX ADMIN — NITROGLYCERIN 0.5 INCH: 20 OINTMENT TOPICAL at 08:22

## 2021-06-16 RX ADMIN — PRASUGREL 10 MG: 10 TABLET, FILM COATED ORAL at 05:39

## 2021-06-16 RX ADMIN — MORPHINE SULFATE 1 MG: 4 INJECTION INTRAVENOUS at 18:10

## 2021-06-16 RX ADMIN — HEPARIN SODIUM 3700 UNITS: 1000 INJECTION, SOLUTION INTRAVENOUS; SUBCUTANEOUS at 14:54

## 2021-06-16 RX ADMIN — RANOLAZINE 500 MG: 500 TABLET, FILM COATED, EXTENDED RELEASE ORAL at 21:00

## 2021-06-16 RX ADMIN — CYANOCOBALAMIN TAB 500 MCG 500 MCG: 500 TAB at 05:39

## 2021-06-16 RX ADMIN — GABAPENTIN 100 MG: 100 CAPSULE ORAL at 05:39

## 2021-06-16 RX ADMIN — DOCUSATE SODIUM 50 MG AND SENNOSIDES 8.6 MG 1 TABLET: 8.6; 5 TABLET, FILM COATED ORAL at 05:39

## 2021-06-16 RX ADMIN — NITROGLYCERIN 0.5 INCH: 20 OINTMENT TOPICAL at 18:00

## 2021-06-16 RX ADMIN — OXYCODONE HYDROCHLORIDE AND ACETAMINOPHEN 500 MG: 500 TABLET ORAL at 05:39

## 2021-06-16 RX ADMIN — NITROGLYCERIN 0.5 INCH: 20 OINTMENT TOPICAL at 23:37

## 2021-06-16 RX ADMIN — LIDOCAINE 2 PATCH: 50 PATCH TOPICAL at 23:36

## 2021-06-16 RX ADMIN — CARVEDILOL 6.25 MG: 6.25 TABLET, FILM COATED ORAL at 17:41

## 2021-06-16 RX ADMIN — WARFARIN SODIUM 0.5 MG: 1 TABLET ORAL at 17:42

## 2021-06-16 RX ADMIN — OXYCODONE 5 MG: 5 TABLET ORAL at 10:42

## 2021-06-16 ASSESSMENT — ENCOUNTER SYMPTOMS
SHORTNESS OF BREATH: 0
WEAKNESS: 1
FOCAL WEAKNESS: 0
NERVOUS/ANXIOUS: 0
ABDOMINAL PAIN: 0
CHILLS: 0
PALPITATIONS: 0
WEIGHT LOSS: 1
HEARTBURN: 0
NAUSEA: 1
SENSORY CHANGE: 0
DEPRESSION: 0
FEVER: 0
MYALGIAS: 0

## 2021-06-16 ASSESSMENT — PAIN DESCRIPTION - PAIN TYPE
TYPE: ACUTE PAIN

## 2021-06-16 ASSESSMENT — FIBROSIS 4 INDEX: FIB4 SCORE: 12.37

## 2021-06-16 NOTE — THERAPY
Speech Language Pathology  Daily Treatment     Patient Name: Francis Purvis  Age:  66 y.o., Sex:  male  Medical Record #: [id number]  Today's Date: 6/16/2021     Precautions  Precautions: Fall Risk  Comments: old L BKA with prosthetic    Assessment  Patient seen this date for dysphagia tx session. Patient currently on regular diet w/ thin liquids and per RN, patient appears to be tolerating diet without difficulty. Patient reported intermittent difficulty w/ pills and was educated on asking for them floated whole in puree if needed. Patient also reported he intermittently consumes PO intake while lying down, but was educated on swallow precautions to consume PO intake at 90 degrees to minimize risk for aspiration. Patient verbalized understanding. Patient denied N/V. Patient consumed PO trials of soft solids, mixed consistencies, crackers, and thin liquids via cup sip and straw. Patient consumed all PO trials with no overt s/sx of aspiration. Vocal quality remained clear, mastication appear adequate, and initiation of swallow trigger was timely. Laryngeal elevation palpated as complete.     Recommend patient continue regular diet w/ thin liquids w/ use of swallow precautions (remind patient to elevate HOB to 90 degrees for all PO intake). Ok for meds whole w/ thin liquid wash or floated whole in puree if needed. Straws ok.     Plan  Discharge secondary to goals met.    Discharge Recommendations: SLP treatment completed.  Patient is not being actively followed for therapy services at this time, however may be seen if requested by attending provider for 1 more visit within 30 days to address any discharge needs or if the patient has a change in status.       Objective     06/16/21 1058   Precautions   Precautions Fall Risk   Vitals   O2 (LPM) 2.5   O2 Delivery Device Silicone Nasal Cannula   Dysphagia    Positioning / Behavior Modification Self Monitoring;Modulate Rate or Bite Size   Diet / Liquid Recommendation  Regular (7);Thin (0)   Nutritional Liquid Intake Rating Scale Non thickened beverages   Nutritional Food Intake Rating Scale Total oral diet with no restrictions   Recommended Route of Medication Administration   Medication Administration  Whole with Liquid Wash;Float Whole with Puree  (As tolerated )   Short Term Goals   Short Term Goal # 1 Pt will be able to consume reg/thin liquids with no overt S/Sx of aspiration when independently implementing safe swallow precautions.    Goal Outcome # 1 Goal met   Short Term Goal # 2 Pt will be able to verbalize understanding of current swallowing status and aspiration S/Sx with 100% accuracy following education from SLP   Goal Outcome # 2  Goal met   Anticipated Discharge Needs   Discharge Recommendations Anticipate that the patient will have no further speech therapy needs after discharge from the hospital

## 2021-06-16 NOTE — PALLIATIVE CARE
Palliative Care follow-up  PC RN visited BS but pt off the floor at this time. Will continue to follow and assist with POC as needed.    Plan: follow and support as needed    Thank you for allowing Palliative Care to support this patient and family. Contact [phone] for additional assistance, change in patient status, or with any questions/concerns.

## 2021-06-16 NOTE — PROGRESS NOTES
[facility] -  PROGRESS NOTE               Author: Arsalan Lawson M.D.   Date & Time: 6/16/2021  12:00 PM     HPI:  65 yo M PMH ESRD CCPD, HTN, anemia of CKD, CKD-MBD, HLD, and CAD who presents to ED with CC as above.  He has chronic intermittent N/V that he's been battling for a long time.  It leads to wide fluctuations in his po intake.  He also has severe CAD and non-cardiac atherosclerosis.  Recently he feel she has lost 45 lbs in the past month due to the N/V.  He's had a CTA in past that has revealed SMA steosis, but report was not available here at this institution.  Repeat imaging ordered by admit MD and he was admitted for further evaluation.  Has no abd pain and otherwise denies any associated F/C/CP/SOB.  No melena, hematochezia, hematemesis.  No HA, visual changes.    DAILY NEPHROLOGY SUMMARY:  6/05 - Consult done  6/06 - NAEO, +N/V; Unable to keep majority of food down  6/07 - sitting up in bed, still having poor intake vomited this am, PD UF: 461ml, had UGI: WNL, had large BM this am   6/08: sitting up in bed, feels miserable, weak and nauseated, for EGD today, discussed case with Dr. Rivas, dont believe PD is contributing factor as pt BUN is only mildly elevated and pt has been on PD for > 1 year, if GI workup neg could consider transitioning to HD to see if helps elevate symptoms, tolerated PD with UF: 450ml  6/09: Ambulating in room. C/o continued N/V and dehydration. S/p EGD w/bx's of stomach/duodenum.  318 mL net UF. GES today.  6/10: Nauseous with small amount of emesis this am. Does not feel he can tolerate food with GES this am. Primary RN at . 473 Net UF CCPD.   6/11: Unable to complete GES due to nausea. Continues to have nausea.   6/12; again unable to complete GES due to nausea; had a permacath placed but hemodialysis plan was held as became hypotensive and rapid response was called, received IV fluid boluses for resuscitation, cardiology was consulted, seen on  "hemodialysis this a.m., blood pressure better, is very weak  6/13: Had first hemodialysis treatment yesterday, tolerated well, nausea slightly better still feels very weak and has poor appetite  6/14: No new overnight renal events. HD tolerated well with net UF of 1.0 L  6/15: No new overnight renal events. S/p HD yesterday and tolerated well.  6/16: No new overnight renal events. HD today.    REVIEW OF SYSTEMS:    10 point ROS reviewed and is as per HPI/daily summary or otherwise negative    PMH/PSH/SH/FH: Reviewed and unchanged since admission note  CURRENT MEDICATIONS: Reviewed from admission to present day    VS:  /64   Pulse 94   Temp 36.1 °C (97 °F) (Temporal)   Resp 18   Ht 1.778 m (5' 10\")   Wt 53.8 kg (118 lb 9.7 oz)   SpO2 100%   BMI 17.02 kg/m²   Physical Exam  Nursing note reviewed.   Constitutional:       Appearance: Normal appearance.      Comments: Cachectic    HENT:      Head: Normocephalic and atraumatic.   Neck:      Trachea: No tracheal tenderness.   Cardiovascular:      Rate and Rhythm: Normal rate.      Heart sounds: Murmur heard.   No friction rub. No gallop.       Comments: No edema  Pulmonary:      Effort: Pulmonary effort is normal. No respiratory distress.   Abdominal:      General: There is no distension.      Comments: PD Cath in place   Musculoskeletal:         General: No deformity.      Comments: L BKA w/prosthesis   Skin:     General: Skin is warm and dry.      Coloration: Skin is not jaundiced.      Findings: No rash.   Neurological:      Mental Status: He is alert and oriented to person, place, and time.      Motor: No atrophy.   Psychiatric:         Behavior: Behavior normal.     Fluids:  No intake/output data recorded.    LABS:  Recent Labs     06/14/21  0241 06/15/21  0248   SODIUM 135 135   POTASSIUM 4.6 4.4   CHLORIDE 98 98   CO2 23 22   GLUCOSE 69 87   BUN 36* 28*   CREATININE 6.73* 4.79*   CALCIUM 8.4* 8.4*       IMPRESSION:  # ESRD   - Etiology likely 2/2 " HTN   -was on CCPD now switched to HD this admission  #Cachexia, unexplained 40 pound weight loss, intractable nausea vomiting - Followed by GI and has had extensive work up, upper GI: WNL , EGD 6/8    -Bx stomach and duodenum done - Path pending    -EGD essentially normal; no evidence of stricture, no erosive gastritis   -Unable to complete gastric emptying study x3   -On IV reglan and IV erythromycin x3 doses  # HTN now hypotensive   -hold blood pressure medicines   #cardiomyopathy, chronic systolic heart failure, severe CAD, severe MR   -EF of 30% and severe MR.     -Needs cath pe cardiology once INR is under 1.5.  # Anemia of CKD   - Goal Hgb 10-11   - Stable  # CKD-MBD   - managed at outpt clinic    - hold phos binder for now with N/V  # Hypokalemia, improved    -Was on PD so hypokalemia expected   # Hyponatremia, mild   -Better, monitoring  #Coagulopathy on warfarin     PLAN:  -HD today  -Switched from CCPD to hemodialysis to address uremia and see if this improves his GI symptoms -severe nausea and vomiting  -S/P tunneled cath placement 6/11/21; Will be MWF.  -No more CCPD from here on  -No need for p.o. KCl since not on PD anymore   -no dietary protein restrictions; Regular diet ok  -Dose all meds per ESRD  -No fluid restrictions at this time  -Nepro with meals  -hold phos binders since phosphorus very low  -Okay to bring Liquacel Protein supplement from home       thank you and will follow with you

## 2021-06-16 NOTE — PROGRESS NOTES
[facility] Follow-up Note    Date of Service:    6/16/2021      Name:   Francis Purvis   YOB: 1955  Age:   66 y.o.  male   MRN:   [id number]      Chief Complaint: CHF, valvular disease    Primary Cardiologist:  [facility], Dr. Nick Chirinos    HPI:  Mr Purvis is a 65 y/o fellow with PMH including testicular CA s/p whole body radiation in the 1970s, hypothyroidism, Dyslipidemia, Essential hypertension, ESRD was on PD.    He has an extensive CV hx including mod-severe mitral regurgitation, CAD, ICMO with EF 35% after inferior STEMI in 2/2021 and PCI to the RCA then LM stent on 4/30/2021 followed by PTCA to the mRCA 5/1/21 - [facility] at [facility]  He developed LBBB after his procedure on 5/2/2021 EKG from [facility].  He has PAD s/p Left BKA, s/p aortobifem bypass, has porcelain aorta.  He is on chronic warfarin therpay for PAD.      He presents with intractable N/V with a 45 lb weight loss.  Cardiology was consulted to due to elevated BNP and troponin (1502, 2128).    Interim Events:  6/16/2021: SR 70-90s, LBBB  Pt denies CP, SOB, lower extremity edema      ROS  Constitutional:  No fatigue.  Weight loss.  Respiratory:  Denies shortness of breath, no cough.  Cardiovascular:  No chest pain.  no lower extremity edema.  Denies orthopnea or PND.  : denies polyuria, no dysuria.  GI:  Denies nausea/vomiting.  No abdominal distention.  Neuro:  Denies dizziness, syncope.  Hem/lymph: Denies easy bleeding/bruising.      All other review of systems reviewed and negative.    Past medical, surgical, social, and family history reviewed and unchanged from admission except as noted in HPI.    Medications: Reviewed in MAR  Current Facility-Administered Medications   Medication Dose Frequency Provider Last Rate Last Admin   • nitroglycerin (NITRO-BID) 2 % ointment 0.5 Inch  0.5 Inch Q6HRS Ruby Buck P.A.-C.   0.5 Inch at 06/16/21 0822   • MD Alert...Warfarin per Pharmacy   PHARMACY TO DOSE [place]  SHANNON Ball       • heparin injection 2,000 Units  2,000 Units DIALYSIS PRN Arsalan Lawson M.D.   2,000 Units at 06/14/21 1340   • heparin injection 3,700 Units  3,700 Units DIALYSIS PRN Cj Rivas M.D.   3,700 Units at 06/14/21 1647   • oxyCODONE immediate-release (ROXICODONE) tablet 5 mg  5 mg Q4HRS PRN Fito Ball M.D.   5 mg at 06/15/21 2017   • morphine (pf) 4 mg/mL injection 1-2 mg  1-2 mg Q4HRS PRN Fito Ball M.D.   2 mg at 06/15/21 1623   • gabapentin (NEURONTIN) capsule 100 mg  100 mg TID Fito Ball M.D.   100 mg at 06/16/21 0539   • lidocaine (LIDODERM) 5 % 2 Patch  2 Patch Q24HR Fito Ball M.D.   2 Patch at 06/15/21 0043   • dexamethasone (DECADRON) injection 4 mg  4 mg Q6HRS PRN Tejinder Benson M.D.       • LORazepam (ATIVAN) injection 0.5 mg  0.5 mg Q4HRS PRN Tejinder Benson M.D.   0.5 mg at 06/15/21 2017   • bisacodyl (DULCOLAX) suppository 10 mg  10 mg DAILY Demetrius Junior M.D.   10 mg at 06/07/21 0527   • senna-docusate (PERICOLACE or SENOKOT S) 8.6-50 MG per tablet 1 tablet  1 tablet BID Demetrius Junior M.D.   1 tablet at 06/16/21 0539   • polyethylene glycol/lytes (MIRALAX) PACKET 1 Packet  1 Packet DAILY Demetrius Junior M.D.   1 Packet at 06/16/21 0540   • lactated ringers infusion (BOLUS)  500 mL Once PRN Reyes Kaye M.D.       • enalaprilat (VASOTEC) injection 1.25 mg  1.25 mg Q6HRS PRN Reyes Chaung, M.D.       • labetalol (NORMODYNE/TRANDATE) injection 10 mg  10 mg Q4HRS PRN Reyes Kaye M.D.       • guaiFENesin dextromethorphan (ROBITUSSIN DM) 100-10 MG/5ML syrup 10 mL  10 mL Q6HRS PRN Reyes Kaye M.D.       • albuterol inhaler 1-2 Puff  1-2 Puff Q6HRS PRN Reyes Kaye M.D.       • ascorbic acid (Vitamin C) tablet 500 mg  500 mg DAILY Reyes Kaye M.D.   500 mg at 06/16/21 0539   • [Held by provider] carvedilol (COREG) tablet 6.25 mg  6.25 mg BID WITH MEALS Reyes Kaye M.D.   6.25 mg at 06/11/21 0810   • cyanocobalamin (VITAMIN B-12) tablet 500 mcg  " 500 mcg DAILY Reyes Kaye M.D.   500 mcg at 06/16/21 0539   • fluticasone (FLONASE) nasal spray 100 mcg  2 Spray DAILY Reyes Kaye M.D.       • [Held by provider] isosorbide dinitrate (ISORDIL) tablet 30 mg  30 mg BID Reyes Kaye M.D.   30 mg at 06/10/21 1810   • levothyroxine (SYNTHROID) tablet 150 mcg  150 mcg QAM Reyes Kaye M.D.   150 mcg at 06/16/21 0539   • prasugrel (EFFIENT) tablet 10 mg  10 mg DAILY Reyes Kaye M.D.   10 mg at 06/16/21 0539   • ranolazine (RANEXA) 500 MG SR tablet TB12 500 mg  500 mg BID Reyes Kaye M.D.   500 mg at 06/14/21 1903   • vitamin D (cholecalciferol) tablet 2,000 Units  2,000 Units DAILY Reyes Kaye M.D.   2,000 Units at 06/16/21 0539   • ALPRAZolam (XANAX) tablet 0.25 mg  0.25 mg HS PRN Scott Ghosh M.D.   0.25 mg at 06/15/21 1528   Last reviewed on 6/9/2021  4:03 AM by Leanne Ordaz R.N.    Allergies   Allergen Reactions   • Amoxicillin-Pot Clavulanate Unspecified     body cramps  Body cramps   • Augmentin Unspecified     Body cramps   • Codeine Unspecified     HA  Tolerated Morphine 09/2020   • Promethazine Unspecified     muscle spasms       Physical Exam  Body mass index is 17.02 kg/m². /61   Pulse 93   Temp 35.8 °C (96.5 °F) (Temporal)   Resp 18   Ht 1.778 m (5' 10\")   Wt 53.8 kg (118 lb 9.7 oz)   SpO2 96%    Vitals:    06/16/21 0200 06/16/21 0328 06/16/21 0712 06/16/21 0817   BP: 124/68 121/61 113/61    Pulse: 96 100 93    Resp:  18 18    Temp:  36.3 °C (97.4 °F) 35.8 °C (96.5 °F)    TempSrc:  Temporal Temporal    SpO2:  96% 96%    Weight:    53.8 kg (118 lb 9.7 oz)   Height:        Oxygen Therapy:  Pulse Oximetry: 96 %, O2 (LPM): 2.5, O2 Delivery Device: Silicone Nasal Cannula    General: no apparent distress, cachetic.  Neck: no JVD   Chest:  R subclavian central line catheter in place.  Lungs: normal effort,  With bibasilar crackles, no wheezing or rhonchi  Heart: normal rate, regular rhythm, + 2/6 systolic murmur at LSB, no rub  EXT: L " BKA  Neurological: No focal deficits, no facial asymmetry.  Normal speech  Psychiatric: Appropriate affect, alert and oriented x 3  Skin: Warm extremities, no rash    Labs (personally reviewed):     Lab Results   Component Value Date/Time    SODIUM 135 06/15/2021 02:48 AM    POTASSIUM 4.4 06/15/2021 02:48 AM    CHLORIDE 98 06/15/2021 02:48 AM    CO2 22 06/15/2021 02:48 AM    GLUCOSE 87 06/15/2021 02:48 AM    BUN 28 (H) 06/15/2021 02:48 AM    CREATININE 4.79 (H) 06/15/2021 02:48 AM    BUNCREATRAT 17 2016 07:11 AM     Lab Results   Component Value Date/Time    ALKPHOSPHAT 518 (H) 06/15/2021 02:48 AM    ASTSGOT 924 (H) 06/15/2021 02:48 AM    ALTSGPT 1554 (H) 06/15/2021 02:48 AM    TBILIRUBIN 1.0 06/15/2021 02:48 AM      Lab Results   Component Value Date/Time    CHOLSTRLTOT 83 (L) 2021 07:50 AM    LDL 30 2021 07:50 AM    HDL 36 (A) 2021 07:50 AM    TRIGLYCERIDE 83 2021 07:50 AM         Cardiac Imaging and Procedures Review:      Personal Telemetry Review:  SR in the 80s    Echo 2021:  CONCLUSIONS  No prior study is available for comparison.   Severely reduced left ventricular systolic function.  Left ventricular ejection fraction is visually estimated to be 30%.  Severe mitral regurgitation.  Moderate aortic insufficiency.  Mild tricuspid regurgitation.  Estimated right ventricular systolic pressure is 45 mmHg.    [facility] 2021 outside hosp:  Left main stent     [facility] 2021 outside hosp:  PTCA of the mid RCA, unable to deploy stent    [facility] 21 [facility]:  Acute instent thrombosis of the RCA stent with STEMI.      Assessment and Medical Decision Makin   Elevated troponin and BNP  -   Troponin continues to trend up a little, had episode of chest pain overnight, relieved with morphine.  -   LBBB, but not new - appears to be present since 2021.  I cannot see the EKG from [facility] from that date, but his QRSd is reported as 150.  -   Dr. Castro to discuss case with   Candis, patient's primary cardiologist.  -   Essentially, conservative medical therapy for now.  -   Continue Effient, warfarin, no ASA.    -   Continue Ranexa 500 BID  -   Changed parameters on Ranexa, Isordil, and Carvedilol    2   CAD  -   Recent inferior STEMI 1/2021 s/p RCA PCI at [facility]  -   LM stent and PTCA to the RCA at [facility] 4/30-5/1/2021  -   Continue Effient and Ranexa.  -   Currently not on BB, likely due to hypotension (esepcially yesterday BP were in the 90s systolic)  May resume if BP will tolerate.      3   LBBB since 5/2/2021  -    See EKG from [facility] in care everywhere.    4   Ischemic CMO EF 30% with acute on chronic systolic HF, difficult to determine NYHA classification given his limited ability for activity.  -    Volume mgmt via HD  -    Had been on GDMT with coreg 6.25 at home, was not on ACEI/ARB/ARNI due to his renal failure, but may be a candidate at this time if ok with nephro and his BP will tolerate.    5  Warfarin therapy  -    INR 3.48 today.    6  Chronic anemia  -   Likely from chronic diseae  -   H/H stable, hgb in the 9-10 range.    7  Thrombocytopenia.  -   Stable, plt 130     8  ESRD transition from PD to HD.  -   via R subclav central line  -   BUN, Cr improved today    9   PAD   -   S/p L BKA  -   S/p aortobifem bypass  -   On chronic warfarin    10  Transaminitits  -    Bilirubin wnl  -    Gallbladder workup not impressive.   -    Hepatitis panel negative.            SURESH Meehan  [facility]  [phone]    No future appointments.    Please note that this dictation was created using voice recognition software. I have worked with consultants from the vendor as well as technical experts from [facility] to optimize the interface. I have made every reasonable attempt to correct obvious errors, but I expect that there are errors of grammar and possibly content I did not discover before finalizing the note.

## 2021-06-16 NOTE — CODE DOCUMENTATION
Patient reports that he often feels lethargic following dialysis, has some blurry vision that usually resolves with rest. Denies chest pain. Missy INIGUEZ reports EKG does not show significant change and that patient is not a cath lab candidate. Patient requesting to rest.

## 2021-06-16 NOTE — CARE PLAN
The patient is Stable - Low risk of patient condition declining or worsening    Shift Goals  Clinical Goals: dialysis  Patient Goals: rest  Family Goals: na    Progress made toward(s) clinical / shift goals:    Problem: Hemodynamics  Goal: Patient's hemodynamics, fluid balance and neurologic status will be stable or improve  Outcome: Progressing     Problem: Respiratory  Goal: Patient will achieve/maintain optimum respiratory ventilation and gas exchange  Outcome: Progressing     Problem: Pain - Standard  Goal: Alleviation of pain or a reduction in pain to the patient’s comfort goal  Outcome: Progressing     Problem: Nutrition  Goal: Patient's nutritional and fluid intake will be adequate or improve  Outcome: Progressing       Patient is not progressing towards the following goals:

## 2021-06-16 NOTE — PROGRESS NOTES
3hr HD started @ 1150 and completed @ 1452,tx well tolerated,VSS,net UF = 1000ml as ordered.RILEAH TDC dressing CDI,report given to CESAR Morrison RN.

## 2021-06-16 NOTE — PROGRESS NOTES
Bedside report received from night RN; assumed pt care. Pt assessment complete. Pt Aox4. Reviewed plan of care with pt. Tele box on and rhythm verified. Chart and labs reviewed. Bed in lowest position, and 2 side rails up. Pt educated on call light; call light with in reach.

## 2021-06-16 NOTE — PROGRESS NOTES
Inpatient Anticoagulation Service Note    Date: 2021  Reason for Anticoagulation: Other (Comments) (arterial thrombosis)   [id number] VASc Score: 2  HAS-BLED Score: 4    Hemoglobin Value: (!) 11  Hematocrit Value: (!) 34.5  Lab Platelet Value: (!) 127  Target INR: 2.0 to 3.0    INR from last 7 days     Date/Time INR Value    21 0156  (!) 2.83    06/15/21 02:48:01  (!) 3.48    21 02:41:01  (!) 2.53    21 09:25:01  (!) 2.84    21 00:59:01  (!) 5.31    21 0634  (!) 2.6    06/10/21 04:42:01  (!) 3.23        Dose from last 7 days     Date/Time Dose (mg)    21 1122  0.5    06/15/21 1306  0    21 1333  1    21 0800  0.5    21 0900  0    21 1702  --    21 1621  1    06/10/21 1249  0    21 1324  1.25        Average Dose (mg): 0.5  Significant Interactions: Thyroid Medications, Antiplatelet Medications, Corticosteroids, Aspirin  Bridge Therapy: No  INR Value Greater than 2 Prior to Discontinuation of Parenteral Anticoagulation: Not Applicable   Reversal Agent Administered: Not Applicable  Comments: Patient continuing warfarin for arterial thrombosis. INR is therapeutic. H/H is stable with no signs of bleeding. DDI as noted above. Previous doses have been received or held as appropriate. Diet has improved from yesterday. INR goal is 2-3.    Plan:  Warfarin 0.5 mg tonight. Check INR in AM ().  Education Material Provided?: No (home medication)  Pharmacist suggested discharge dosin-1 mg warfarin PO daily. Follow up with anticoagulation clinic post discharge.     Nell Burger, Pharmacy Intern

## 2021-06-16 NOTE — PROGRESS NOTES
Hospital Medicine Daily Progress Note    Date of Service  6/16/2021    Chief Complaint  66 y.o. male presented 6/4/2021 with nausea and vomiting    Hospital Course  Admitted with nausea and vomiting, weight loss.  He had a CT abdomen and pelvis done in an outside facility which showed possible SMA stenosis.  CT scan of the abdomen pelvis here was noted to be negative.  Gastroenterology was consulted case, EGD was done which showed gastric polyps, nonerosive gastritis.  Patient was unable to do a gastric emptying scan.  Patient also with known history of end-stage renal disease on peritoneal dialysis.  It was felt that this may be contributing to his nausea and vomiting.  A right internal jugular hemodialysis catheter was placed on 6/11/2021, and hemodialysis has been initiated.  Also apparently developed chest pain, his troponin was markedly elevated at 1500, cardiology has been consulted on the case.  Known history of CAD with stents, and chronic systolic CHF.    Interval Problem Update  Nausea and vomiting - resolved  Feels better    Chest pain - none currently  HTN - sbp   CHF - EF 30%    Updates given and plan of care discussed with patient's spouse.    Consultants/Specialty  Nephrology  Gastroenterology  Cardiology    Code Status  Full Code    Disposition  TBD    Review of Systems  Review of Systems   Constitutional: Positive for malaise/fatigue and weight loss. Negative for chills and fever.   HENT: Negative for hearing loss and nosebleeds.    Respiratory: Negative for shortness of breath.    Cardiovascular: Negative for palpitations and leg swelling.   Gastrointestinal: Positive for nausea. Negative for abdominal pain and heartburn.   Genitourinary: Negative for dysuria and hematuria.   Musculoskeletal: Negative for joint pain and myalgias.   Skin: Negative for rash.   Neurological: Positive for weakness. Negative for sensory change and focal weakness.   Psychiatric/Behavioral: Negative for depression.  The patient is not nervous/anxious.         Physical Exam  Temp:  [35.8 °C (96.5 °F)-36.3 °C (97.4 °F)] 36.1 °C (97 °F)  Pulse:  [] 94  Resp:  [18] 18  BP: (100-130)/(54-73) 130/64  SpO2:  [96 %-100 %] 100 %    Physical Exam  Vitals and nursing note reviewed.   Constitutional:       General: He is not in acute distress.     Appearance: He is ill-appearing. He is not toxic-appearing or diaphoretic.      Comments: Cachetic   HENT:      Head: Normocephalic and atraumatic.      Mouth/Throat:      Mouth: Mucous membranes are moist.   Eyes:      Extraocular Movements: Extraocular movements intact.      Conjunctiva/sclera: Conjunctivae normal.      Pupils: Pupils are equal, round, and reactive to light.   Cardiovascular:      Rate and Rhythm: Normal rate and regular rhythm.      Heart sounds: Murmur heard.     Pulmonary:      Effort: Pulmonary effort is normal.      Breath sounds: Rales present.   Abdominal:      General: Bowel sounds are normal. There is no distension.      Palpations: Abdomen is soft. There is no mass.      Tenderness: There is no abdominal tenderness.      Hernia: No hernia is present.      Comments: PD catheter    Musculoskeletal:         General: No swelling or tenderness.      Comments: Left BKA    Skin:     General: Skin is warm and dry.   Neurological:      General: No focal deficit present.      Mental Status: He is alert and oriented to person, place, and time.      Cranial Nerves: No cranial nerve deficit.      Sensory: No sensory deficit.      Motor: Weakness present.   Psychiatric:         Mood and Affect: Mood is anxious.         Fluids  No intake or output data in the 24 hours ending 06/16/21 1318    Laboratory  Recent Labs     06/14/21  0241 06/15/21  0248   WBC 10.3 9.7   RBC 3.11* 3.27*   HEMOGLOBIN 10.1* 11.0*   HEMATOCRIT 33.1* 34.5*   .4* 105.5*   MCH 32.5 33.6*   MCHC 30.5* 31.9*   RDW 63.7* 64.1*   PLATELETCT 130* 127*   MPV 11.7 11.5     Recent Labs     06/14/21  0241  06/15/21  0248   SODIUM 135 135   POTASSIUM 4.6 4.4   CHLORIDE 98 98   CO2 23 22   GLUCOSE 69 87   BUN 36* 28*   CREATININE 6.73* 4.79*   CALCIUM 8.4* 8.4*     Recent Labs     06/14/21  0241 06/15/21  0248 06/16/21  0156   INR 2.53* 3.48* 2.83*               Imaging  CT-HEAD W/O   Final Result      1.  No acute intracranial abnormality      2.  Cerebral volume loss and white matter change      NM-BILIARY (HIDA) SCAN WITH CCK   Final Result         1. No evidence of acute cholecystitis.      2. Low gallbladder ejection fraction with no response to CCK. This could relate to biliary dyskinesia.         DX-CHEST-PORTABLE (1 VIEW)   Final Result      No acute cardiopulmonary abnormality.      IR-ROSALES,GROSHONG PLACEMENT >5   Final Result      1. ULTRASOUND AND FLUOROSCOPIC GUIDED PLACEMENT OF A Right INTERNAL JUGULAR 14.5 Wolof HemoSplit TUNNELED DIALYSIS CATHETER.      2. THE HEMODIALYSIS CATHETER MAY BE USED IMMEDIATELY AS CLINICALLY INDICATED. FLUSHES PER PROTOCOL.      3. FOCAL TORTUOSITY AND NARROWING AT THE DISTAL RIGHT INTERNAL JUGULAR VEIN-RIGHT BRACHIOCEPHALIC JUNCTION.      DX-UPPER GI-SERIES WITH KUB   Final Result      1.  Unremarkable upper GI exam.         EC-ECHOCARDIOGRAM COMPLETE W/O CONT   Final Result      CT-CHEST (THORAX) W/O   Final Result      1.  Abnormal lung parenchyma      2.  Specifically, there are multifocal bilateral airspace process, 14 mm right middle lobe groundglass density, posterior aspect right lower lobe 12 mm area of spiculation and probably underlying emphysema.      3.  Pulmonary findings are nonspecific and could be inflammation, infection, or postinflammatory scarring. Neoplasm is less likely.      4.  Dense aortic and branch vessel atherosclerotic plaque      5.  Pulmonary artery hypertension      6.  Very small bilateral pleural effusion      Fleischner Society pulmonary nodule recommendations:      Ground Glass and Part Solid: No routine follow-up      Comments: In  certain suspicious nodules less than 6 mm, consider follow-up at 2 and 4 years. If solid component(s) or growth develops, consider resection.      Note: These recommendations do not apply to lung cancer screening, patients with immunosuppression, or patients with known primary cancer.      Fleischner Society 2017 Guidelines for Management of Incidentally Detected Pulmonary Nodules in Adults      OUTSIDE IMAGES-US ABDOMEN   Final Result      OUTSIDE IMAGES-DX CHEST   Final Result      CT-ABDOMEN-PELVIS WITH   Final Result   Addendum 1 of 1   On further review, the SMA and celiac axis are noted to be patent.    Contrast is difficult to confirm within the diminutive and markedly    atherosclerotic renal arteries.      Final      1.  No acute abdominal or pelvic findings.   2.  Bilaterally small atrophic appearing kidneys consistent with renal failure, with a dialysis catheter noted in the pelvic cavity.   3.  Colonic diverticulosis without diverticulitis.           Assessment/Plan  * Intractable nausea and vomiting- (present on admission)  Assessment & Plan  EGD - Gastric polyps, Non-erosive gastritis. 6/8/2021  Off IV Erythromycin  stop IV Reglan       Elevated LFTs- (present on admission)  Assessment & Plan  Follow cmp  US - Mildly hydropic gallbladder. No sonographic signs of acute cholecystitis  HIDA scan - No evidence of acute cholecystitis. Low gallbladder ejection fraction with no response to CCK. This could relate to biliary dyskinesia.  Hepatic congestion, on HD  monitor    Dyskinesia of gallbladder- (present on admission)  Assessment & Plan  May need Surgical consult if with persistent Nausea and Vomiting    Thrombocytopenia (HCC)- (present on admission)  Assessment & Plan  Follow cbc    Anemia- (present on admission)  Assessment & Plan  H/h stable    Supratherapeutic INR  Assessment & Plan  Monitor for signs of bleeding    Hyponatremia- (present on admission)  Assessment & Plan  Follow bmp    Hypokalemia-  (present on admission)  Assessment & Plan  follow bmp    Chest pain  Assessment & Plan  Multimodal pain management - Lidocaine patches, Neurontin, Oyxcodone and IV Morphine prn, stop Tylenol    Elevated troponin- (present on admission)  Assessment & Plan  Effient  Cardiology has recommended outpatient follow up    Prolonged QT interval- (present on admission)  Assessment & Plan  EKG 6/12/2021 QTc - 598  EKG 6/14/2021 QTc - 600  Telemetry monitoring      Systolic CHF, chronic (HCC)- (present on admission)  Assessment & Plan  Holding Coreg, Isordil    Anxiety- (present on admission)  Assessment & Plan  Xanax prn    PVD (peripheral vascular disease) (HCC)- (present on admission)  Assessment & Plan  s/p left BKA  Coumadin    Severe protein-calorie malnutrition (HCC)- (present on admission)  Assessment & Plan  Nutrition consult    Frailty syndrome in geriatric patient- (present on admission)  Assessment & Plan  Palliative care      Superior mesenteric artery stenosis (HCC)- (present on admission)  Assessment & Plan  CT negative    Non-rheumatic mitral regurgitation- (present on admission)  Assessment & Plan  Severe     CAD (coronary artery disease)- (present on admission)  Assessment & Plan  History of stents  Effient, Ranexa  Holding Coreg and Isordil due to low blood pressure  Holding ASA    6/15 cardiology following, Dr. Castro  Cont current management    6/16 plan for outpatient follow-up  Continue conservative management  Will need evaluation for mitral valve      Essential hypertension- (present on admission)  Assessment & Plan  Hold Coreg, Isordil    Anemia in chronic kidney disease (CKD)- (present on admission)  Assessment & Plan  Follow cbc    C. difficile colitis- (present on admission)  Assessment & Plan  History of    ESRD (end stage renal disease) on dialysis (HCC)- (present on admission)  Assessment & Plan  Previously on PD  Right Internal Jugular Tunneled Hemodialysis Catheter Placement. 6/11/2021  HD  per Nephrology    6/15 on HD, tolerating well  Nausea improved, cont HD, per nephrology    6/16 HD per nephrology  Needs outpatient dialysis, Monday Wednesday Friday arrangements   to assist    Status post below knee amputation of left lower extremity- (present on admission)  Assessment & Plan  PT and OT    Mixed hyperlipidemia- (present on admission)  Assessment & Plan  Lipitor    Hypothyroid- (present on admission)  Assessment & Plan  Synthroid       VTE prophylaxis: Coumadin

## 2021-06-17 ENCOUNTER — PATIENT OUTREACH (OUTPATIENT)
Dept: HEALTH INFORMATION MANAGEMENT | Facility: OTHER | Age: 66
End: 2021-06-17

## 2021-06-17 VITALS
TEMPERATURE: 97.3 F | WEIGHT: 114.42 LBS | OXYGEN SATURATION: 92 % | RESPIRATION RATE: 17 BRPM | HEART RATE: 68 BPM | BODY MASS INDEX: 16.38 KG/M2 | DIASTOLIC BLOOD PRESSURE: 57 MMHG | HEIGHT: 70 IN | SYSTOLIC BLOOD PRESSURE: 83 MMHG

## 2021-06-17 LAB
BACTERIA BLD CULT: NORMAL
BACTERIA BLD CULT: NORMAL
INR PPP: 2.03 (ref 0.87–1.13)
PROTHROMBIN TIME: 22.3 SEC (ref 12–14.6)
SIGNIFICANT IND 70042: NORMAL
SIGNIFICANT IND 70042: NORMAL
SITE SITE: NORMAL
SITE SITE: NORMAL
SOURCE SOURCE: NORMAL
SOURCE SOURCE: NORMAL

## 2021-06-17 PROCEDURE — 700102 HCHG RX REV CODE 250 W/ 637 OVERRIDE(OP): Performed by: STUDENT IN AN ORGANIZED HEALTH CARE EDUCATION/TRAINING PROGRAM

## 2021-06-17 PROCEDURE — A9270 NON-COVERED ITEM OR SERVICE: HCPCS | Performed by: FAMILY MEDICINE

## 2021-06-17 PROCEDURE — A9270 NON-COVERED ITEM OR SERVICE: HCPCS | Performed by: NURSE PRACTITIONER

## 2021-06-17 PROCEDURE — 97535 SELF CARE MNGMENT TRAINING: CPT

## 2021-06-17 PROCEDURE — A9270 NON-COVERED ITEM OR SERVICE: HCPCS | Performed by: STUDENT IN AN ORGANIZED HEALTH CARE EDUCATION/TRAINING PROGRAM

## 2021-06-17 PROCEDURE — 36415 COLL VENOUS BLD VENIPUNCTURE: CPT

## 2021-06-17 PROCEDURE — 700102 HCHG RX REV CODE 250 W/ 637 OVERRIDE(OP): Performed by: PHYSICIAN ASSISTANT

## 2021-06-17 PROCEDURE — 700102 HCHG RX REV CODE 250 W/ 637 OVERRIDE(OP): Performed by: FAMILY MEDICINE

## 2021-06-17 PROCEDURE — 97530 THERAPEUTIC ACTIVITIES: CPT

## 2021-06-17 PROCEDURE — 99233 SBSQ HOSP IP/OBS HIGH 50: CPT | Performed by: INTERNAL MEDICINE

## 2021-06-17 PROCEDURE — 85610 PROTHROMBIN TIME: CPT

## 2021-06-17 PROCEDURE — 99239 HOSP IP/OBS DSCHRG MGMT >30: CPT | Performed by: INTERNAL MEDICINE

## 2021-06-17 PROCEDURE — 700102 HCHG RX REV CODE 250 W/ 637 OVERRIDE(OP): Performed by: NURSE PRACTITIONER

## 2021-06-17 PROCEDURE — A9270 NON-COVERED ITEM OR SERVICE: HCPCS | Performed by: PHYSICIAN ASSISTANT

## 2021-06-17 RX ORDER — WARFARIN SODIUM 1 MG/1
0.5 TABLET ORAL DAILY
Qty: 30 TABLET | Refills: 3 | Status: SHIPPED | OUTPATIENT
Start: 2021-06-17

## 2021-06-17 RX ORDER — GABAPENTIN 100 MG/1
100 CAPSULE ORAL 3 TIMES DAILY
Qty: 90 CAPSULE | Refills: 0 | Status: SHIPPED | OUTPATIENT
Start: 2021-06-17

## 2021-06-17 RX ORDER — ACETAMINOPHEN 325 MG/1
650 TABLET ORAL EVERY 6 HOURS PRN
Status: DISCONTINUED | OUTPATIENT
Start: 2021-06-17 | End: 2021-06-17

## 2021-06-17 RX ORDER — AMOXICILLIN 250 MG
1 CAPSULE ORAL 2 TIMES DAILY
Qty: 30 TABLET | Refills: 0 | Status: SHIPPED | OUTPATIENT
Start: 2021-06-17

## 2021-06-17 RX ORDER — LIDOCAINE 50 MG/G
2 PATCH TOPICAL EVERY 24 HOURS
Qty: 10 PATCH | Refills: 0 | Status: SHIPPED | OUTPATIENT
Start: 2021-06-17

## 2021-06-17 RX ADMIN — NITROGLYCERIN 0.5 INCH: 20 OINTMENT TOPICAL at 05:14

## 2021-06-17 RX ADMIN — PRASUGREL 10 MG: 10 TABLET, FILM COATED ORAL at 05:14

## 2021-06-17 RX ADMIN — RANOLAZINE 500 MG: 500 TABLET, FILM COATED, EXTENDED RELEASE ORAL at 07:19

## 2021-06-17 RX ADMIN — OXYCODONE HYDROCHLORIDE AND ACETAMINOPHEN 500 MG: 500 TABLET ORAL at 05:14

## 2021-06-17 RX ADMIN — CARVEDILOL 6.25 MG: 6.25 TABLET, FILM COATED ORAL at 07:19

## 2021-06-17 RX ADMIN — CYANOCOBALAMIN TAB 500 MCG 500 MCG: 500 TAB at 05:14

## 2021-06-17 RX ADMIN — LEVOTHYROXINE SODIUM 150 MCG: 0.15 TABLET ORAL at 05:13

## 2021-06-17 RX ADMIN — ISOSORBIDE DINITRATE 30 MG: 30 TABLET ORAL at 05:14

## 2021-06-17 RX ADMIN — Medication 2000 UNITS: at 05:14

## 2021-06-17 RX ADMIN — GABAPENTIN 100 MG: 100 CAPSULE ORAL at 05:14

## 2021-06-17 ASSESSMENT — COGNITIVE AND FUNCTIONAL STATUS - GENERAL
MOVING TO AND FROM BED TO CHAIR: A LITTLE
SUGGESTED CMS G CODE MODIFIER MOBILITY: CJ
CLIMB 3 TO 5 STEPS WITH RAILING: A LITTLE
MOBILITY SCORE: 21
MOVING FROM LYING ON BACK TO SITTING ON SIDE OF FLAT BED: A LITTLE

## 2021-06-17 ASSESSMENT — PATIENT HEALTH QUESTIONNAIRE - PHQ9
1. LITTLE INTEREST OR PLEASURE IN DOING THINGS: NOT AT ALL
2. FEELING DOWN, DEPRESSED, IRRITABLE, OR HOPELESS: NOT AT ALL
SUM OF ALL RESPONSES TO PHQ9 QUESTIONS 1 AND 2: 0

## 2021-06-17 ASSESSMENT — ENCOUNTER SYMPTOMS
WEAKNESS: 1
STRIDOR: 0
CHEST TIGHTNESS: 0
SHORTNESS OF BREATH: 0
WHEEZING: 0
FATIGUE: 1
CHOKING: 0
COUGH: 0
APNEA: 0

## 2021-06-17 ASSESSMENT — GAIT ASSESSMENTS
DISTANCE (FEET): 35
ASSISTIVE DEVICE: FRONT WHEEL WALKER
GAIT LEVEL OF ASSIST: SUPERVISED

## 2021-06-17 ASSESSMENT — FIBROSIS 4 INDEX: FIB4 SCORE: 12.37

## 2021-06-17 ASSESSMENT — PAIN DESCRIPTION - PAIN TYPE: TYPE: ACUTE PAIN

## 2021-06-17 NOTE — THERAPY
Physical Therapy   Daily Treatment     Patient Name: Francis Purvis  Age:  66 y.o., Sex:  male  Medical Record #: [id number]  Today's Date: 6/17/2021     Precautions: Fall Risk (hx of BKA with prosthesis)    Assessment    Pt progressing as expected given co-morbidities and recently limited mobility. He was able to demonstrate short distance ambulation with FWW with Spv with prosthesis and contralateral shoe donned with no LOB. He reports no functional concerns with dc to home once medically cleared and appears functionally capable. Primary concerns with dc planning from this PT are regarding current low BP (83/54 sitting post ambulation) as well possible need for supplemental 02 for transport. Pt reports will need to meet his wife at home with his equipment in order to enter his home (disucssed with nursing/charge). See below for details/recs.     Plan    Continue current treatment plan.    DC Equipment Recommendations: None  Discharge Recommendations: Recommend home health for continued physical therapy services       06/17/21 1121   Balance   Sitting Balance (Static) Fair +   Sitting Balance (Dynamic) Fair +   Standing Balance (Static) Fair   Standing Balance (Dynamic) Fair   Weight Shift Sitting Fair   Weight Shift Standing Fair   Skilled Intervention Verbal Cuing;Compensatory Strategies   Comments no mindi LOB during ambulation with FWW   Gait Analysis   Gait Level Of Assist Supervised   Assistive Device Front Wheel Walker   Distance (Feet) 35   # of Times Distance was Traveled 2   Deviation Decreased Heel Strike;Decreased Toe Off;Other (Comment);Decreased Base Of Support  (dec danielle/narrow RICH)   # of Stairs Climbed 0   Weight Bearing Status no restrictions   Skilled Intervention Compensatory Strategies;Verbal Cuing   Comments see balance; limited more 2' to concerns iwth BP 83/54 post amb   Bed Mobility    Supine to Sit Supervised   Sit to Supine   (pt requested to sit EOB post PT visit)   Scooting  Supervised   Comments pt exits bed without physical assist   Functional Mobility   Sit to Stand Supervised   Bed, Chair, Wheelchair Transfer Supervised   Transfer Method Stand Step   Mobility with FWW;    How much difficulty does the patient currently have...   Turning over in bed (including adjusting bedclothes, sheets and blankets)? 4   Sitting down on and standing up from a chair with arms (e.g., wheelchair, bedside commode, etc.) 3   Moving from lying on back to sitting on the side of the bed? 3   How much help from another person does the patient currently need...   Moving to and from a bed to a chair (including a wheelchair)? 4   Need to walk in a hospital room? 4   Climbing 3-5 steps with a railing? 3   6 clicks Mobility Score 21   Activity Tolerance   Sitting in Chair NT   Sitting Edge of Bed functional   Standing at least 5 mins   Comments no overt/acute fatigue   Patient / Family Goals    Patient / Family Goal #1 get strength back   Goal #1 Outcome Progressing as expected   Short Term Goals    Short Term Goal # 1 pt will be able to complete functional transfers with lofstrand crutch and SPV in 6tx in order to return [facility]   Goal Outcome # 1 Progressing as expected   Short Term Goal # 2 pt will be able to ambulate 150ft with lofstrand crutch and SPV in 6tx in order to return home at baseline   Goal Outcome # 2 Progressing as expected   Short Term Goal # 3 pt will be able to negotiate 3 steps with min assist and crutch in 6tx in order to enter and exit home with assist of wife   Goal Outcome # 3 Goal not met   Education Group   Education Provided Use of Assistive Device   Role of Physical Therapist Patient Response Patient;Acceptance;Explanation;Verbal Demonstration   Use of Assistive Device Patient Response Patient;Acceptance;Explanation;Verbal Demonstration;Action Demonstration   Additional Comments discussion/education re: dc planning considerations and recs for  PT for optimal progression; pt  reports no concerns with dc to home in current condition, reports appropriate strategies for enter/exit home if unable to sequence stairs (though anticipate pt will be able to ambulte up/down stairs with prosthesis given current objective findings);

## 2021-06-17 NOTE — PROGRESS NOTES
Received bedside report and assumed care of patient. Patient is sleeping in bed. Patient showing no signs of distress, complaints of pain, and is on 4L via nasal cannula. Tele monitor in place. Fall precautions are in place. Call light within reach, bed in low position, 2 side rails up, and belongings are within reach.  Patient was updated on Plan of Care, no questions or concerns. Pt educated on use of call light for assistance.

## 2021-06-17 NOTE — PROGRESS NOTES
Monitor Summary:    SR - ST   (R) COUP (R) PVC (R) TRIG  5BTS VTACH  BBB@ .14  .19/.09/.34

## 2021-06-17 NOTE — PROGRESS NOTES
Pt. D/c'd with sister. Discharge teaching completed, no further questions or concerns. All personal belongings with pt. Pt is A&OX4. No signs of distress. All lines removed, tele box removed and placed in monitor room, and monitors notified.

## 2021-06-17 NOTE — DISCHARGE PLANNING
Anticipated Discharge Disposition: Home with HH    Action: Pt accepted with [facility].   Notified by RN pt needs assistance with transport home. Pt unable to pay for transport and requested ride to his sisters home in [facility]. Cab voucher provided to assist with getting pt home.     [facility] delivered and faxed to [facility]    Barriers to Discharge: None    Plan: D/C home

## 2021-06-17 NOTE — PROGRESS NOTES
Cardiology Follow Up Progress Note    Date of Service  6/17/2021    Attending Physician  Radha Gr D.O.    Chief Complaint     presents with intractable N/V with a 45 lb weight loss underwent EGD 6/8/21 showing gastric polyps, dyskinesia of gall bladder N/V resolved, ESRD contributory.    HPI  Francis Purvis is a 66 y.o. malewith PMH including testicular CA s/p whole body radiation in the 1970s, hypothyroidism, Dyslipidemia, Essential hypertension, ESRD was on PD.  He has an extensive CV hx including mod-severe mitral regurgitation, CAD, ICMO with EF 35% after inferior STEMI in 2/2021 and PCI to the RCA then LM stent on 4/30/2021 followed by PTCA to the mRCA 5/1/21 - treated at [facility].  He developed LBBB after his procedure on 5/2/2021 EKG from [facility].  He has PAD s/p Left BKA, s/p aortobifem bypass, has porcelain aorta.  He is on chronic warfarin therpay for PAD.      Cardiology was consulted to due to elevated BNP and troponin (1502, 2128).        Interim Events    NO overnight cardiac events  Tele-SR with chacorta PVCs  Chest pain free currently, PRN nitro patch for recurrent chest pain.  /60  PT evaluation pending prior to DC        Review of Systems  Review of Systems   Constitutional: Positive for fatigue.   Respiratory: Negative for apnea, cough, choking, chest tightness, shortness of breath, wheezing and stridor.    Neurological: Positive for weakness.       Vital signs in last 24 hours  Temp:  [35.9 °C (96.6 °F)-36.8 °C (98.2 °F)] 36 °C (96.8 °F)  Pulse:  [] 76  Resp:  [15-20] 16  BP: ()/(48-76) 116/60  SpO2:  [91 %-100 %] 98 %    Physical Exam  Physical Exam  Constitutional:       Comments: Frail, ill appearing, BMI 16   Cardiovascular:      Rate and Rhythm: Normal rate and regular rhythm.      Heart sounds: Murmur heard.   Systolic murmur is present with a grade of 2/6.     Pulmonary:      Effort: Pulmonary effort is normal.   Musculoskeletal:      Comments: LBK amputation    Skin:     General: Skin is warm.   Neurological:      Mental Status: He is alert. Mental status is at baseline.   Psychiatric:         Mood and Affect: Mood normal.         Lab Review  Lab Results   Component Value Date/Time    WBC 9.7 06/15/2021 02:48 AM    RBC 3.27 (L) 06/15/2021 02:48 AM    HEMOGLOBIN 11.0 (L) 06/15/2021 02:48 AM    HEMATOCRIT 34.5 (L) 06/15/2021 02:48 AM    .5 (H) 06/15/2021 02:48 AM    MCH 33.6 (H) 06/15/2021 02:48 AM    MCHC 31.9 (L) 06/15/2021 02:48 AM    MPV 11.5 06/15/2021 02:48 AM      Lab Results   Component Value Date/Time    SODIUM 135 06/15/2021 02:48 AM    POTASSIUM 4.4 06/15/2021 02:48 AM    CHLORIDE 98 06/15/2021 02:48 AM    CO2 22 06/15/2021 02:48 AM    GLUCOSE 87 06/15/2021 02:48 AM    BUN 28 (H) 06/15/2021 02:48 AM    CREATININE 4.79 (H) 06/15/2021 02:48 AM    BUNCREATRAT 17 04/06/2016 07:11 AM      Lab Results   Component Value Date/Time    ASTSGOT 924 (H) 06/15/2021 02:48 AM    ALTSGPT 1554 (H) 06/15/2021 02:48 AM     Lab Results   Component Value Date/Time    CHOLSTRLTOT 83 (L) 06/12/2021 07:50 AM    LDL 30 06/12/2021 07:50 AM    HDL 36 (A) 06/12/2021 07:50 AM    TRIGLYCERIDE 83 06/12/2021 07:50 AM    TROPONINT 2607 (H) 06/14/2021 09:20 PM       No results for input(s): NTPROBNP in the last 72 hours.    Cardiac Imaging and Procedures Review  EKG:  SR. LBBB    Echocardiogram:  6/6/21  Severely reduced left ventricular systolic function.  Left ventricular ejection fraction is visually estimated to be 30%.  Severe mitral regurgitation.  Moderate aortic insufficiency.  Mild tricuspid regurgitation.  Estimated right ventricular systolic pressure is 45 mmHg.        Cardiac Catheterization:     [facility] 2/23/21 [place]:  Acute instent thrombosis of the RCA stent with STEMI.       [facility] 4/30/2021 outside hosp:  Left main stent      [facility] 5/1/2021 outside hosp:  PTCA of the mid RCA, unable to deploy stent      Imaging  Chest X-Ray:  No acute cardiopulmonary abnormality.    Stress  Test:  n/a    Assessment/Plan    Elevated troponin. Peaked at 2600 ng/L  HX of CAD/STEMI  s/p PCI/NADIR to RCA 1/2021  -S/p impella assist PCI/NADIR LM & due to restenosis of RCA s/p PTCA RCA 4/30/21.  -no ASA on Warfarin, Effient, Coreg 6.25 BID, Imdur  -Holding Lipitor secondary to transaminitis.    chronic angina  -continue with Imdur  -PRN Nitro patch for recurrent chest pain  -Currently chest pain free  -ON Ranexa.      Moderate to severe MR  -continue with HD for volume control  -Consider mitral clip as outpatient    Ischemic cardiomyopathy, LVEF 30%  -GDMT  -Coreg, NO ACE/ARB unless ok with nephrology & if BP allows.  -NO Spironolactone unless ok with Nephrology in the setting of ESRD.      Acute on chronic decompensated HF, NYHA class 3 stage C  -Volume management via HD  -Appears euvolemic    ESRD  -ON HD  appreciate nephrology    PAD  -S/p LBA  -On Chronic warfarin  -INR 2    Transaminitis  -Assume passive liver congestion  monitor LFTs.      NO further cardiac work up  Plan is for the patient to get evaluated by PT prior to dc.  He understands to follow up with his own cardiologist next week in [place].  Cardiology will sign off.    Thank you for allowing me to participate in the care of this patient.      Please contact me with any questions.    IFTIKHAR Sweeney.   Cardiologist, [facility]  [phone]

## 2021-06-17 NOTE — DISCHARGE INSTRUCTIONS
Discharge Instructions    Discharged to home by taxi with escort. Discharged via wheelchair, hospital escort:Yes  Special equipment needed: Not Applicable    Be sure to schedule a follow-up appointment with your primary care doctor or any specialists as instructed.     Discharge Plan:   Diet Plan: Discussed  Activity Level: Discussed  Confirmed Follow up Appointment: Patient to Call and Schedule Appointment  Confirmed Symptoms Management: Discussed  Medication Reconciliation Updated: Yes    I understand that a diet low in cholesterol, fat, and sodium is recommended for good health. Unless I have been given specific instructions below for another diet, I accept this instruction as my diet prescription.   Other diet: regular     Special Instructions: None    · Is patient discharged on Warfarin / Coumadin?   Yes    You are receiving the drug warfarin. Please understand the importance of monitoring warfarin with scheduled PT/INR blood draws.  Follow-up with a call to your personal Doctor's office in 3 days to schedule a PT/INR. .    IMPORTANT: HOW TO USE THIS INFORMATION:  This is a summary and does NOT have all possible information about this product. This information does not assure that this product is safe, effective, or appropriate for you. This information is not individual medical advice and does not substitute for the advice of your health care professional. Always ask your health care professional for complete information about this product and your specific health needs.      WARFARIN - ORAL (WARF-uh-rin)      COMMON BRAND NAME(S): Coumadin      WARNING:  Warfarin can cause very serious (possibly fatal) bleeding. This is more likely to occur when you first start taking this medication or if you take too much warfarin. To decrease your risk for bleeding, your doctor or other health care provider will monitor you closely and check your lab results (INR test) to make sure you are not taking too much warfarin.  "Keep all medical and laboratory appointments. Tell your doctor right away if you notice any signs of serious bleeding. See also Side Effects section.      USES:  This medication is used to treat blood clots (such as in deep vein thrombosis-DVT or pulmonary embolus-PE) and/or to prevent new clots from forming in your body. Preventing harmful blood clots helps to reduce the risk of a stroke or heart attack. Conditions that increase your risk of developing blood clots include a certain type of irregular heart rhythm (atrial fibrillation), heart valve replacement, recent heart attack, and certain surgeries (such as hip/knee replacement). Warfarin is commonly called a \"blood thinner,\" but the more correct term is \"anticoagulant.\" It helps to keep blood flowing smoothly in your body by decreasing the amount of certain substances (clotting proteins) in your blood.      HOW TO USE:  Read the Medication Guide provided by your pharmacist before you start taking warfarin and each time you get a refill. If you have any questions, ask your doctor or pharmacist. Take this medication by mouth with or without food as directed by your doctor or other health care professional, usually once a day. It is very important to take it exactly as directed. Do not increase the dose, take it more frequently, or stop using it unless directed by your doctor. Dosage is based on your medical condition, laboratory tests (such as INR), and response to treatment. Your doctor or other health care provider will monitor you closely while you are taking this medication to determine the right dose for you. Use this medication regularly to get the most benefit from it. To help you remember, take it at the same time each day. It is important to eat a balanced, consistent diet while taking warfarin. Some foods can affect how warfarin works in your body and may affect your treatment and dose. Avoid sudden large increases or decreases in your intake of foods " high in vitamin K (such as broccoli, cauliflower, cabbage, brussels sprouts, kale, spinach, and other green leafy vegetables, liver, green tea, certain vitamin supplements). If you are trying to lose weight, check with your doctor before you try to go on a diet. Cranberry products may also affect how your warfarin works. Limit the amount of cranberry juice (16 ounces/480 milliliters a day) or other cranberry products you may drink or eat.      SIDE EFFECTS:  Nausea, loss of appetite, or stomach/abdominal pain may occur. If any of these effects persist or worsen, tell your doctor or pharmacist promptly. Remember that your doctor has prescribed this medication because he or she has judged that the benefit to you is greater than the risk of side effects. Many people using this medication do not have serious side effects. This medication can cause serious bleeding if it affects your blood clotting proteins too much (shown by unusually high INR lab results). Even if your doctor stops your medication, this risk of bleeding can continue for up to a week. Tell your doctor right away if you have any signs of serious bleeding, including: unusual pain/swelling/discomfort, unusual/easy bruising, prolonged bleeding from cuts or gums, persistent/frequent nosebleeds, unusually heavy/prolonged menstrual flow, pink/dark urine, coughing up blood, vomit that is bloody or looks like coffee grounds, severe headache, dizziness/fainting, unusual or persistent tiredness/weakness, bloody/black/tarry stools, chest pain, shortness of breath, difficulty swallowing. Tell your doctor right away if any of these unlikely but serious side effects occur: persistent nausea/vomiting, severe stomach/abdominal pain, yellowing eyes/skin. This drug rarely has caused very serious (possibly fatal) problems if its effects lead to small blood clots (usually at the beginning of treatment). This can lead to severe skin/tissue damage that may require surgery or  amputation if left untreated. Patients with certain blood conditions (protein C or S deficiency) may be at greater risk. Get medical help right away if any of these rare but serious side effects occur: painful/red/purplish patches on the skin (such as on the toe, breast, abdomen), change in the amount of urine, vision changes, confusion, slurred speech, weakness on one side of the body. A very serious allergic reaction to this drug is rare. However, get medical help right away if you notice any symptoms of a serious allergic reaction, including: rash, itching/swelling (especially of the face/tongue/throat), severe dizziness, trouble breathing. This is not a complete list of possible side effects. If you notice other effects not listed above, contact your doctor or pharmacist. In the [place] - Call your doctor for medical advice about side effects. You may report side effects to FDA at 2-957-DJM-1300. In [place] - Call your doctor for medical advice about side effects. You may report side effects to [facility] [place] at 1-847.587.5506.      PRECAUTIONS:  Before taking warfarin, tell your doctor or pharmacist if you are allergic to it; or if you have any other allergies. This product may contain inactive ingredients, which can cause allergic reactions or other problems. Talk to your pharmacist for more details. Before using this medication, tell your doctor or pharmacist your medical history, especially of: blood disorders (such as anemia, hemophilia), bleeding problems (such as bleeding of the stomach/intestines, bleeding in the brain), blood vessel disorders (such as aneurysms), recent major injury/surgery, liver disease, alcohol use, mental/mood disorders (including memory problems), frequent falls/injuries. It is important that all your doctors and dentists know that you take warfarin. Before having surgery or any medical/dental procedures, tell your doctor or dentist that you are taking this medication and about all the  products you use (including prescription drugs, nonprescription drugs, and herbal products). Avoid getting injections into the muscles. If you must have an injection into a muscle (for example, a flu shot), it should be given in the arm. This way, it will be easier to check for bleeding and/or apply pressure bandages. This medication may cause stomach bleeding. Daily use of alcohol while using this medicine will increase your risk for stomach bleeding and may also affect how this medication works. Limit or avoid alcoholic beverages. If you have not been eating well, if you have an illness or infection that causes fever, vomiting, or diarrhea for more than 2 days, or if you start using any antibiotic medications, contact your doctor or pharmacist immediately because these conditions can affect how warfarin works. This medication can cause heavy bleeding. To lower the chance of getting cut, bruised, or injured, use great caution with sharp objects like safety razors and nail cutters. Use an electric razor when shaving and a soft toothbrush when brushing your teeth. Avoid activities such as contact sports. If you fall or injure yourself, especially if you hit your head, call your doctor immediately. Your doctor may need to check you. The Food & Drug Administration has stated that generic warfarin products are interchangeable. However, consult your doctor or pharmacist before switching warfarin products. Be careful not to take more than one medication that contains warfarin unless specifically directed by the doctor or health care provider who is monitoring your warfarin treatment. Older adults may be at greater risk for bleeding while using this drug. This medication is not recommended for use during pregnancy because of serious (possibly fatal) harm to an unborn baby. Discuss the use of reliable forms of birth control with your doctor. If you become pregnant or think you may be pregnant, tell your doctor immediately.  "If you are planning pregnancy, discuss a plan for managing your condition with your doctor before you become pregnant. Your doctor may switch the type of medication you use during pregnancy. Very small amounts of this medication may pass into breast milk but is unlikely to harm a nursing infant. Consult your doctor before breast-feeding.      DRUG INTERACTIONS:  Drug interactions may change how your medications work or increase your risk for serious side effects. This document does not contain all possible drug interactions. Keep a list of all the products you use (including prescription/nonprescription drugs and herbal products) and share it with your doctor and pharmacist. Do not start, stop, or change the dosage of any medicines without your doctor's approval. Warfarin interacts with many prescription, nonprescription, vitamin, and herbal products. This includes medications that are applied to the skin or inside the vagina or rectum. The interactions with warfarin usually result in an increase or decrease in the \"blood-thinning\" (anticoagulant) effect. Your doctor or other health care professional should closely monitor you to prevent serious bleeding or clotting problems. While taking warfarin, it is very important to tell your doctor or pharmacist of any changes in medications, vitamins, or herbal products that you are taking. Some products that may interact with this drug include: capecitabine, imatinib, mifepristone. Aspirin, aspirin-like drugs (salicylates), and nonsteroidal anti-inflammatory drugs (NSAIDs such as ibuprofen, naproxen, celecoxib) may have effects similar to warfarin. These drugs may increase the risk of bleeding problems if taken during treatment with warfarin. Carefully check all prescription/nonprescription product labels (including drugs applied to the skin such as pain-relieving creams) since the products may contain NSAIDs or salicylates. Talk to your doctor about using a different " medication (such as acetaminophen) to treat pain/fever. Low-dose aspirin and related drugs (such as clopidogrel, ticlopidine) should be continued if prescribed by your doctor for specific medical reasons such as heart attack or stroke prevention. Consult your doctor or pharmacist for more details. Many herbal products interact with warfarin. Tell your doctor before taking any herbal products, especially bromelains, coenzyme Q10, cranberry, danshen, dong quai, fenugreek, garlic, ginkgo biloba, ginseng, and [place] wort, among others. This medication may interfere with a certain laboratory test to measure theophylline levels, possibly causing false test results. Make sure laboratory personnel and all your doctors know you use this drug.      OVERDOSE:  If overdose is suspected, contact a poison control center or emergency room immediately. [place] residents can call the [place] National Poison Hotline at 1-312.723.6109. [place] residents can call a provincial poison control center. Symptoms of overdose may include: bloody/black/tarry stools, pink/dark urine, unusual/prolonged bleeding.      NOTES:  Do not share this medication with others. Laboratory and/or medical tests (such as INR, complete blood count) must be performed periodically to monitor your progress or check for side effects. Consult your doctor for more details.      MISSED DOSE:  For the best possible benefit, do not miss any doses. If you do miss a dose and remember on the same day, take it as soon as you remember. If you remember on the next day, skip the missed dose and resume your usual dosing schedule. Do not double the dose to catch up because this could increase your risk for bleeding. Keep a record of missed doses to give to your doctor or pharmacist. Contact your doctor or pharmacist if you miss 2 or more doses in a row.      STORAGE:  Store at room temperature away from light and moisture. Do not store in the bathroom. Keep all medications away from  children and pets. Do not flush medications down the toilet or pour them into a drain unless instructed to do so. Properly discard this product when it is  or no longer needed. Consult your pharmacist or local waste disposal company for more details about how to safely discard your product.      MEDICAL ALERT:  Your condition and medication can cause complications in a medical emergency. For information about enrolling in MedicAlert, call 1-676.615.9604 ([place]) or 1-849.574.5290 ([place]).      Information last revised 2010 Copyright(c)  [employer]       Mitral Valve Regurgitation    Mitral valve regurgitation, also called mitral regurgitation, is a condition in which some blood leaks back (regurgitates) through the mitral valve in the heart. The mitral valve is located between the upper left chamber (left atrium) and the lower left chamber (left ventricle) of the heart. When blood travels through the heart, it goes from the left atrium to the left ventricle and then out to the body. Normally, the mitral valve opens when the atrium pumps blood into the ventricle, and it closes when the ventricle pumps blood out to the body.  Mitral valve regurgitation happens when the mitral valve does not close properly. As a result, blood in the ventricle leaks back into the atrium. Mitral valve regurgitation causes the heart to work harder to pump blood. If the condition is mild, a person may not have symptoms. However, over time, this can lead to heart failure.  What are the causes?  This condition may be caused by:  · A condition in which the mitral valve does not close completely when the heart pumps blood (mitral valve prolapse).  · Heart valve infection (endocarditis).  · Certain types of heart disease.  · A condition that causes inflammation of the heart, blood vessels, or joints (rheumatic fever).  · Certain conditions that are present at birth (congenital heart defect).  · Previous radiation  therapy to the chest area.  · Damage to the mitral valve, such as from injury (trauma) to the heart or a heart attack.  · Certain combinations of weight-loss (anti-obesity) medicines.  What are the signs or symptoms?  In some cases of mild to moderate mitral regurgitation, there are no symptoms.  Symptoms of this condition include:  · Shortness of breath.  · Fatigue.  · Activity intolerance.  · Cough.  Severe symptoms of this condition include:  · Suddenly waking up at night with difficulty breathing.  · Fast or irregular heartbeat.  · Swelling in the lower legs, ankles, and feet.  · Fluid in the lungs that makes it very hard to breathe (pulmonary edema).  How is this diagnosed?  This condition may be diagnosed based on:  · A physical exam. Your health care provider will listen to your heart for an abnormal heart sound (murmur).  · Tests to confirm the diagnosis. These may include:  ? A test that creates ultrasound images of the heart (echocardiogram). This test allows your health care provider to see how the heart valves work while your heart is beating.  ? Chest X-ray.  ? A test that records the electrical impulses of the heart (electrocardiogram, or ECG).  ? A test that looks at the structure and function of the heart (cardiac catheterization).  How is this treated?  This condition may be treated with:  · Medicines. These may be given to treat symptoms and prevent complications.  · Surgery or catheter-based procedures to repair or replace the mitral valve. This may be done in severe, long-term (chronic) cases.  Follow these instructions at home:  Eating and drinking    · Eat a heart-healthy diet that includes whole grains, fresh fruits and vegetables, low-fat (lean) proteins, and low-fat or nonfat dairy products. Consider working with a dietitian to help you make healthy food choices.  · Limit how much salt (sodium) you eat as told by your health care provider. Follow instructions from your health care provider  about any other eating or drinking restrictions, such as limiting foods that are high in fat and processed sugars.  · Use healthy cooking methods, such as roasting, grilling, broiling, baking, poaching, steaming, or stir-frying.  Alcohol use  · Do not drink alcohol if:  ? Your health care provider tells you not to drink.  ? You are pregnant, may be pregnant, or are planning to become pregnant.  · If you drink alcohol:  ? Limit how much you use to:  § 0-1 drink a day for women.  § 0-2 drinks a day for men.  ? Be aware of how much alcohol is in your drink. In the [place] one drink equals one 12 oz bottle of beer (355 mL), one 5 oz glass of wine (148 mL), or one 1½ oz glass of hard liquor (44 mL).  Activity  · Return to your normal activities as told by your health care provider. Ask your health care provider what activities are safe for you.  · Regular exercise is important for the health of your heart and for maintaining a healthy weight. Ask your health care provider what type of exercise is safe for you. You may need to avoid strenuous exercise.  Lifestyle  · Maintain a healthy weight.  · Do not use any products that contain nicotine or tobacco, such as cigarettes, e-cigarettes, and chewing tobacco. If you need help quitting, ask your health care provider.  · Find ways to manage stress. If you need help with this, ask your health care provider.  General instructions  · Take over-the-counter and prescription medicines only as told by your health care provider.  · Work closely with your health care provider to manage any other health conditions you have, such as diabetes or high blood pressure.  · If you plan to become pregnant, talk with your health care provider first.  · Keep all follow-up visits as told by your health care provider. This is important.  Contact a health care provider if you:  · Have a fever.  · Feel more tired than usual when doing physical activity.  · Have a dry cough.  Get help right away if  you:  · Have shortness of breath.  · Develop chest pain.  · Have swelling in your hands, feet, ankles, or abdomen that is getting worse.  · Have trouble staying awake or you faint.  · Feel dizzy or unsteady.  · Suddenly gain weight.  · Feel confused.  These symptoms may represent a serious problem that is an emergency. Do not wait to see if the symptoms will go away. Get medical help right away. Call your local emergency services (911 in the [place] Do not drive yourself to the hospital.  Summary  · Mitral valve regurgitation, also called mitral regurgitation, is a condition in which some blood leaks back (regurgitates) through the mitral valve in the heart.  · Depending on how severe your condition is, you may be treated with medicines, catheter-based procedures, or surgery.  · Practice heart-healthy habits to manage this condition. These include limiting alcohol, avoiding nicotine and tobacco, and eating a heart-healthy diet that is low in salt (sodium).  This information is not intended to replace advice given to you by your health care provider. Make sure you discuss any questions you have with your health care provider.  Document Released: 03/07/2006 Document Revised: 12/01/2019 Document Reviewed: 12/01/2019  [employer] Patient Education © 2020 [employer]              Depression / Suicide Risk    As you are discharged from this [facility] facility, it is important to learn how to keep safe from harming yourself.    Recognize the warning signs:  · Abrupt changes in personality, positive or negative- including increase in energy   · Giving away possessions  · Change in eating patterns- significant weight changes-  positive or negative  · Change in sleeping patterns- unable to sleep or sleeping all the time   · Unwillingness or inability to communicate  · Depression  · Unusual sadness, discouragement and loneliness  · Talk of wanting to die  · Neglect of personal appearance   · Rebelliousness- reckless  behavior  · Withdrawal from people/activities they love  · Confusion- inability to concentrate     If you or a loved one observes any of these behaviors or has concerns about self-harm, here's what you can do:  · Talk about it- your feelings and reasons for harming yourself  · Remove any means that you might use to hurt yourself (examples: pills, rope, extension cords, firearm)  · Get professional help from the community (Mental Health, Substance Abuse, psychological counseling)  · Do not be alone:Call your Safe Contact- someone whom you trust who will be there for you.  · Call your local CRISIS HOTLINE 557-7365 or 138-471-9466  · Call your local Children's Mobile Crisis Response Team [place] (185) 792-2837 or www.[employer] Network  · Call the toll free National Suicide Prevention Hotlines   · National Suicide Prevention Lifeline 414-702-MRRW (2778)  · National Hope Line Network 800-SUICIDE (594-1704)

## 2021-06-17 NOTE — PROGRESS NOTES
Bedside report received from night RN; assumed pt care. Pt assessment complete. Pt Aox4. Reviewed plan of care with pt. Pt is Telebox on and rhythm verified. Chart and labs reviewed. Bed in lowest position and two side rails up. Pt educated on call light: call light within reach.

## 2021-06-17 NOTE — FACE TO FACE
Face to Face Supporting Documentation - Home Health    The encounter with this patient was in whole or in part the primary reason for home health admission.    Date of encounter:   Patient:                    MRN:                       YOB: 2022  Francis Purvis  [id number]  1955     Home health to see patient for:  Skilled Nursing care for assessment, interventions & education, Physical Therapy evaluation and treatment and Occupational therapy evaluation and treatment    Skilled need for:  Exacerbation of Chronic Disease State esrd    Skilled nursing interventions to include:  Comment: pt/ot    Homebound status evidenced by:  Need the aid of supportive devices such as crutches, canes, wheelchairs or walkers or Needs the assistance of another person in order to leave the home. Leaving home requires a considerable and taxing effort. There is a normal inability to leave the home.    Community Physician to provide follow up care: Kali Liang M.D.     Optional Interventions? No      I certify the face to face encounter for this home health care referral meets the CMS requirements and the encounter/clinical assessment with the patient was, in whole, or in part, for the medical condition(s) listed above, which is the primary reason for home health care. Based on my clinical findings: the service(s) are medically necessary, support the need for home health care, and the homebound criteria are met.  I certify that this patient has had a face to face encounter by myself.  Radha Gr D.O. - NPI: [id number]

## 2021-06-17 NOTE — DISCHARGE SUMMARY
Discharge Summary    CHIEF COMPLAINT ON ADMISSION  No chief complaint on file.      Reason for Admission  Hypothyroid, new onset     Admission Date  6/4/2021    CODE STATUS  Full Code    HPI & HOSPITAL COURSE    66 y.o. male presented 6/4/2021 with nausea, vomiting and weight loss.  He had a CT abdomen and pelvis done in an outside facility which showed possible SMA stenosis.  CT scan of the abdomen pelvis here was noted to be negative.  Gastroenterology was consulted case, EGD was done which showed gastric polyps, nonerosive gastritis.  Patient was unable to do a gastric emptying scan.  Patient also with known history of end-stage renal disease on peritoneal dialysis.  It was felt that this may be contributing to his nausea and vomiting.  A right internal jugular hemodialysis catheter was placed on 6/11/2021, and hemodialysis has been initiated.  Also apparently developed chest pain, his troponin was markedly elevated at 1500, cardiology has been consulted on the case.  Known history of CAD with stents, and chronic systolic CHF.    The patient has been followed by nephrology as well as cardiology during this admission.  He has been started on hemodialysis and has tolerated procedure well.  Symptoms appear to be secondary to fluid congestion.  He is noted to have intermittent arrhythmia including ventricular tachyarrhythmia in which patient remains asymptomatic.  Clinically, patient appears to be back to baseline physical function.  He has been cleared by nephrology, cardiology and therapy for discharge to home and follow-up as an outpatient.  As per cardiology patient will need evaluation for pacemaker placement, however this will be followed up with primary cardiologist.    Dry weight to be determined at 52 kg.  Outpatient dialysis has been arranged.     Therefore, he is discharged in good and stable condition to home with organized home healthcare and close outpatient follow-up.    The patient met 2-midnight  criteria for an inpatient stay at the time of discharge.    Discharge Date  6/17    FOLLOW UP ITEMS POST DISCHARGE  Primary care provider in 1 week  Cardiology as scheduled  Nephrology for dialysis as scheduled  Anticoagulation clinic in 3 days.    DISCHARGE DIAGNOSES  Principal Problem:    Intractable nausea and vomiting POA: Yes  Active Problems:    Hypothyroid POA: Yes    Mixed hyperlipidemia POA: Yes      Overview: MIXED HYPERLIPIDEMIA            Formatting of this note might be different from the original.      MIXED HYPERLIPIDEMIA    Status post below knee amputation of left lower extremity (Chronic) POA: Yes      Overview: IMO 10/1 Regulatory Update    ESRD (end stage renal disease) on dialysis (HCC) POA: Yes    C. difficile colitis POA: Yes    Anemia in chronic kidney disease (CKD) POA: Yes      Overview: ANEMIA IN CHRONIC KIDNEY DISEASE            Formatting of this note might be different from the original.      ANEMIA IN CHRONIC KIDNEY DISEASE    Essential hypertension POA: Yes    CAD (coronary artery disease) POA: Yes    Non-rheumatic mitral regurgitation POA: Yes    Superior mesenteric artery stenosis (HCC) POA: Yes    Frailty syndrome in geriatric patient POA: Yes    Severe protein-calorie malnutrition (HCC) POA: Yes    PVD (peripheral vascular disease) (HCC) POA: Yes    Anxiety POA: Yes    Systolic CHF, chronic (HCC) POA: Yes    Prolonged QT interval POA: Yes    Elevated troponin POA: Yes    Chest pain POA: No    Hypokalemia POA: Yes    Hyponatremia POA: Yes    Supratherapeutic INR POA: No    Anemia POA: Yes    Thrombocytopenia (HCC) POA: Yes    Dyskinesia of gallbladder POA: Yes    Elevated LFTs POA: Yes  Resolved Problems:    * No resolved hospital problems. *      FOLLOW UP  No future appointments.  [address] [phone]          MEDICATIONS ON DISCHARGE     Medication List      START taking these medications      Instructions    gabapentin 100 MG Caps  Commonly known as: NEURONTIN   Take 1 capsule by mouth 3 times a day.  Dose: 100 mg     lidocaine 5 % Ptch  Commonly known as: LIDODERM   Place 2 Patches on the skin every 24 hours.  Dose: 2 Patch     senna-docusate 8.6-50 MG Tabs  Commonly known as: PERICOLACE or SENOKOT S   Take 1 tablet by mouth 2 times a day.  Dose: 1 tablet        CHANGE how you take these medications      Instructions   warfarin 1 MG Tabs  What changed:   · medication strength  · how much to take  · when to take this  Commonly known as: COUMADIN   Take 0.5 Tablets by mouth every day at 6 PM.  Dose: 0.5 mg        CONTINUE taking these medications      Instructions   albuterol 108 (90 Base) MCG/ACT Aers inhalation aerosol   Doctor's comments: Give albuterol that is patient or insurance preference please  inhale 1 to 2 puffs by mouth every 6 hours if needed for shortness of breath     ALPRAZolam 0.5 MG Tabs  Commonly known as: XANAX   take 1 tablet by mouth at bedtime if needed for sleep or anxiety     ascorbic acid 500 MG Tabs  Commonly known as: ascorbic acid   Take 500 mg by mouth every day.  Dose: 500 mg     carvedilol 6.25 MG Tabs  Commonly known as: COREG   TAKE 1 TABLET BY MOUTH TWICE A DAY     cyanocobalamin 500 MCG Tabs  Commonly known as: VITAMIN B-12   Take 500 mcg by mouth every day.  Dose: 500 mcg     fluticasone 50 MCG/ACT nasal spray  Commonly known as: FLONASE   Spray 2 Sprays in nose every day. Each Nostril  Dose: 2 Spray     isosorbide dinitrate 30 MG Tabs  Commonly known as: ISORDIL   Take 1 tablet by mouth 2 times a day.  Dose: 30 mg     levothyroxine 150 MCG Tabs  Commonly known as: SYNTHROID   TAKE 1 TABLET BY MOUTH EVERY MORNING ON AN EMPTY STOMACH.     oxyCODONE immediate-release 5 MG Tabs  Commonly known as: ROXICODONE   Take 5 mg by mouth.  Dose: 5 mg     prasugrel 10 MG Tabs  Commonly known as: EFFIENT   Take 10 mg by mouth every day.  Dose: 10 mg     ranolazine 500 MG Tb12  Commonly known as:  RANEXA   Take 500 mg by mouth 2 times a day.  Dose: 500 mg     vitamin D 1000 Unit (25 mcg) Tabs  Commonly known as: cholecalciferol   Take 2,000 Units by mouth every day.  Dose: 2,000 Units        STOP taking these medications    aspirin EC 81 MG Tbec  Commonly known as: ECOTRIN     atorvastatin 40 MG Tabs  Commonly known as: LIPITOR     calcium acetate 667 MG Caps  Commonly known as: Phos-Lo     Dexlansoprazole 60 MG Cpdr delayed-release capsule     Ferrous Sulfate 50 MG Tbcr     loratadine 10 MG Tabs  Commonly known as: CLARITIN     nitroglycerin 0.4 MG Subl  Commonly known as: NITROSTAT     ondansetron 4 MG Tbdp  Commonly known as: Zofran ODT     VITAMIN E PO            Allergies  Allergies   Allergen Reactions   • Amoxicillin-Pot Clavulanate Unspecified     body cramps  Body cramps   • Augmentin Unspecified     Body cramps   • Codeine Unspecified     HA  Tolerated Morphine 09/2020   • Promethazine Unspecified     muscle spasms       DIET  Orders Placed This Encounter   Procedures   • Diet Order Diet: Regular     Standing Status:   Standing     Number of Occurrences:   1     Order Specific Question:   Diet:     Answer:   Regular [1]       ACTIVITY  As tolerated.  Weight bearing as tolerated    CONSULTATIONS  Cardiology  Nephrology    PROCEDURES  Dialysis catheter placement    LABORATORY  Lab Results   Component Value Date    SODIUM 135 06/15/2021    POTASSIUM 4.4 06/15/2021    CHLORIDE 98 06/15/2021    CO2 22 06/15/2021    GLUCOSE 87 06/15/2021    BUN 28 (H) 06/15/2021    CREATININE 4.79 (H) 06/15/2021        Lab Results   Component Value Date    WBC 9.7 06/15/2021    HEMOGLOBIN 11.0 (L) 06/15/2021    HEMATOCRIT 34.5 (L) 06/15/2021    PLATELETCT 127 (L) 06/15/2021        Total time of the discharge process exceeds 50 minutes.

## 2021-06-17 NOTE — CARE PLAN
"The patient is Watcher - Medium risk of patient condition declining or worsening    Shift Goals  Clinical Goals: Pain level not above 4, drink two cups of liquid nutrition, eat 1/4 of lunch.  Patient Goals: \"not die\"  Family Goals: na    Progress made toward(s) clinical / shift goals:    Problem: Knowledge Deficit - Standard  Goal: Patient and family/care givers will demonstrate understanding of plan of care, disease process/condition, diagnostic tests and medications  Outcome: Progressing     Problem: Fluid Volume  Goal: Fluid volume balance will be maintained  Outcome: Progressing     Problem: Respiratory  Goal: Patient will achieve/maintain optimum respiratory ventilation and gas exchange  Outcome: Progressing     Problem: Pain - Standard  Goal: Alleviation of pain or a reduction in pain to the patient’s comfort goal  Outcome: Progressing     Problem: Nutrition  Goal: Patient's nutritional and fluid intake will be adequate or improve  Outcome: Progressing     Problem: Bowel Elimination  Goal: Establish and maintain regular bowel function  Outcome: Progressing       Patient is not progressing towards the following goals:      "

## 2021-06-17 NOTE — PROGRESS NOTES
Inpatient Anticoagulation Service Note    Date: 2021  Reason for Anticoagulation: Other (Comments) (arterial thrombosis)   [id number] VASc Score: 2  HAS-BLED Score: 4    Hemoglobin Value: (!) 11  Hematocrit Value: (!) 34.5  Lab Platelet Value: (!) 127  Target INR: 2.0 to 3.0    INR from last 7 days     Date/Time INR Value    21 0132  (!) 2.03    21 0156  (!) 2.83    06/15/21 02:48:01  (!) 3.48    21 02:41:01  (!) 2.53    21 09:25:01  (!) 2.84    21 00:59:01  (!) 5.31    21 0634  (!) 2.6        Dose from last 7 days     Date/Time Dose (mg)    21 0844  0.5    21 1122  0.5    06/15/21 1306  0    21 1333  1    21 0800  0.5    21 0900  0    21 1702  --    21 1621  1    06/10/21 1249  0        Average Dose (mg): 0.5  Significant Interactions: Antiplatelet Medications, Aspirin, Corticosteroids, Thyroid Medications  Bridge Therapy: No (If less than 5 days and overlap therapy discontinued -- document reason (i.e. Bleed Risk))  INR Value Greater than 2 Prior to Discontinuation of Parenteral Anticoagulation: Not Applicable (If still on overlap therapy, if No -- document reason (i.e. Bleed Risk))    Reversal Agent Administered: Not Applicable  Comments: Patient continuing warfarin for arterial thrombosis. INR is therapeutic. H/H is stable with no signs of bleeding. DDI as noted above. Previous doses have been received or held as appropriate. Diet is moderately poor. INR goal is 2-3.    Plan:  Warfarin 0.5 mg tonight. Check INR in AM ().  Education Material Provided?: No (home medication)  Pharmacist suggested discharge dosin-1 mg PO daily. Follow up with anticoagulation clinic post discharge.     Nell Burger, Pharmacy Intern

## 2021-06-17 NOTE — PROGRESS NOTES
[facility] -  PROGRESS NOTE               Author: Arsalan Lawson M.D.   Date & Time: 6/17/2021  10:00 AM     HPI:  65 yo M PMH ESRD CCPD, HTN, anemia of CKD, CKD-MBD, HLD, and CAD who presents to ED with CC as above.  He has chronic intermittent N/V that he's been battling for a long time.  It leads to wide fluctuations in his po intake.  He also has severe CAD and non-cardiac atherosclerosis.  Recently he feel she has lost 45 lbs in the past month due to the N/V.  He's had a CTA in past that has revealed SMA steosis, but report was not available here at this institution.  Repeat imaging ordered by admit MD and he was admitted for further evaluation.  Has no abd pain and otherwise denies any associated F/C/CP/SOB.  No melena, hematochezia, hematemesis.  No HA, visual changes.    DAILY NEPHROLOGY SUMMARY:  6/05 - Consult done  6/06 - NAEO, +N/V; Unable to keep majority of food down  6/07 - sitting up in bed, still having poor intake vomited this am, PD UF: 461ml, had UGI: WNL, had large BM this am   6/08: sitting up in bed, feels miserable, weak and nauseated, for EGD today, discussed case with Dr. Rivas, dont believe PD is contributing factor as pt BUN is only mildly elevated and pt has been on PD for > 1 year, if GI workup neg could consider transitioning to HD to see if helps elevate symptoms, tolerated PD with UF: 450ml  6/09: Ambulating in room. C/o continued N/V and dehydration. S/p EGD w/bx's of stomach/duodenum.  318 mL net UF. GES today.  6/10: Nauseous with small amount of emesis this am. Does not feel he can tolerate food with GES this am. Primary RN at . 473 Net UF CCPD.   6/11: Unable to complete GES due to nausea. Continues to have nausea.   6/12; again unable to complete GES due to nausea; had a permacath placed but hemodialysis plan was held as became hypotensive and rapid response was called, received IV fluid boluses for resuscitation, cardiology was consulted, seen on  "hemodialysis this a.m., blood pressure better, is very weak  6/13: Had first hemodialysis treatment yesterday, tolerated well, nausea slightly better still feels very weak and has poor appetite  6/14: No new overnight renal events. HD tolerated well with net UF of 1.0 L  6/15: No new overnight renal events. S/p HD yesterday and tolerated well.  6/16: No new overnight renal events. HD today.  6/17: No new overnight renal events. Feels ok except from some dizziness. S/p HD yesterday with 1 L UF    REVIEW OF SYSTEMS:    10 point ROS reviewed and is as per HPI/daily summary or otherwise negative    PMH/PSH/SH/FH: Reviewed and unchanged since admission note  CURRENT MEDICATIONS: Reviewed from admission to present day    VS:  /60   Pulse 76   Temp 36 °C (96.8 °F) (Temporal)   Resp 16   Ht 1.778 m (5' 10\")   Wt 51.9 kg (114 lb 6.7 oz)   SpO2 98%   BMI 16.42 kg/m²   Physical Exam  Nursing note reviewed.   Constitutional:       Appearance: Normal appearance.      Comments: Cachectic    HENT:      Head: Normocephalic and atraumatic.   Neck:      Trachea: No tracheal tenderness.   Cardiovascular:      Rate and Rhythm: Normal rate.      Heart sounds: Murmur heard.   No friction rub. No gallop.       Comments: No edema  Pulmonary:      Effort: Pulmonary effort is normal. No respiratory distress.   Abdominal:      General: There is no distension.      Comments: PD Cath in place   Musculoskeletal:         General: No deformity.      Comments: L BKA w/prosthesis   Skin:     General: Skin is warm and dry.      Coloration: Skin is not jaundiced.      Findings: No rash.   Neurological:      Mental Status: He is alert and oriented to person, place, and time.      Motor: No atrophy.   Psychiatric:         Behavior: Behavior normal.     Fluids:  In: 500 [Dialysis:500]  Out: 1500     LABS:  Recent Labs     06/15/21  0248   SODIUM 135   POTASSIUM 4.4   CHLORIDE 98   CO2 22   GLUCOSE 87   BUN 28*   CREATININE 4.79*   CALCIUM " 8.4*       IMPRESSION:  # ESRD   - Etiology likely 2/2 HTN   -was on CCPD now switched to HD this admission  #Cachexia, unexplained 40 pound weight loss, intractable nausea vomiting - Followed by GI and has had extensive work up, upper GI: WNL , EGD 6/8    -Bx stomach and duodenum   -EGD essentially normal; no evidence of stricture, no erosive gastritis   -Unable to complete gastric emptying study x3  # HTN now hypotensive   -hold blood pressure medicines   #cardiomyopathy, chronic systolic heart failure, severe CAD, severe MR   -EF of 30% and severe MR.     -Needs cath pe cardiology once INR is under 1.5.  # Anemia of CKD   - Goal Hgb 10-11   - Stable  # CKD-MBD   - managed at outpt clinic    - hold phos binder for now with N/V  # Hypokalemia, improved    -Was on PD so hypokalemia expected   # Hyponatremia, mild   -Better, monitoring  #Coagulopathy on warfarin     PLAN:  -No plans for HD today  -Switched from CCPD to hemodialysis to address uremia and see if this improves his GI symptoms -severe nausea and vomiting  -S/P tunneled cath placement 6/11/21; Will be MWF.  -No more CCPD from here on  -No need for p.o. KCl since not on PD anymore   -no dietary protein restrictions; Regular diet ok  -Dose all meds per ESRD  -No fluid restrictions at this time  -Nepro with meals  -hold phos binders since phosphorus very low  -Okay to bring Liquacel Protein supplement from home       thank you and will follow with you

## 2021-06-18 ENCOUNTER — ANTICOAGULATION MONITORING (OUTPATIENT)
Dept: VASCULAR LAB | Facility: MEDICAL CENTER | Age: 66
End: 2021-06-18

## 2021-06-18 DIAGNOSIS — I74.9 ARTERIAL THROMBOSIS (HCC): ICD-10-CM

## 2021-06-18 NOTE — PROGRESS NOTES
Spoke with patient after being discharged about confusion in regards to dialysis. This RN notified patient about being an active member at [facility] and that he needs to go to dialysis 6/18. Patient told this RN that he was camping tomorrow. This RN told patient to not go camping tomorrow and he needed dialysis. Patient agreed and will call first thing in the morning to set up appointment.

## 2021-06-18 NOTE — PROGRESS NOTES
Anticoagulation Summary  As of 2021    INR goal:  2.0-3.0   TTR:  --   INR used for dosin.03 (2021)   Warfarin maintenance plan:  No maintenance plan   Next INR check:     Target end date:      Indications    Arterial thrombosis (HCC) [I74.9]             Anticoagulation Episode Summary     INR check location:  Outside Lab    Preferred lab:      Send INR reminders to:      Comments:        Anticoagulation Care Providers     Provider Role Specialty Phone number    [facility] Responsible  [phone]        Anticoagulation Patient Findings          New patient  Left vm message to determine how much warfarin patient has been taking

## 2021-06-21 NOTE — PROGRESS NOTES
Received referral for warfarin therapy, Spoke with pts caregiver, he has been on warfarin for arterial thrombosis. His warfarin has been managed by his cardiologist in [facility] and will continue with management through them. They have HH and he will get INR drawn today.     Candis Mei, PharmD

## 2021-11-09 NOTE — ANESTHESIA POSTPROCEDURE EVALUATION
Patient: Francis Purvis    Procedure Summary     Date: 06/08/21 Room / Location: [facility] ROOM 26 / SURGERY SAME DAY [facility]    Anesthesia Start: 1215 Anesthesia Stop: 1236    Procedures:       GASTROSCOPY-WITH POSS BIOPSY. (N/A Esophagus)      GASTROSCOPY, WITH BIOPSY (N/A Esophagus)      GASTROSCOPY, WITH POLYPECTOMY (N/A Esophagus) Diagnosis: (Hematemesis, nausea vomiting, gastric polyp)    Surgeons: Antonio Calixto M.D. Responsible Provider: Pola Alba M.D.    Anesthesia Type: general, MAC ASA Status: 4          Final Anesthesia Type: general, MAC  Last vitals  BP   Blood Pressure : 135/72    Temp   36.2 °C (97.2 °F)    Pulse   (!) 102   Resp   16    SpO2   96 %      Anesthesia Post Evaluation    Patient location during evaluation: PACU  Patient participation: complete - patient participated  Level of consciousness: awake and alert  Pain score: 2    Airway patency: patent  Anesthetic complications: no  Cardiovascular status: hemodynamically stable  Respiratory status: acceptable  Hydration status: euvolemic    PONV: none          There were no known complications for this encounter.     Nurse Pain Score: 5 (NPRS)        
(0) Alert; keenly responsive

## 2022-12-15 NOTE — PROGRESS NOTES
Patient seen and examined before proceeding with esophagogastroduodenoscopy with biopsies and/or other endotherapy under anesthesia. Risks, benefits, and alternatives of aforementioned procedures were again discussed with patient and/or family member(s) in detail before proceeding.  Patient was given opportunities to ask questions and discuss other options.  Risks including but not limited to perforation, infection, bleeding, missed lesion(s), possible need for surgery(ies) and/or interventional radiology, possible need for repeat procedure(s) and/or additional testing, hospitalization possibly prolonged, cardiac and/or pulmonary event, aspiration, hypoxia, stroke, medication (s) and/or anesthesia reaction(s), indefinite diagnosis, discomfort/pain, unsuccessful and/or incomplete procedure, ineffective therapy, persistent symptoms, damage to adjacent organs/structure and/or vascular, and other adverse event(s) possibly life-threatening.  Interactive discussion was undertaken with Layman's terms.  I answered questions in full and to satisfaction.  I gave opportunity to cancel, delay and/or reschedule if not completely comfortable with proceeding.  Patient stated understanding and acceptance of these risks, and wished to proceed.   Informed consent was given in clear state of mind and paper permit was confirmed to have been signed before proceeding.     Universal Safety Interventions

## 2023-10-05 NOTE — PROGRESS NOTES
Received bedside report from ROXIE funk, pt care assumed, VS stable, pt assessment complete. Pt AAOx4, c/o chronic pain at this time. No signs of acute distress noted at this time. POC discussed with pt and verbalizes no questions. Pt denies any additional needs at this time. Bed in lowest position, bed alarm off. Pt educated on fall risk and verbalized understanding, call light within reach, hourly rounding initiated. in a sinus rhythm.  No chest pain at this time.   98.8 patient

## 2025-01-07 NOTE — CARE PLAN
The patient is Stable - Low risk of patient condition declining or worsening    Shift Goals  Clinical Goals: nutrition; peritoneal dialysis  Patient Goals: rest    Problem: Respiratory  Goal: Patient will achieve/maintain optimum respiratory ventilation and gas exchange  Outcome: Progressing     Problem: Fall Risk  Goal: Patient will remain free from falls  Outcome: Progressing

## (undated) DEVICE — SNARECAPTIVATOR 13MM SMALL HEXAGONAL SNARE (10/BX)

## (undated) DEVICE — BLOCK BITE ENDOSCOPIC 2809 - (100/BX) INTERMEDIATE

## (undated) DEVICE — FILM CASSETTE ENDO

## (undated) DEVICE — CONTAINER, SPECIMEN, STERILE

## (undated) DEVICE — SET EXTENSION WITH 2 PORTS (48EA/CA) ***PART #2C8610 IS A SUBSTITUTE*****

## (undated) DEVICE — TUBE CONNECTING SUCTION - CLEAR PLASTIC STERILE 72 IN (50EA/CA)

## (undated) DEVICE — CANISTER SUCTION RIGID RED 1500CC (40EA/CA)

## (undated) DEVICE — TUBE SUCTION YANKAUER  1/4 X 6FT (20EA/CA)"

## (undated) DEVICE — TOWEL STOP TIMEOUT SAFETY FLAG (40EA/CA)

## (undated) DEVICE — ELECTRODE 850 FOAM ADHESIVE - HYDROGEL RADIOTRNSPRNT (50/PK)

## (undated) DEVICE — TRAP POLYP E-TRAP (25EA/BX)

## (undated) DEVICE — TUBING CLEARLINK DUO-VENT - C-FLO (48EA/CA)

## (undated) DEVICE — FORCEP RADIAL JAW 4 STANDARD CAPACITY W/NEEDLE 240CM (40EA/BX)